# Patient Record
Sex: MALE | HISPANIC OR LATINO | Employment: STUDENT | URBAN - METROPOLITAN AREA
[De-identification: names, ages, dates, MRNs, and addresses within clinical notes are randomized per-mention and may not be internally consistent; named-entity substitution may affect disease eponyms.]

---

## 2017-09-02 ENCOUNTER — HOSPITAL ENCOUNTER (EMERGENCY)
Facility: CLINIC | Age: 32
Discharge: HOME | End: 2017-09-02
Attending: EMERGENCY MEDICINE | Admitting: EMERGENCY MEDICINE
Payer: COMMERCIAL

## 2017-09-02 VITALS
SYSTOLIC BLOOD PRESSURE: 110 MMHG | TEMPERATURE: 98.1 F | BODY MASS INDEX: 28.32 KG/M2 | RESPIRATION RATE: 16 BRPM | HEIGHT: 65 IN | WEIGHT: 170 LBS | HEART RATE: 67 BPM | DIASTOLIC BLOOD PRESSURE: 70 MMHG | OXYGEN SATURATION: 99 %

## 2017-09-02 DIAGNOSIS — R42 DIZZINESS: ICD-10-CM

## 2017-09-02 DIAGNOSIS — E86.0 DEHYDRATION: ICD-10-CM

## 2017-09-02 LAB
ANION GAP SERPL CALCULATED.3IONS-SCNC: 8 MMOL/L (ref 3–14)
BASOPHILS # BLD AUTO: 0.1 10E9/L (ref 0–0.2)
BASOPHILS NFR BLD AUTO: 0.7 %
BUN SERPL-MCNC: 19 MG/DL (ref 7–30)
CALCIUM SERPL-MCNC: 9.2 MG/DL (ref 8.5–10.1)
CHLORIDE SERPL-SCNC: 110 MMOL/L (ref 94–109)
CO2 SERPL-SCNC: 23 MMOL/L (ref 20–32)
CREAT SERPL-MCNC: 0.69 MG/DL (ref 0.66–1.25)
DIFFERENTIAL METHOD BLD: NORMAL
EOSINOPHIL # BLD AUTO: 0.2 10E9/L (ref 0–0.7)
EOSINOPHIL NFR BLD AUTO: 2.7 %
ERYTHROCYTE [DISTWIDTH] IN BLOOD BY AUTOMATED COUNT: 13 % (ref 10–15)
GFR SERPL CREATININE-BSD FRML MDRD: >90 ML/MIN/1.7M2
GLUCOSE SERPL-MCNC: 80 MG/DL (ref 70–99)
HCT VFR BLD AUTO: 47.6 % (ref 40–53)
HGB BLD-MCNC: 15 G/DL (ref 13.3–17.7)
IMM GRANULOCYTES # BLD: 0 10E9/L (ref 0–0.4)
IMM GRANULOCYTES NFR BLD: 0.1 %
LYMPHOCYTES # BLD AUTO: 2.6 10E9/L (ref 0.8–5.3)
LYMPHOCYTES NFR BLD AUTO: 33.8 %
MCH RBC QN AUTO: 28.1 PG (ref 26.5–33)
MCHC RBC AUTO-ENTMCNC: 31.5 G/DL (ref 31.5–36.5)
MCV RBC AUTO: 89 FL (ref 78–100)
MONOCYTES # BLD AUTO: 0.6 10E9/L (ref 0–1.3)
MONOCYTES NFR BLD AUTO: 7.8 %
NEUTROPHILS # BLD AUTO: 4.2 10E9/L (ref 1.6–8.3)
NEUTROPHILS NFR BLD AUTO: 54.9 %
NRBC # BLD AUTO: 0 10*3/UL
NRBC BLD AUTO-RTO: 0 /100
PLATELET # BLD AUTO: 224 10E9/L (ref 150–450)
POTASSIUM SERPL-SCNC: 4.2 MMOL/L (ref 3.4–5.3)
RBC # BLD AUTO: 5.33 10E12/L (ref 4.4–5.9)
SODIUM SERPL-SCNC: 140 MMOL/L (ref 133–144)
WBC # BLD AUTO: 7.7 10E9/L (ref 4–11)

## 2017-09-02 PROCEDURE — 93010 ELECTROCARDIOGRAM REPORT: CPT | Mod: Z6 | Performed by: EMERGENCY MEDICINE

## 2017-09-02 PROCEDURE — 93005 ELECTROCARDIOGRAM TRACING: CPT

## 2017-09-02 PROCEDURE — 96360 HYDRATION IV INFUSION INIT: CPT

## 2017-09-02 PROCEDURE — 80048 BASIC METABOLIC PNL TOTAL CA: CPT | Performed by: EMERGENCY MEDICINE

## 2017-09-02 PROCEDURE — 99284 EMERGENCY DEPT VISIT MOD MDM: CPT | Mod: 25 | Performed by: EMERGENCY MEDICINE

## 2017-09-02 PROCEDURE — 85025 COMPLETE CBC W/AUTO DIFF WBC: CPT | Performed by: EMERGENCY MEDICINE

## 2017-09-02 PROCEDURE — 99284 EMERGENCY DEPT VISIT MOD MDM: CPT | Mod: 25

## 2017-09-02 PROCEDURE — 25000131 ZZH RX MED GY IP 250 OP 636 PS 637: Performed by: EMERGENCY MEDICINE

## 2017-09-02 PROCEDURE — 25000128 H RX IP 250 OP 636: Performed by: EMERGENCY MEDICINE

## 2017-09-02 RX ORDER — METOCLOPRAMIDE 10 MG/1
10 TABLET ORAL ONCE
Status: COMPLETED | OUTPATIENT
Start: 2017-09-02 | End: 2017-09-02

## 2017-09-02 RX ORDER — MECLIZINE HYDROCHLORIDE 25 MG/1
25 TABLET ORAL ONCE
Status: COMPLETED | OUTPATIENT
Start: 2017-09-02 | End: 2017-09-02

## 2017-09-02 RX ADMIN — SODIUM CHLORIDE 1000 ML: 9 INJECTION, SOLUTION INTRAVENOUS at 20:42

## 2017-09-02 RX ADMIN — MECLIZINE 25 MG: 25 TABLET ORAL at 20:14

## 2017-09-02 RX ADMIN — METOCLOPRAMIDE HYDROCHLORIDE 10 MG: 10 TABLET ORAL at 20:14

## 2017-09-02 NOTE — ED AVS SNAPSHOT
[facility], Emergency Department    [address] [facility] [address]    Phone:  [phone]                                       Kirby Patel   MRN: [id number]    Department:  [facility], Emergency Department   Date of Visit:  9/2/2017           Patient Information     Date Of Birth          1985        Your diagnoses for this visit were:     Dizziness     Dehydration        You were seen by Jett Wesley MD.        Discharge Instructions         Dehydration (Adult)  Dehydration occurs when your body loses too much fluid. This may be the result of prolonged vomiting or diarrhea, excessive sweating, or a high fever. It may also happen if you don t drink enough fluid when you re sick or out in the heat. Misuse of diuretics (water pills) can also be a cause.  Symptoms include thirst and decreased urine output. You may also feel dizzy, weak, fatigued, or very drowsy. The diet described below is usually enough to treat dehydration. In some cases, you may need medicine.  Home care    Drink at least 12 8-ounce glasses of fluid every day to resolve the dehydration. Fluid may include water; orange juice; lemonade; apple, grape, or cranberry juice; clear fruit drinks; electrolyte replacement and sports drinks; and teas and coffee without caffeine. If you have been diagnosed with a kidney disease, ask your doctor how much and what types of fluids you should drink to prevent dehydration. If you have kidney disease, fluid can build up in the body. This can be dangerous to your health.    If you have a fever, muscle aches, or a headache as a result of a cold or flu, you may take acetaminophen or ibuprofen, unless another medicine was prescribed. If you have chronic liver or kidney disease, or have ever had a stomach ulcer or gastrointestinal bleeding, talk with your health care provider before using these medicines. Don't take aspirin if you are younger than 18 and have a fever. Aspirin raises  the chance for severe liver injury.  Follow-up care  Follow up with your health care provider, or as advised.  When to seek medical advice  Call your health care provider right away if any of these occur:    Continued vomiting    Frequent diarrhea (more than 5 times a day); blood (red or black color) or mucus in diarrhea    Blood in vomit or stool    Swollen abdomen or increasing abdominal pain    Weakness, dizziness, or fainting    Unusual drowsiness or confusion    Reduced urine output or extreme thirst    Fever of 100.4 F (34 C) or higher  Date Last Reviewed: 5/31/2015 2000-2017 [employer]. [address] [place] [address]. All rights reserved. This information is not intended as a substitute for professional medical care. Always follow your healthcare professional's instructions.          24 Hour Appointment Hotline       To make an appointment at any [facility], call [phone] ([phone]). If you don't have a family doctor or clinic, we will help you find one. [facility] clinics are conveniently located to serve the needs of you and your family.             Review of your medicines      Our records show that you are taking the medicines listed below. If these are incorrect, please call your family doctor or clinic.        Dose / Directions Last dose taken    diphenhydrAMINE 25 MG tablet   Commonly known as:  BENADRYL   Dose:  25-50 mg   Quantity:  60 tablet        Take 1-2 tablets (25-50 mg) by mouth every 6 hours as needed for itching or allergies (hives)   Refills:  1        LEXAPRO PO   Dose:  2.5 mg        Take 2.5 mg by mouth daily   Refills:  0                Procedures and tests performed during your visit     Basic metabolic panel    CBC with platelets differential    EKG 12 lead      Orders Needing Specimen Collection     None      Pending Results     Date and Time Order Name Status Description    9/2/2017 1929 EKG 12 lead Preliminary             Pending Culture  "Results     No orders found from 2017 to 9/3/2017.            Pending Results Instructions     If you had any lab results that were not finalized at the time of your Discharge, you can call the ED Lab Result RN at [phone]. You will be contacted by this team for any positive Lab results or changes in treatment. The nurses are available 7 days a week from 10A to 6:30P.  You can leave a message 24 hours per day and they will return your call.        Thank you for choosing [facility]       Thank you for choosing [facility] for your care. Our goal is always to provide you with excellent care. Hearing back from our patients is one way we can continue to improve our services. Please take a few minutes to complete the written survey that you may receive in the mail after you visit with us. Thank you!        [employer] Information     [facility] lets you send messages to your doctor, view your test results, renew your prescriptions, schedule appointments and more. To sign up, go to [email or web address] . Click on \"Log in\" on the left side of the screen, which will take you to the Welcome page. Then click on \"Sign up Now\" on the right side of the page.     You will be asked to enter the access code listed below, as well as some personal information. Please follow the directions to create your username and password.     Your access code is: [email or web address]  Expires: 2017  9:52 PM     Your access code will  in 90 days. If you need help or a new code, please call your [facility] clinic or [phone].        Care EveryWhere ID     This is your Care EveryWhere ID. This could be used by other organizations to access your [facility] medical records  [id number]        Equal Access to Services     [facility] : [address], vance lee, theresa isaacs. So [facility] [phone].    ATENCIÓN: Si habla español, tiene a niño disposición servicios gratuitos " de asistencia lingüística. Karson olson [phone].    We comply with applicable federal civil rights laws and [place] laws. We do not discriminate on the basis of race, color, national origin, age, disability sex, sexual orientation or gender identity.            After Visit Summary       This is your record. Keep this with you and show to your community pharmacist(s) and doctor(s) at your next visit.

## 2017-09-02 NOTE — ED AVS SNAPSHOT
[facility], [place], Emergency Department    [address] [place] [address]    Phone:  [phone]                                       Kirby Patel   MRN: [id number]    Department:  [facility], Emergency Department   Date of Visit:  9/2/2017           After Visit Summary Signature Page     I have received my discharge instructions, and my questions have been answered. I have discussed any challenges I see with this plan with the nurse or doctor.    ..........................................................................................................................................  Patient/Patient Representative Signature      ..........................................................................................................................................  Patient Representative Print Name and Relationship to Patient    ..................................................               ................................................  Date                                            Time    ..........................................................................................................................................  Reviewed by Signature/Title    ...................................................              ..............................................  Date                                                            Time

## 2017-09-02 NOTE — ED NOTES
Pt reports five days of HA/dizziness that improves w/ various OTC meds but returns each day. At pressent only c/o dizziness, increases w/ head movement.

## 2017-09-03 NOTE — DISCHARGE INSTRUCTIONS
Dehydration (Adult)  Dehydration occurs when your body loses too much fluid. This may be the result of prolonged vomiting or diarrhea, excessive sweating, or a high fever. It may also happen if you don t drink enough fluid when you re sick or out in the heat. Misuse of diuretics (water pills) can also be a cause.  Symptoms include thirst and decreased urine output. You may also feel dizzy, weak, fatigued, or very drowsy. The diet described below is usually enough to treat dehydration. In some cases, you may need medicine.  Home care    Drink at least 12 8-ounce glasses of fluid every day to resolve the dehydration. Fluid may include water; orange juice; lemonade; apple, grape, or cranberry juice; clear fruit drinks; electrolyte replacement and sports drinks; and teas and coffee without caffeine. If you have been diagnosed with a kidney disease, ask your doctor how much and what types of fluids you should drink to prevent dehydration. If you have kidney disease, fluid can build up in the body. This can be dangerous to your health.    If you have a fever, muscle aches, or a headache as a result of a cold or flu, you may take acetaminophen or ibuprofen, unless another medicine was prescribed. If you have chronic liver or kidney disease, or have ever had a stomach ulcer or gastrointestinal bleeding, talk with your health care provider before using these medicines. Don't take aspirin if you are younger than 18 and have a fever. Aspirin raises the chance for severe liver injury.  Follow-up care  Follow up with your health care provider, or as advised.  When to seek medical advice  Call your health care provider right away if any of these occur:    Continued vomiting    Frequent diarrhea (more than 5 times a day); blood (red or black color) or mucus in diarrhea    Blood in vomit or stool    Swollen abdomen or increasing abdominal pain    Weakness, dizziness, or fainting    Unusual drowsiness or confusion    Reduced urine  output or extreme thirst    Fever of 100.4 F (34 C) or higher  Date Last Reviewed: 5/31/2015 2000-2017 [employer]. [address] [place] [address], [place] [address]. All rights reserved. This information is not intended as a substitute for professional medical care. Always follow your healthcare professional's instructions.

## 2017-09-03 NOTE — ED PROVIDER NOTES
"  History     Chief Complaint   Patient presents with     Dizziness     Pt reports five days of HA/dizziness that improves w/ various OTC meds but returns each day. At pressent only c/o dizziness.     HPI  Kirby Patel is a 32 year old male with a history of anxiety who presents for evaluation of dizziness. Patient complains of constant dizziness with associated headache described as \"head pressure\" as well as bilateral ear pain and sinus pressure that has been ongoing for the past five days. He describes the dizziness as feeling as though the floor is unsteady/moving and generally feels disoriented to the point where he was having difficulty driving today, so took a Lyft here to the Emergency Department. His dizziness worsens with looking to the left or right. He notes he has had cold-type symptoms for the past two weeks with \"swollen lymph nodes\" and did have a migraine-type headache for the first two days he also had dizziness, but this transitioned into a diffuse headache. He denies fever. He did drink one beer last night, but otherwise denies drug or substance abuse. He did schedule an appointment on Thursday, five days from now, to see a specialist regarding his bilateral ear pain. No history of heart disease.     I have reviewed the Medications, Allergies, Past Medical and Surgical History, and Social History in the [employer] system.  Past Medical History:   Diagnosis Date     Anxiety        History reviewed. No pertinent surgical history.    No family history on file.    Social History   Substance Use Topics     Smoking status: Never Smoker     Smokeless tobacco: Not on file     Alcohol use No       No current facility-administered medications for this encounter.      Current Outpatient Prescriptions   Medication     Escitalopram Oxalate (LEXAPRO PO)     diphenhydrAMINE (BENADRYL) 25 MG tablet        Allergies   Allergen Reactions     Codeine Anaphylaxis     Hydromorphone Nausea and Vomiting     Zofran DOES " "NOT HELP, only compounds problem, PER PT.        Review of Systems  ROS: 14 point ROS neg other than the symptoms noted above in the HPI.    Physical Exam   BP: 110/78  Pulse: 67  Temp: 98.1  F (36.7  C)  Resp: 16  Height: 165.1 cm (5' 5\")  Weight: 77.1 kg (170 lb)  SpO2: 99 %  Physical Exam   Constitutional: He is oriented to person, place, and time. He appears well-developed and well-nourished. No distress.   HENT:   Head: Normocephalic and atraumatic.   Eyes: EOM are normal. Pupils are equal, round, and reactive to light. No scleral icterus.   Neck: Normal range of motion. Neck supple.   Cardiovascular: Normal rate and normal heart sounds.    Pulmonary/Chest: Effort normal. No respiratory distress.   Musculoskeletal: Normal range of motion.   Neurological: He is alert and oriented to person, place, and time. No cranial nerve deficit. Coordination normal.   Skin: Skin is warm and dry. No rash noted. He is not diaphoretic. No erythema. No pallor.       ED Course     ED Course     Procedures       7:26 PM  The patient was seen and examined by Dr. Wesley in Room 8.          EKG Interpretation:      Interpreted by Jett Wesley  Time reviewed: 1932  Symptoms at time of EKG: dizziness   Rhythm: normal sinus   Rate: normal  Axis: normal  Ectopy: none  Conduction: normal  ST Segments/ T Waves: No ST-T wave changes  Q Waves: none  Comparison to prior: No old EKG available    Clinical Impression: normal EKG            Critical Care time:  none           Labs Ordered and Resulted from Time of ED Arrival Up to the Time of Departure from the ED   BASIC METABOLIC PANEL - Abnormal; Notable for the following:        Result Value    Chloride 110 (*)     All other components within normal limits   CBC WITH PLATELETS DIFFERENTIAL            Assessments & Plan (with Medical Decision Making)   This is a 32-year-old male coming into the emergency room with complaints of 5 days of mild headache and dizziness.  Denies any previous " history of this in the past.  On physical exam is otherwise grossly unremarkable.  He can ambulate without issue.  He is able to sit up from bed without significant distress.  VITAL signs are within normal limits.  EKG shows normal sinus rhythm without signs of ectopy or ischemia.  All labs reviewed which were grossly unremarkable.  Patient was fired with a liter of normal saline as well as some Reglan and meclizine here in the emergency room.  On reassessment he is significantly improved.  He does have an appointment with ENT which I recommend that he continue following up.  Outside of that his physical exam is grossly unremarkable I believe he can be safely discharged.    I have reviewed the nursing notes.    I have reviewed the findings, diagnosis, plan and need for follow up with the patient.    Discharge Medication List as of 9/2/2017  9:52 PM          Final diagnoses:   Dizziness   Dehydration   Mercedes JUAREZ, am serving as a trained medical scribe to document services personally performed by Jett Wesley MD, based on the provider's statements to me.   Jett JUAREZ MD, was physically present and have reviewed and verified the accuracy of this note documented by Mercedes Vu.      9/2/2017   [facility], [place], EMERGENCY DEPARTMENT     Jett Wesley MD  09/02/17 4904

## 2017-09-04 LAB — INTERPRETATION ECG - MUSE: NORMAL

## 2018-04-28 ENCOUNTER — HOSPITAL ENCOUNTER (EMERGENCY)
Facility: CLINIC | Age: 33
Discharge: HOME | End: 2018-04-28
Attending: EMERGENCY MEDICINE | Admitting: EMERGENCY MEDICINE
Payer: COMMERCIAL

## 2018-04-28 ENCOUNTER — APPOINTMENT (OUTPATIENT)
Dept: GENERAL RADIOLOGY | Facility: CLINIC | Age: 33
End: 2018-04-28
Attending: EMERGENCY MEDICINE
Payer: COMMERCIAL

## 2018-04-28 VITALS
TEMPERATURE: 97.5 F | DIASTOLIC BLOOD PRESSURE: 83 MMHG | HEIGHT: 64 IN | OXYGEN SATURATION: 99 % | RESPIRATION RATE: 20 BRPM | SYSTOLIC BLOOD PRESSURE: 119 MMHG | HEART RATE: 69 BPM

## 2018-04-28 DIAGNOSIS — S60.011A CONTUSION OF RIGHT THUMB WITHOUT DAMAGE TO NAIL, INITIAL ENCOUNTER: ICD-10-CM

## 2018-04-28 DIAGNOSIS — S60.10XA SUBUNGUAL HEMATOMA OF FINGERNAIL, INITIAL ENCOUNTER: ICD-10-CM

## 2018-04-28 PROCEDURE — 99283 EMERGENCY DEPT VISIT LOW MDM: CPT | Performed by: EMERGENCY MEDICINE

## 2018-04-28 PROCEDURE — 25000132 ZZH RX MED GY IP 250 OP 250 PS 637: Performed by: EMERGENCY MEDICINE

## 2018-04-28 PROCEDURE — 73140 X-RAY EXAM OF FINGER(S): CPT | Mod: RT

## 2018-04-28 PROCEDURE — 99283 EMERGENCY DEPT VISIT LOW MDM: CPT | Mod: Z6 | Performed by: EMERGENCY MEDICINE

## 2018-04-28 RX ORDER — ACETAMINOPHEN 500 MG
1000 TABLET ORAL ONCE
Status: COMPLETED | OUTPATIENT
Start: 2018-04-28 | End: 2018-04-28

## 2018-04-28 RX ORDER — IBUPROFEN 800 MG/1
800 TABLET, FILM COATED ORAL ONCE
Status: COMPLETED | OUTPATIENT
Start: 2018-04-28 | End: 2018-04-28

## 2018-04-28 RX ADMIN — ACETAMINOPHEN 1000 MG: 500 TABLET, FILM COATED ORAL at 06:55

## 2018-04-28 RX ADMIN — IBUPROFEN 800 MG: 800 TABLET ORAL at 06:55

## 2018-04-28 NOTE — ED TRIAGE NOTES
"Pt arrives with complaints of right thumb pain/swelling after smashing his thumb in a door at 0000. Pain has been increasing and pt notes decreased ROM, noting that it feels \"really stiff\". CMS intact. Pt states he took six regular strength advil since 0000.  "

## 2018-04-28 NOTE — ED AVS SNAPSHOT
[facility], Emergency Department    [address] [address]    Phone:  [phone]                                       Kirby Patel   MRN: [id number]    Department:  [facility], Emergency Department   Date of Visit:  4/28/2018           Patient Information     Date Of Birth          1985        Your diagnoses for this visit were:     Contusion of right thumb without damage to nail, initial encounter     Subungual hematoma of fingernail, initial encounter        You were seen by Tyrsee Freire MD.        Discharge Instructions       Please make an appointment to follow up with Your Primary Care Provider in 7 days even if entirely better.  You can call to discuss the appropriate follow up timing with your doctor.     Return to the emergency department for any worsening redness, swelling, drainage, pain, fever or any other concerns as given or discussed.     You can take tylenol as needed for pain. The maximum daily dose is 4000 mg and the maximum single dose at a time is 1000mg. For your condition, your should take 1000mg every 6 hours as needed for pain.    You can take ibuprofen for pain. The maximum daily dose is 2400 mg and the maximum single dose as a time is 800mg. For your condition, your should take 600mg every 4 hours as needed for pain.        24 Hour Appointment Hotline       To make an appointment at any [facility] clinic, call [phone] ([phone]). If you don't have a family doctor or clinic, we will help you find one. [facility] clinics are conveniently located to serve the needs of you and your family.             Review of your medicines      Our records show that you are taking the medicines listed below. If these are incorrect, please call your family doctor or clinic.        Dose / Directions Last dose taken    diphenhydrAMINE 25 MG tablet   Commonly known as:  BENADRYL   Dose:  25-50 mg   Quantity:  60 tablet        Take 1-2 tablets (25-50 mg) by  "mouth every 6 hours as needed for itching or allergies (hives)   Refills:  1        LEXAPRO PO   Dose:  2.5 mg        Take 2.5 mg by mouth daily   Refills:  0                Procedures and tests performed during your visit     XR Finger Right G/E 2 Views      Orders Needing Specimen Collection     None      Pending Results     Date and Time Order Name Status Description    2018 0554 XR Finger Right G/E 2 Views In process             Pending Culture Results     No orders found from 2018 to 2018.            Pending Results Instructions     If you had any lab results that were not finalized at the time of your Discharge, you can call the ED Lab Result RN at [phone]. You will be contacted by this team for any positive Lab results or changes in treatment. The nurses are available 7 days a week from 10A to 6:30P.  You can leave a message 24 hours per day and they will return your call.        Thank you for choosing [facility]       Thank you for choosing [facility] for your care. Our goal is always to provide you with excellent care. Hearing back from our patients is one way we can continue to improve our services. Please take a few minutes to complete the written survey that you may receive in the mail after you visit with us. Thank you!        [employer] Information     [facility] lets you send messages to your doctor, view your test results, renew your prescriptions, schedule appointments and more. To sign up, go to [email or web address] . Click on \"Log in\" on the left side of the screen, which will take you to the Welcome page. Then click on \"Sign up Now\" on the right side of the page.     You will be asked to enter the access code listed below, as well as some personal information. Please follow the directions to create your username and password.     Your access code is: [email or web address]  Expires: 2018  6:47 AM     Your access code will  in 90 days. If you need help or a new code, please call your " [facility] or [phone].        Care EveryWhere ID     This is your Care EveryWhere ID. This could be used by other organizations to access your [facility] medical records  [id number]        Equal Access to Services     MOIRA REYNA : [address], wasunnyda luqadaha, qaybta kaalmada judah, theresa heath. So [facility] [phone].    ATENCIÓN: Si habla español, tiene a niño disposición servicios gratuitos de asistencia lingüística. Llame al [phone].    We comply with applicable federal civil rights laws and [place] laws. We do not discriminate on the basis of race, color, national origin, age, disability, sex, sexual orientation, or gender identity.            After Visit Summary       This is your record. Keep this with you and show to your community pharmacist(s) and doctor(s) at your next visit.

## 2018-04-28 NOTE — DISCHARGE INSTRUCTIONS
Please make an appointment to follow up with Your Primary Care Provider in 7 days even if entirely better.  You can call to discuss the appropriate follow up timing with your doctor.     Return to the emergency department for any worsening redness, swelling, drainage, pain, fever or any other concerns as given or discussed.     You can take tylenol as needed for pain. The maximum daily dose is 4000 mg and the maximum single dose at a time is 1000mg. For your condition, your should take 1000mg every 6 hours as needed for pain.    You can take ibuprofen for pain. The maximum daily dose is 2400 mg and the maximum single dose as a time is 800mg. For your condition, your should take 600mg every 4 hours as needed for pain.

## 2018-04-28 NOTE — ED PROVIDER NOTES
"  History     Chief Complaint   Patient presents with     Hand Injury     HPI  Kirby Patel is a 32 year old male presenting with right thumb pain.  Smashes distal thumb phalanx in a car door earlier today since then with increasing pain now radiating into his wrist and shoulder.  No direct trauma to any location except for the distal thumb.  There has been some bruising under the nail, and swelling of the distal thumb but otherwise no evidence of injury.     Completely well prior to incident    I have reviewed the Medications, Allergies, Past Medical and Surgical History, and Social History in the Epic system.    Review of Systems   8 point review of symptoms was performed and is negative except as noted above.     Physical Exam   BP: 119/83  Pulse: 69  Temp: 97.5  F (36.4  C)  Resp: 20  Height: 162.6 cm (5' 4\")  SpO2: 97 %      Physical Exam  GEN: Well appearing, non toxic, cooperative and conversant.   HEENT: The head is normocephalic and atraumatic. Pupils are equal round and reactive to light. Extraocular motions are intact. There is no facial swelling.   CV: Regular rate   PULM: Unlabored breathing     EXT: Full range of motion.  No edema.  Right distal thumb with no gross deformity.  No nail fracture.  There is a subungual hematoma approximately 30% of the nailbed, no evidence of avulsion otherwise.  EHL, FHL, TA 5 out of 5 flexion extension across the PIP joint is completely intact.  No tenderness about the wrist forearm elbow or any other part of the extremity.  NEURO: Cranial nerves II through XII are intact and symmetric. Bilateral upper and lower extremities grossly show full range of motion without any focal deficits.     PSYCH: Calm and cooperative, interactive.     ED Course     ED Course     Procedures        Results for orders placed or performed during the hospital encounter of 04/28/18   XR Finger Right G/E 2 Views    Narrative    Exam: XR FINGER RT G/E 2 VW, 4/28/2018 6:42 AM    Indication: " distal thumb pain after smashed in car door.;     Comparison: None    Findings:   No acute fracture or dislocation.      Impression    Impression: No fracture.            Labs Ordered and Resulted from Time of ED Arrival Up to the Time of Departure from the ED - No data to display         Assessments & Plan (with Medical Decision Making)   Subungual hematoma following injury as described  X-ray without evidence of acute fracture  There is normal anatomic position of the thumb and hand.    Given only small sliver of hematoma, indication for trephination or nail removal for nailbed repairs.  -NSAIDs and Tylenol as needed for pain  -PCP follow-up 1 week  -Careful precautions given and discussed    - Patient agrees to our plan and is ready and eager for discharge. Care plan, follow up plan, and reasons to return immediately to the ED were dicussed in detail and summarized as noted in the discharge instructions.          I have reviewed the nursing notes.    I have reviewed the findings, diagnosis, plan and need for follow up with the patient.    New Prescriptions    No medications on file       Final diagnoses:   Contusion of right thumb without damage to nail, initial encounter   Subungual hematoma of fingernail, initial encounter       4/28/2018   [facility], [place], EMERGENCY DEPARTMENT     Tyrese Freire MD  04/28/18 0700

## 2018-11-27 ENCOUNTER — HOSPITAL ENCOUNTER (EMERGENCY)
Facility: CLINIC | Age: 33
Discharge: HOME | End: 2018-11-28
Attending: EMERGENCY MEDICINE | Admitting: EMERGENCY MEDICINE
Payer: COMMERCIAL

## 2018-11-27 DIAGNOSIS — R11.0 NAUSEA: ICD-10-CM

## 2018-11-27 DIAGNOSIS — R10.13 ABDOMINAL PAIN, EPIGASTRIC: ICD-10-CM

## 2018-11-27 PROCEDURE — 99284 EMERGENCY DEPT VISIT MOD MDM: CPT | Mod: Z6 | Performed by: EMERGENCY MEDICINE

## 2018-11-27 PROCEDURE — 96361 HYDRATE IV INFUSION ADD-ON: CPT | Mod: 59

## 2018-11-27 PROCEDURE — 83690 ASSAY OF LIPASE: CPT | Performed by: EMERGENCY MEDICINE

## 2018-11-27 PROCEDURE — 80053 COMPREHEN METABOLIC PANEL: CPT | Performed by: EMERGENCY MEDICINE

## 2018-11-27 PROCEDURE — 99284 EMERGENCY DEPT VISIT MOD MDM: CPT | Mod: 25

## 2018-11-27 PROCEDURE — 85025 COMPLETE CBC W/AUTO DIFF WBC: CPT | Performed by: EMERGENCY MEDICINE

## 2018-11-27 PROCEDURE — 96374 THER/PROPH/DIAG INJ IV PUSH: CPT

## 2018-11-27 RX ORDER — ONDANSETRON 2 MG/ML
4 INJECTION INTRAMUSCULAR; INTRAVENOUS EVERY 30 MIN PRN
Status: DISCONTINUED | OUTPATIENT
Start: 2018-11-27 | End: 2018-11-28 | Stop reason: HOSPADM

## 2018-11-27 RX ORDER — SODIUM CHLORIDE 9 MG/ML
1000 INJECTION, SOLUTION INTRAVENOUS CONTINUOUS
Status: DISCONTINUED | OUTPATIENT
Start: 2018-11-27 | End: 2018-11-28 | Stop reason: HOSPADM

## 2018-11-27 NOTE — ED AVS SNAPSHOT
[facility], Emergency Department    [address] [address]    Phone:  [phone]                                       Kirby Patel   MRN: [id number]    Department:  [facility], Emergency Department   Date of Visit:  11/27/2018           Patient Information     Date Of Birth          1985        Your diagnoses for this visit were:     Abdominal pain, epigastric     Nausea        You were seen by Leeroy Angela DO.        Discharge Instructions         Follow-up with your primary care provider.  Return emergency department for any new or worsening symptoms as needed.    Abdominal Pain, Unknown Cause [Male]  Based on your visit today, the exact cause of your abdominal (stomach) pain is not clear. Your exam and tests do not indicate a dangerous cause at this time. However, the signs of a serious problem may take more time to appear. Although your exam was reassuring today, sometimes early in the course of many conditions, exam and lab tests can appear normal. Therefore, it is important for you to watch for any new symptoms or worsening of your condition.  Causes:  It may not be obvious what caused your symptoms. Pay attention to things that do seem to make your symptoms worse or better and discuss this with your doctor when you follow up.  Diagnosis:  The evaluation of abdominal pain in the emergency department may only require an exam by the doctor or it may include blood, urine or imaging studies, depending on many factors. Sometimes exams and tests can identify a cause but in many cases, a clear cause is not found. Further testing at follow up visits may help to suggest a clear diagnosis.  Home Care:    Rest as much as possible until your next exam.    Try to avoid any medications (unless otherwise directed by your doctor), foods, activities, or other factors that you may have contributed to your symptoms.    Try to eat foods that you know that you have tolerated well  in the past. Certain diets may be recommended for some conditions that cause abdominal pain. However, since the cause of your symptoms may not be clear, discuss your diet more with your primary care provider or specialist for further recommendations.     Eating several small meals per day as opposed to 2 or 3 larger meals may help.    Monitor closely for anything that may make your symptoms worse or better. Pay close attention to symptoms below that may indicate worsening of your condition.  Follow Up And Precautions:  See your doctor or this facility as instructed (or sooner, if your symptoms are not improving). In some cases, you may need more testing.  Contact Your Doctor Or Seek Medical Attention  if any of the following occur:    Pain is becoming worse    You are unable to take your medications because of too much vomiting    Swelling of the abdomen    Fever of 101 F (38.3 C) or higher, or as directed by your health care provider    Blood in vomit or bowel movements (dark red or black color)    Jaundice (yellow color of eyes and skin)    New onset of weakness, dizziness or fainting    New onset of chest, arm, back, neck or jaw pain    7888-4800 [employer]. [address] [place] [address]. All rights reserved. This information is not intended as a substitute for professional medical care. Always follow your healthcare professional's instructions.  This information has been modified by your health care provider with permission from the publisher.  Modifications clinically reviewed by Dr. Lazaro Domínguez on 7/20/18.          24 Hour Appointment Hotline       To make an appointment at any [facility], call [phone] ([phone]). If you don't have a family doctor or clinic, we will help you find one. [facility] are conveniently located to serve the needs of you and your family.             Review of your medicines      START taking        Dose / Directions Last dose taken     ondansetron 4 MG ODT tab   Commonly known as:  ZOFRAN ODT   Dose:  4 mg   Quantity:  20 tablet        Take 1 tablet (4 mg) by mouth every 8 hours as needed for nausea   Refills:  1          Our records show that you are taking the medicines listed below. If these are incorrect, please call your family doctor or clinic.        Dose / Directions Last dose taken    diphenhydrAMINE 25 MG tablet   Commonly known as:  BENADRYL   Dose:  25-50 mg   Quantity:  60 tablet        Take 1-2 tablets (25-50 mg) by mouth every 6 hours as needed for itching or allergies (hives)   Refills:  1        LEXAPRO PO   Dose:  2.5 mg        Take 2.5 mg by mouth daily   Refills:  0                Prescriptions were sent or printed at these locations (1 Prescription)                   Other Prescriptions                Printed at Department/Unit printer (1 of 1)         ondansetron (ZOFRAN ODT) 4 MG ODT tab                Procedures and tests performed during your visit     CBC with platelets differential    Comprehensive metabolic panel    Lipase    Peripheral IV catheter    UA reflex to Microscopic and Culture      Orders Needing Specimen Collection     None      Pending Results     Date and Time Order Name Status Description    11/27/2018 2358 UA reflex to Microscopic and Culture In process             Pending Culture Results     Date and Time Order Name Status Description    11/27/2018 2358 UA reflex to Microscopic and Culture In process             Pending Results Instructions     If you had any lab results that were not finalized at the time of your Discharge, you can call the ED Lab Result RN at [phone]. You will be contacted by this team for any positive Lab results or changes in treatment. The nurses are available 7 days a week from 10A to 6:30P.  You can leave a message 24 hours per day and they will return your call.        Thank you for choosing [facility]       Thank you for choosing [facility] for your care. Our goal is always to  "provide you with excellent care. Hearing back from our patients is one way we can continue to improve our services. Please take a few minutes to complete the written survey that you may receive in the mail after you visit with us. Thank you!        [employer] Information     [employer] lets you send messages to your doctor, view your test results, renew your prescriptions, schedule appointments and more. To sign up, go to [email or web address] . Click on \"Log in\" on the left side of the screen, which will take you to the Welcome page. Then click on \"Sign up Now\" on the right side of the page.     You will be asked to enter the access code listed below, as well as some personal information. Please follow the directions to create your username and password.     Your access code is: [email or web address]  Expires: 2019  1:48 AM     Your access code will  in 90 days. If you need help or a new code, please call your [facility] clinic or [phone].        Care EveryWhere ID     This is your Care EveryWhere ID. This could be used by other organizations to access your [facility] medical records  [id number]        Equal Access to Services     MOIRA REYNA AH: [address], [place] lee, [address] so, [facility]. So [facility] [phone].    ATENCIÓN: Si habla español, tiene a niño disposición servicios gratuitos de asistencia lingüística. Karson al [phone].    We comply with applicable federal civil rights laws and [place] laws. We do not discriminate on the basis of race, color, national origin, age, disability, sex, sexual orientation, or gender identity.            After Visit Summary       This is your record. Keep this with you and show to your community pharmacist(s) and doctor(s) at your next visit.                  "

## 2018-11-27 NOTE — ED AVS SNAPSHOT
[facility], [place], Emergency Department    [address] [place] [address]    Phone:  [phone]                                       Kirby Patel   MRN: [id number]    Department:  [facility], Emergency Department   Date of Visit:  11/27/2018           After Visit Summary Signature Page     I have received my discharge instructions, and my questions have been answered. I have discussed any challenges I see with this plan with the nurse or doctor.    ..........................................................................................................................................  Patient/Patient Representative Signature      ..........................................................................................................................................  Patient Representative Print Name and Relationship to Patient    ..................................................               ................................................  Date                                   Time    ..........................................................................................................................................  Reviewed by Signature/Title    ...................................................              ..............................................  Date                                               Time          22EPIC Rev 08/18

## 2018-11-28 VITALS
TEMPERATURE: 97.7 F | SYSTOLIC BLOOD PRESSURE: 112 MMHG | HEART RATE: 64 BPM | WEIGHT: 185 LBS | RESPIRATION RATE: 16 BRPM | HEIGHT: 65 IN | OXYGEN SATURATION: 100 % | BODY MASS INDEX: 30.82 KG/M2 | DIASTOLIC BLOOD PRESSURE: 71 MMHG

## 2018-11-28 LAB
ALBUMIN SERPL-MCNC: 4.2 G/DL (ref 3.4–5)
ALBUMIN UR-MCNC: NEGATIVE MG/DL
ALP SERPL-CCNC: 128 U/L (ref 40–150)
ALT SERPL W P-5'-P-CCNC: 78 U/L (ref 0–70)
ANION GAP SERPL CALCULATED.3IONS-SCNC: 7 MMOL/L (ref 3–14)
APPEARANCE UR: CLEAR
AST SERPL W P-5'-P-CCNC: 33 U/L (ref 0–45)
BASOPHILS # BLD AUTO: 0 10E9/L (ref 0–0.2)
BASOPHILS NFR BLD AUTO: 0.4 %
BILIRUB SERPL-MCNC: 0.2 MG/DL (ref 0.2–1.3)
BILIRUB UR QL STRIP: NEGATIVE
BUN SERPL-MCNC: 18 MG/DL (ref 7–30)
CALCIUM SERPL-MCNC: 9.1 MG/DL (ref 8.5–10.1)
CHLORIDE SERPL-SCNC: 108 MMOL/L (ref 94–109)
CO2 SERPL-SCNC: 25 MMOL/L (ref 20–32)
COLOR UR AUTO: NORMAL
CREAT SERPL-MCNC: 0.76 MG/DL (ref 0.66–1.25)
DIFFERENTIAL METHOD BLD: NORMAL
EOSINOPHIL # BLD AUTO: 0.1 10E9/L (ref 0–0.7)
EOSINOPHIL NFR BLD AUTO: 1.4 %
ERYTHROCYTE [DISTWIDTH] IN BLOOD BY AUTOMATED COUNT: 12.7 % (ref 10–15)
GFR SERPL CREATININE-BSD FRML MDRD: >90 ML/MIN/1.7M2
GLUCOSE SERPL-MCNC: 120 MG/DL (ref 70–99)
GLUCOSE UR STRIP-MCNC: NEGATIVE MG/DL
HCT VFR BLD AUTO: 42.6 % (ref 40–53)
HGB BLD-MCNC: 13.6 G/DL (ref 13.3–17.7)
HGB UR QL STRIP: NEGATIVE
IMM GRANULOCYTES # BLD: 0 10E9/L (ref 0–0.4)
IMM GRANULOCYTES NFR BLD: 0.1 %
KETONES UR STRIP-MCNC: NEGATIVE MG/DL
LEUKOCYTE ESTERASE UR QL STRIP: NEGATIVE
LIPASE SERPL-CCNC: 69 U/L (ref 73–393)
LYMPHOCYTES # BLD AUTO: 2.3 10E9/L (ref 0.8–5.3)
LYMPHOCYTES NFR BLD AUTO: 29.3 %
MCH RBC QN AUTO: 28.3 PG (ref 26.5–33)
MCHC RBC AUTO-ENTMCNC: 31.9 G/DL (ref 31.5–36.5)
MCV RBC AUTO: 89 FL (ref 78–100)
MONOCYTES # BLD AUTO: 0.5 10E9/L (ref 0–1.3)
MONOCYTES NFR BLD AUTO: 6 %
NEUTROPHILS # BLD AUTO: 4.9 10E9/L (ref 1.6–8.3)
NEUTROPHILS NFR BLD AUTO: 62.8 %
NITRATE UR QL: NEGATIVE
NRBC # BLD AUTO: 0 10*3/UL
NRBC BLD AUTO-RTO: 0 /100
PH UR STRIP: 5.5 PH (ref 5–7)
PLATELET # BLD AUTO: 258 10E9/L (ref 150–450)
PLATELET # BLD EST: NORMAL 10*3/UL
POTASSIUM SERPL-SCNC: 3.8 MMOL/L (ref 3.4–5.3)
PROT SERPL-MCNC: 7.9 G/DL (ref 6.8–8.8)
RBC # BLD AUTO: 4.81 10E12/L (ref 4.4–5.9)
RBC MORPH BLD: NORMAL
SODIUM SERPL-SCNC: 139 MMOL/L (ref 133–144)
SOURCE: NORMAL
SP GR UR STRIP: 1.01 (ref 1–1.03)
UROBILINOGEN UR STRIP-MCNC: NORMAL MG/DL (ref 0–2)
WBC # BLD AUTO: 7.8 10E9/L (ref 4–11)

## 2018-11-28 PROCEDURE — 25000132 ZZH RX MED GY IP 250 OP 250 PS 637: Performed by: EMERGENCY MEDICINE

## 2018-11-28 PROCEDURE — 81003 URINALYSIS AUTO W/O SCOPE: CPT | Performed by: EMERGENCY MEDICINE

## 2018-11-28 PROCEDURE — 25000128 H RX IP 250 OP 636: Performed by: EMERGENCY MEDICINE

## 2018-11-28 PROCEDURE — 96374 THER/PROPH/DIAG INJ IV PUSH: CPT

## 2018-11-28 PROCEDURE — 96361 HYDRATE IV INFUSION ADD-ON: CPT | Mod: 59

## 2018-11-28 PROCEDURE — 25000125 ZZHC RX 250: Performed by: EMERGENCY MEDICINE

## 2018-11-28 RX ORDER — ONDANSETRON 4 MG/1
4 TABLET, ORALLY DISINTEGRATING ORAL EVERY 8 HOURS PRN
Qty: 20 TABLET | Refills: 1 | Status: SHIPPED | OUTPATIENT
Start: 2018-11-28 | End: 2018-12-01

## 2018-11-28 RX ADMIN — SODIUM CHLORIDE 1000 ML: 9 INJECTION, SOLUTION INTRAVENOUS at 00:06

## 2018-11-28 RX ADMIN — ONDANSETRON 4 MG: 2 INJECTION INTRAMUSCULAR; INTRAVENOUS at 00:07

## 2018-11-28 RX ADMIN — LIDOCAINE HYDROCHLORIDE 30 ML: 20 SOLUTION ORAL; TOPICAL at 00:27

## 2018-11-28 ASSESSMENT — ENCOUNTER SYMPTOMS
SHORTNESS OF BREATH: 0
ARTHRALGIAS: 0
NECK STIFFNESS: 0
DIFFICULTY URINATING: 0
HEADACHES: 0
VOMITING: 1
COLOR CHANGE: 0
NAUSEA: 1
CONFUSION: 0
FEVER: 0
EYE REDNESS: 0
ABDOMINAL PAIN: 1

## 2018-11-28 NOTE — ED PROVIDER NOTES
"  History     Chief Complaint   Patient presents with     Abdominal Pain     HPI  Kirby Patel is a 33 year old male who presents with a chief complaint of abdominal pain.  Proximal been 2 hours prior to arrival patient started to have severe upper abdominal pain associate with nausea.  No fevers or chills.  No diarrhea or constipation.  No previous history of kidney stones or back or flank pain.  Patient has not had any previous abdominal surgeries.  He denies any drug or alcohol use tonight.    I have reviewed the Medications, Allergies, Past Medical and Surgical History, and Social History in the [employer] system.  Past Medical History:   Diagnosis Date     Anxiety        No past surgical history on file.    No family history on file.    Social History   Substance Use Topics     Smoking status: Never Smoker     Smokeless tobacco: Never Used     Alcohol use No       Review of Systems   Constitutional: Negative for fever.   HENT: Negative for congestion.    Eyes: Negative for redness.   Respiratory: Negative for shortness of breath.    Cardiovascular: Negative for chest pain.   Gastrointestinal: Positive for abdominal pain, nausea and vomiting.   Genitourinary: Negative for difficulty urinating.   Musculoskeletal: Negative for arthralgias and neck stiffness.   Skin: Negative for color change.   Neurological: Negative for headaches.   Psychiatric/Behavioral: Negative for confusion.       Physical Exam   BP: 112/67  Pulse: 64  Temp: 97.7  F (36.5  C)  Resp: 16  Height: 165.1 cm (5' 5\")  Weight: 83.9 kg (185 lb)  SpO2: 99 %      Physical Exam   Constitutional: No distress.   HENT:   Head: Atraumatic.   Mouth/Throat: Oropharynx is clear and moist. No oropharyngeal exudate.   Eyes: Pupils are equal, round, and reactive to light. No scleral icterus.   Cardiovascular: Normal heart sounds.    Pulmonary/Chest: Breath sounds normal. No respiratory distress.   Abdominal: Soft. He exhibits no distension.   Epigastric " tenderness   Musculoskeletal: He exhibits no edema or tenderness.   Neurological: He is alert.   Skin: Skin is warm. He is not diaphoretic.   Psychiatric: He has a normal mood and affect. His behavior is normal.       ED Course     ED Course     Procedures               Labs Ordered and Resulted from Time of ED Arrival Up to the Time of Departure from the ED   COMPREHENSIVE METABOLIC PANEL - Abnormal; Notable for the following:        Result Value    Glucose 120 (*)     ALT 78 (*)     All other components within normal limits   LIPASE - Abnormal; Notable for the following:     Lipase 69 (*)     All other components within normal limits   CBC WITH PLATELETS DIFFERENTIAL   UA MACROSCOPIC WITH REFLEX TO MICRO AND CULTURE   PERIPHERAL IV CATHETER     Results for orders placed or performed during the hospital encounter of 11/27/18   CBC with platelets differential   Result Value Ref Range    WBC 7.8 4.0 - 11.0 10e9/L    RBC Count 4.81 4.4 - 5.9 10e12/L    Hemoglobin 13.6 13.3 - 17.7 g/dL    Hematocrit 42.6 40.0 - 53.0 %    MCV 89 78 - 100 fl    MCH 28.3 26.5 - 33.0 pg    MCHC 31.9 31.5 - 36.5 g/dL    RDW 12.7 10.0 - 15.0 %    Platelet Count 258 150 - 450 10e9/L    Diff Method Automated Method     % Neutrophils 62.8 %    % Lymphocytes 29.3 %    % Monocytes 6.0 %    % Eosinophils 1.4 %    % Basophils 0.4 %    % Immature Granulocytes 0.1 %    Nucleated RBCs 0 0 /100    Absolute Neutrophil 4.9 1.6 - 8.3 10e9/L    Absolute Lymphocytes 2.3 0.8 - 5.3 10e9/L    Absolute Monocytes 0.5 0.0 - 1.3 10e9/L    Absolute Eosinophils 0.1 0.0 - 0.7 10e9/L    Absolute Basophils 0.0 0.0 - 0.2 10e9/L    Abs Immature Granulocytes 0.0 0 - 0.4 10e9/L    Absolute Nucleated RBC 0.0     RBC Morphology Normal     Platelet Estimate Confirming automated cell count    Comprehensive metabolic panel   Result Value Ref Range    Sodium 139 133 - 144 mmol/L    Potassium 3.8 3.4 - 5.3 mmol/L    Chloride 108 94 - 109 mmol/L    Carbon Dioxide 25 20 - 32 mmol/L     Anion Gap 7 3 - 14 mmol/L    Glucose 120 (H) 70 - 99 mg/dL    Urea Nitrogen 18 7 - 30 mg/dL    Creatinine 0.76 0.66 - 1.25 mg/dL    GFR Estimate >90 >60 mL/min/1.7m2    GFR Estimate If Black >90 >60 mL/min/1.7m2    Calcium 9.1 8.5 - 10.1 mg/dL    Bilirubin Total 0.2 0.2 - 1.3 mg/dL    Albumin 4.2 3.4 - 5.0 g/dL    Protein Total 7.9 6.8 - 8.8 g/dL    Alkaline Phosphatase 128 40 - 150 U/L    ALT 78 (H) 0 - 70 U/L    AST 33 0 - 45 U/L   Lipase   Result Value Ref Range    Lipase 69 (L) 73 - 393 U/L            Assessments & Plan (with Medical Decision Making)   33-year-old male presents with chief complaint of nausea vomiting and abdominal pain.  CMP and CBC are unremarkable.  Patient's lipase is not elevated.  Symptoms were controlled with a GI cocktail as well as Zofran.  Patient's vital signs are unremarkable as well.  He does not have associated tenderness over his gallbladder or appendix I do not have concern for a surgical emergency at this time.  Suspect his pain may be secondary to a viral illness.  At this point we will discharge him home with Zofran and have him follow-up with his primary care doctor.  Patient is comfortable with the plan and discharge.    I have reviewed the nursing notes.    I have reviewed the findings, diagnosis, plan and need for follow up with the patient.    New Prescriptions    ONDANSETRON (ZOFRAN ODT) 4 MG ODT TAB    Take 1 tablet (4 mg) by mouth every 8 hours as needed for nausea       Final diagnoses:   Abdominal pain, epigastric   Nausea       11/27/2018   [facility], [place], EMERGENCY DEPARTMENT     Leeroy Angela, DO  11/28/18 0136

## 2018-11-28 NOTE — DISCHARGE INSTRUCTIONS
Follow-up with your primary care provider.  Return emergency department for any new or worsening symptoms as needed.    Abdominal Pain, Unknown Cause [Male]  Based on your visit today, the exact cause of your abdominal (stomach) pain is not clear. Your exam and tests do not indicate a dangerous cause at this time. However, the signs of a serious problem may take more time to appear. Although your exam was reassuring today, sometimes early in the course of many conditions, exam and lab tests can appear normal. Therefore, it is important for you to watch for any new symptoms or worsening of your condition.  Causes:  It may not be obvious what caused your symptoms. Pay attention to things that do seem to make your symptoms worse or better and discuss this with your doctor when you follow up.  Diagnosis:  The evaluation of abdominal pain in the emergency department may only require an exam by the doctor or it may include blood, urine or imaging studies, depending on many factors. Sometimes exams and tests can identify a cause but in many cases, a clear cause is not found. Further testing at follow up visits may help to suggest a clear diagnosis.  Home Care:    Rest as much as possible until your next exam.    Try to avoid any medications (unless otherwise directed by your doctor), foods, activities, or other factors that you may have contributed to your symptoms.    Try to eat foods that you know that you have tolerated well in the past. Certain diets may be recommended for some conditions that cause abdominal pain. However, since the cause of your symptoms may not be clear, discuss your diet more with your primary care provider or specialist for further recommendations.     Eating several small meals per day as opposed to 2 or 3 larger meals may help.    Monitor closely for anything that may make your symptoms worse or better. Pay close attention to symptoms below that may indicate worsening of your  condition.  Follow Up And Precautions:  See your doctor or this facility as instructed (or sooner, if your symptoms are not improving). In some cases, you may need more testing.  Contact Your Doctor Or Seek Medical Attention  if any of the following occur:    Pain is becoming worse    You are unable to take your medications because of too much vomiting    Swelling of the abdomen    Fever of 101 F (38.3 C) or higher, or as directed by your health care provider    Blood in vomit or bowel movements (dark red or black color)    Jaundice (yellow color of eyes and skin)    New onset of weakness, dizziness or fainting    New onset of chest, arm, back, neck or jaw pain    2761-9216 [employer]. [address] [place] [address], [place] [address]. All rights reserved. This information is not intended as a substitute for professional medical care. Always follow your healthcare professional's instructions.  This information has been modified by your health care provider with permission from the publisher.  Modifications clinically reviewed by Dr. Lazaro Domínguez on 7/20/18.

## 2018-11-28 NOTE — ED TRIAGE NOTES
Nausea and upper abdominal pain that started about 2 hours ago. Patient reports emesis at home. Headache started this morning, but is now improved.

## 2019-03-06 ENCOUNTER — MEDICAL CORRESPONDENCE (OUTPATIENT)
Dept: HEALTH INFORMATION MANAGEMENT | Facility: CLINIC | Age: 34
End: 2019-03-06

## 2019-03-06 ENCOUNTER — TRANSFERRED RECORDS (OUTPATIENT)
Dept: HEALTH INFORMATION MANAGEMENT | Facility: CLINIC | Age: 34
End: 2019-03-06

## 2019-03-20 ENCOUNTER — TELEPHONE (OUTPATIENT)
Dept: GASTROENTEROLOGY | Facility: CLINIC | Age: 34
End: 2019-03-20

## 2019-03-20 NOTE — TELEPHONE ENCOUNTER
Spoke to patient and confirmed appointment scheduled on 3/27/19 at 1020 with [facility] GI clinic. Patient to arrive 15 min early.    TAVO Trammell

## 2019-03-21 NOTE — TELEPHONE ENCOUNTER
FUTURE VISIT INFORMATION      FUTURE VISIT INFORMATION:    Date: 3/27/19    Time: 10:20am    Location: [facility]    REFERRAL INFORMATION:    Referring provider: Dr. Branden Melendrez    Referring providers clinic:  [facility]    Reason for visit/diagnosis: GERD      NOTES STATUS DETAILS   OFFICE NOTE from referring provider Received 3/12/19 Media   OFFICE NOTE from other specialist N/A    DISCHARGE SUMMARY from hospital Internal 11/27/18   OPERATIVE REPORT N/A    MEDICATION LIST Internal         ENDOSCOPY  N/A    COLONOSCOPY N/A    ERCP N/A    EUS N/A    STOOL TESTING N/A    PERTINENT LABS N/A    PATHOLOGY REPORTS (RELATED) N/A    IMAGING (CT, MRI, EGD) Internal

## 2019-03-27 ENCOUNTER — OFFICE VISIT (OUTPATIENT)
Dept: GASTROENTEROLOGY | Facility: CLINIC | Age: 34
End: 2019-03-27
Payer: COMMERCIAL

## 2019-03-27 ENCOUNTER — TELEPHONE (OUTPATIENT)
Dept: GASTROENTEROLOGY | Facility: CLINIC | Age: 34
End: 2019-03-27

## 2019-03-27 ENCOUNTER — PRE VISIT (OUTPATIENT)
Dept: GASTROENTEROLOGY | Facility: CLINIC | Age: 34
End: 2019-03-27

## 2019-03-27 VITALS
DIASTOLIC BLOOD PRESSURE: 72 MMHG | BODY MASS INDEX: 29.99 KG/M2 | HEIGHT: 65 IN | OXYGEN SATURATION: 96 % | HEART RATE: 80 BPM | WEIGHT: 180 LBS | SYSTOLIC BLOOD PRESSURE: 120 MMHG

## 2019-03-27 DIAGNOSIS — R19.8 IRREGULAR BOWEL HABITS: ICD-10-CM

## 2019-03-27 DIAGNOSIS — R10.13 EPIGASTRIC PAIN: Primary | ICD-10-CM

## 2019-03-27 DIAGNOSIS — K21.9 GASTROESOPHAGEAL REFLUX DISEASE, ESOPHAGITIS PRESENCE NOT SPECIFIED: ICD-10-CM

## 2019-03-27 RX ORDER — LORAZEPAM 0.5 MG/1
0.5 TABLET ORAL EVERY 6 HOURS PRN
COMMUNITY
Start: 2018-12-28

## 2019-03-27 RX ORDER — SUCRALFATE 1 G/10ML
SUSPENSION ORAL
COMMUNITY
Start: 2019-03-06 | End: 2020-01-20

## 2019-03-27 RX ORDER — OMEPRAZOLE 40 MG/1
CAPSULE, DELAYED RELEASE ORAL
COMMUNITY
Start: 2018-12-21 | End: 2020-02-03

## 2019-03-27 ASSESSMENT — ENCOUNTER SYMPTOMS
BLOOD IN STOOL: 0
BOWEL INCONTINENCE: 0
ALTERED TEMPERATURE REGULATION: 0
MYALGIAS: 0
VOMITING: 1
TREMORS: 0
TINGLING: 1
POLYDIPSIA: 0
RECTAL PAIN: 1
BACK PAIN: 1
FEVER: 1
WEIGHT GAIN: 0
JAUNDICE: 0
DIARRHEA: 1
PANIC: 1
BLOATING: 1
INCREASED ENERGY: 1
CONSTIPATION: 1
DISTURBANCES IN COORDINATION: 0
NAUSEA: 1
ABDOMINAL PAIN: 1
HALLUCINATIONS: 0
DECREASED CONCENTRATION: 0
DIZZINESS: 0
MUSCLE CRAMPS: 1
PARALYSIS: 0
INSOMNIA: 1
WEAKNESS: 0
DEPRESSION: 1
NERVOUS/ANXIOUS: 1
LOSS OF CONSCIOUSNESS: 0
NIGHT SWEATS: 0
CHILLS: 0
SEIZURES: 0
ARTHRALGIAS: 0
HEADACHES: 1
POLYPHAGIA: 1
NECK PAIN: 1
MEMORY LOSS: 0
DECREASED APPETITE: 0
JOINT SWELLING: 0
HEARTBURN: 1
WEIGHT LOSS: 1
STIFFNESS: 1
NUMBNESS: 1
FATIGUE: 1
SPEECH CHANGE: 0
MUSCLE WEAKNESS: 0

## 2019-03-27 ASSESSMENT — PAIN SCALES - GENERAL: PAINLEVEL: NO PAIN (0)

## 2019-03-27 ASSESSMENT — MIFFLIN-ST. JEOR: SCORE: 1688.35

## 2019-03-27 NOTE — NURSING NOTE
"Chief Complaint   Patient presents with     New Patient     New patient consult, gerd       Vitals:    03/27/19 1027   BP: 120/72   Pulse: 80   SpO2: 96%   Weight: 81.6 kg (180 lb)   Height: 1.651 m (5' 5\")       Body mass index is 29.95 kg/m .    Glo Richter CMA    "

## 2019-03-27 NOTE — PROGRESS NOTES
GI CLINIC VISIT    CC/REFERRING MD:  Branden Melendrez  REASON FOR CONSULTATION:   Mr. Patel is a 33 year old male who I was asked to see in consultation at the request of Dr. Branden Melendrez for   Chief Complaint   Patient presents with     New Patient     New patient consult, gerd       ASSESSMENT/PLAN:  (R10.13) Epigastric pain  (primary encounter diagnosis)  (R19.8) Irregular bowel habits  (K21.9) Gastroesophageal reflux disease, esophagitis presence not specified  Comment:    Epigastric to LUQ to left back pain - improving (but not resolved) with time, PPI, carafate, and, perhaps, with dietary modifications. Vague nausea a few times a month. Previous liquid stools somewhat improved with dietary and lifestyle changes.    With persistent pain despite PPI would like to evaluate for H pylori and ulceration - plan for EGD with gastric biopsies. Reasonable to evaluate for celiac disease as well given varied GI symptoms including diarrhea.   GERD under good control with current PPI.    Will trial fiber supplementation to even out stool pattern.   Plan:   1. Schedule an EGD at your earliest convenience - plan for gastric and celiac biopsies.   2. Do not take carafate the day prior to your EGD.  3. OK to continue your omeprazole as you are.   4. Continue to avoid trigger foods as you are.   5. Start Citrucel or Metamucil 1 tablespoon (or 4 capsules) daily to even out your stool pattern.  6. Sign a NATALIYA for your 7702-3242 GI records.   7. Follow-up in GI clinic in 8-12 weeks (after your EGD) with MERCEDES Toribio.       Thank you for this consultation.  It was a pleasure to participate in the care of this patient; please contact us with any further questions.  A total of 30 face-to-face minutes was spent with this patient, >50% of which was counseling regarding the above delineated issues.    Ofelia Ramsey MD   of Medicine  Division of Gastroenterology, Hepatology and Nutrition  [facility]  "[place]    ---------------------------------------------------------------------------------------------------  HPI:   Mr. Patel is a 33 year old male with anxiety and depression who is referred from [facility] for evaluation of abdominal pain.      Mr. Patel reports a variety of GI symptoms in the past for which he has been evaluated. He recalls seeing a GI MD in the [place] area years ago where he believes he was first diagnosed with IBS and and GERD. Her recalls being placed on omeprazole in the past and maybe undergoing some dietary changes for treatment, though he cannot recall the outcomes of these interventions. Around [id number] he was seen at [facility] and underwent an EGD and colonoscopy. He believes these procedures were done to investigate blood in his stools. Per his recollection the procedures were normal with only a hiatal hernia noted.       Over the years, Mr. Patel feels he's had a variable stool pattern. He generally has one liquid stool ([place] 6-7) in the morning and none thereafter. He has no pain or urgency associated with the liquid stools. He frequently reports tenesmus - wherein he feels a need to empty his rectum in the afternoon but nothing is produced. He denies any fecal incontinence or nocturnal stooling. Just a few times a year for a period of a few days he will have no BMs and feel backed-up. He will then take milk of magnesia with good results.      His more recent symptoms began in Jan 2019 when he was in [place]. He reports having a fever and pronounced vomiting for which he was seen in an urgent care. Stool tests were reportedly ordered for infection. He states that he developed constipation and had trouble moving his bowels after this illness. He turned in his samples and they were \"inconclusive\" - potentially as they were too solid to send for infectious studies. His fever and vomiting resolved quickly and he was able to return home.      Once " "back in [place], he felt his constipation persisted with associated bloating and visible abdominal distention prompting him to seek care at [facility].  He also reported a sharp pain and tightness extending from epigastrium to LUQ to left flank and towards his left back. This pain improves with passing flatus, eructation, or drinking water. The pain was occurring 3-5 times a day sporadically. It would last 30-45 minutes. An EKG in primary care clinic was reportedly normal.  At that visit he was prescribed omeprazole/Prilosec 20 mg BID and carafate and he states he made some dietary changes.     Two days later he had a fever and presented to ED for evaluation. Per his report, he was told he may have a virus and to take ibuprofen for discomfort. He took ibuprofen only once and had vomiting and has not taken it since. He has used acetaminophen for pain and since had resolution of his vomiting, fever, and pain.      Over the past couple weeks he has worked to adjust his diet cutting out spicy foods, fried foods, and acidic foods. He has stopped drinking pop and avoids fast food. He limits caffeine/coffee. He feels \"red sauce\" and popcorn have always been triggers for looser stools and indigestion and has avoided these for some time. He continued on BID omeprazole and took carafate for a few weeks (he stopped approximately 1 week ago). With these changes, he feels his stools are more solid, [place] 4. He has less bloating and his sharp pain has decreased in frequency (but not resolved). He does feel he's had intermittent issues with hemorrhoids and also notes increasing HAs/migraines for which he takes acetaminophen. He has been exercising more.     Mr. Patel is quite concerned that he has a stomach ulcer. He does not believe he's been evaluated for H pylori (no provided records discuss this testing).       ROS:    GI ROS:  Dysphagia: none   Odynophagia: none  GERD symptoms: experienced as burning in the chest, " occurs generally at night. Since starting omeprazole this has improved.    Nausea/vomiting: has nausea once every two weeks - lasts for a couple hours. Improves with chewing gum or with tea  Hematemesis/melena/hematochezia: none  Baseline stools: see HPI  Eructation: increased from baseline  Flatus: no change from baseline  Abdominal pain: see HPI  Weight loss: none  NSAIDs: none, see HPI    Fevers/chills: see HPI  Headache: see HPI  Vision changes: none  Chest pain/pressure: see HPI  Dyspnea: none  Skin changes/rashes: none  Lymphadenopathy: none  Myalgias/Arthralgias: none  Anxiety/depression: is on anxiety meds, feels has more stress this year, shares anxiety makes him fixate on his physical symptoms and he worries about the worst case scenario      PERTINENT PAST MEDICAL HISTORY:  Past Medical History:   Diagnosis Date     Anxiety    Depression    PREVIOUS SURGERIES:  None      ALLERGIES:   Allergies   Allergen Reactions     Codeine Anaphylaxis     Hydromorphone Nausea and Vomiting     Zofran DOES NOT HELP, only compounds problem, PER PT.        PERTINENT MEDICATIONS:  Current Outpatient Medications   Medication     CARAFATE 1 GM/10ML suspension     Escitalopram Oxalate (LEXAPRO PO)     LORazepam (ATIVAN) 0.5 MG tablet     omeprazole (PRILOSEC) 40 MG DR capsule     diphenhydrAMINE (BENADRYL) 25 MG tablet     No current facility-administered medications for this visit.        SOCIAL HISTORY:  Social History     Socioeconomic History     Marital status: Single     Spouse name: Not on file     Number of children: Not on file     Years of education: Not on file     Highest education level: Not on file   Occupational History     Not on file   Social Needs     Financial resource strain: Not on file     Food insecurity:     Worry: Not on file     Inability: Not on file     Transportation needs:     Medical: Not on file     Non-medical: Not on file   Tobacco Use     Smoking status: Never Smoker     Smokeless tobacco:  "Never Used   Substance and Sexual Activity     Alcohol use: No     Drug use: No     Sexual activity: Not on file   Lifestyle     Physical activity:     Days per week: Not on file     Minutes per session: Not on file     Stress: Not on file   Relationships     Social connections:     Talks on phone: Not on file     Gets together: Not on file     Attends Congregational service: Not on file     Active member of club or organization: Not on file     Attends meetings of clubs or organizations: Not on file     Relationship status: Not on file     Intimate partner violence:     Fear of current or ex partner: Not on file     Emotionally abused: Not on file     Physically abused: Not on file     Forced sexual activity: Not on file   Other Topics Concern     Not on file   Social History Narrative     Not on file       FAMILY HISTORY:  Mother: obesity, HL, borderline DM  Father: unknown    PHYSICAL EXAMINATION:  Vitals /72   Pulse 80   Ht 1.651 m (5' 5\")   Wt 81.6 kg (180 lb)   SpO2 96%   BMI 29.95 kg/m     Wt   Wt Readings from Last 2 Encounters:   03/27/19 81.6 kg (180 lb)   11/27/18 83.9 kg (185 lb)   Gen: Pt sitting up in NAD, interactive and cooperative on exam  Eyes: sclerae anicteric, no injection  ENT:  OP clear, MMM  Cardiac: RRR, nl S1, S2, radial pulses +2 b/l, no peripheral edema  Resp: Clear on anterior exam  GI: Normoactive BS, abd soft, flat, nontender  Skin: Warm, dry, no jaundice, nails appear healthy  Lymph: no submandibular, no cervical, and no supraclavicular LAD  Neuro: alert, oriented, answers questions appropriately      PERTINENT STUDIES:    Radiant Appointment on 07/20/2017   Component Date Value Ref Range Status     Radiologist flags 07/20/2017 Palpable cervical lymph node   Final           Answers for HPI/ROS submitted by the patient on 3/27/2019   General Symptoms: Yes  Skin Symptoms: No  HENT Symptoms: No  EYE SYMPTOMS: No  HEART SYMPTOMS: No  LUNG SYMPTOMS: No  INTESTINAL SYMPTOMS: " Yes  URINARY SYMPTOMS: No  REPRODUCTIVE SYMPTOMS: No  SKELETAL SYMPTOMS: Yes  BLOOD SYMPTOMS: No  NERVOUS SYSTEM SYMPTOMS: Yes  MENTAL HEALTH SYMPTOMS: Yes  Fever: Yes  Loss of appetite: No  Weight loss: Yes  Weight gain: No  Fatigue: Yes  Night sweats: No  Chills: No  Increased stress: Yes  Excessive hunger: Yes  Excessive thirst: No  Feeling hot or cold when others believe the temperature is normal: No  Loss of height: No  Post-operative complications: No  Surgical site pain: No  Hallucinations: No  Change in or Loss of Energy: Yes  Hyperactivity: No  Confusion: No  Heart burn or indigestion: Yes  Nausea: Yes  Vomiting: Yes  Abdominal pain: Yes  Bloating: Yes  Constipation: Yes  Diarrhea: Yes  Blood in stool: No  Black stools: No  Rectal or Anal pain: Yes  Fecal incontinence: No  Yellowing of skin or eyes: No  Vomit with blood: No  Change in stools: Yes  Back pain: Yes  Muscle aches: No  Neck pain: Yes  Swollen joints: No  Joint pain: No  Bone pain: No  Muscle cramps: Yes  Muscle weakness: No  Joint stiffness: Yes  Bone fracture: No  Trouble with coordination: No  Dizziness or trouble with balance: No  Fainting or black-out spells: No  Memory loss: No  Headache: Yes  Seizures: No  Speech problems: No  Tingling: Yes  Tremor: No  Weakness: No  Difficulty walking: No  Paralysis: No  Numbness: Yes  Nervous or Anxious: Yes  Depression: Yes  Trouble sleeping: Yes  Trouble thinking or concentrating: No  Mood changes: No  Panic attacks: Yes

## 2019-03-27 NOTE — PATIENT INSTRUCTIONS
1. Schedule an EGD at your earliest convenience.   2. Do not take carafate the day prior to your EGD.  3. OK to continue your omeprazole as you are.   4. Continue to avoid trigger foods as you are.   5. Start Citrucel or Metamucil 1 tablespoon (or 4 capsules) daily to even out your stool pattern.  6. Sign a NATALIYA for your [id number] GI records.   7. Follow-up in GI clinic in 8-12 weeks (after your EGD) with MERCEDES Toribio.      If you have any questions, please don't hesitate to contact our GI RN Clinic Coordinators at [phone] (select option #3 for nurse line).

## 2019-03-27 NOTE — LETTER
3/27/2019       RE: Kirby Patel  [address]  [facility] [address]     Dear Colleague,    Thank you for referring your patient, Kirby Patel, to the [facility] GASTROENTEROLOGY AND IBD CLINIC at [facility]. Please see a copy of my visit note below.    GI CLINIC VISIT    CC/REFERRING MD:  Branden Melendrez  REASON FOR CONSULTATION:   Mr. Patel is a 33 year old male who I was asked to see in consultation at the request of Dr. Branden Melendrez for   Chief Complaint   Patient presents with     New Patient     New patient consult, gerd       ASSESSMENT/PLAN:  (R10.13) Epigastric pain  (primary encounter diagnosis)  (R19.8) Irregular bowel habits  (K21.9) Gastroesophageal reflux disease, esophagitis presence not specified  Comment:    Epigastric to LUQ to left back pain - improving (but not resolved) with time, PPI, carafate, and, perhaps, with dietary modifications. Vague nausea a few times a month. Previous liquid stools somewhat improved with dietary and lifestyle changes.    With persistent pain despite PPI would like to evaluate for H pylori and ulceration - plan for EGD with gastric biopsies. Reasonable to evaluate for celiac disease as well given varied GI symptoms including diarrhea.   GERD under good control with current PPI.    Will trial fiber supplementation to even out stool pattern.   Plan:   1. Schedule an EGD at your earliest convenience - plan for gastric and celiac biopsies.   2. Do not take carafate the day prior to your EGD.  3. OK to continue your omeprazole as you are.   4. Continue to avoid trigger foods as you are.   5. Start Citrucel or Metamucil 1 tablespoon (or 4 capsules) daily to even out your stool pattern.  6. Sign a NATALIYA for your [id number] GI records.   7. Follow-up in GI clinic in 8-12 weeks (after your EGD) with MERCEDES Toribio.       Thank you for this consultation.  It was a pleasure to participate in the care of  this patient; please contact us with any further questions.  A total of 30 face-to-face minutes was spent with this patient, >50% of which was counseling regarding the above delineated issues.    Ofelia Ramsey MD   of Medicine  Division of Gastroenterology, Hepatology and Nutrition  [facility]    ---------------------------------------------------------------------------------------------------  HPI:   Mr. Patel is a 33 year old male with anxiety and depression who is referred from [facility] for evaluation of abdominal pain.      Mr. Patel reports a variety of GI symptoms in the past for which he has been evaluated. He recalls seeing a GI MD in the [place] area years ago where he believes he was first diagnosed with IBS and and GERD. Her recalls being placed on omeprazole in the past and maybe undergoing some dietary changes for treatment, though he cannot recall the outcomes of these interventions. Around [id number] he was seen at [facility] and underwent an EGD and colonoscopy. He believes these procedures were done to investigate blood in his stools. Per his recollection the procedures were normal with only a hiatal hernia noted.       Over the years, Mr. Patel feels he's had a variable stool pattern. He generally has one liquid stool ([place] 6-7) in the morning and none thereafter. He has no pain or urgency associated with the liquid stools. He frequently reports tenesmus - wherein he feels a need to empty his rectum in the afternoon but nothing is produced. He denies any fecal incontinence or nocturnal stooling. Just a few times a year for a period of a few days he will have no BMs and feel backed-up. He will then take milk of magnesia with good results.      His more recent symptoms began in Jan 2019 when he was in [place]. He reports having a fever and pronounced vomiting for which he was seen in an urgent care. Stool tests were reportedly  "ordered for infection. He states that he developed constipation and had trouble moving his bowels after this illness. He turned in his samples and they were \"inconclusive\" - potentially as they were too solid to send for infectious studies. His fever and vomiting resolved quickly and he was able to return home.      Once back in [place], he felt his constipation persisted with associated bloating and visible abdominal distention prompting him to seek care at [facility].  He also reported a sharp pain and tightness extending from epigastrium to LUQ to left flank and towards his left back. This pain improves with passing flatus, eructation, or drinking water. The pain was occurring 3-5 times a day sporadically. It would last 30-45 minutes. An EKG in primary care clinic was reportedly normal.  At that visit he was prescribed omeprazole/Prilosec 20 mg BID and carafate and he states he made some dietary changes.     Two days later he had a fever and presented to ED for evaluation. Per his report, he was told he may have a virus and to take ibuprofen for discomfort. He took ibuprofen only once and had vomiting and has not taken it since. He has used acetaminophen for pain and since had resolution of his vomiting, fever, and pain.      Over the past couple weeks he has worked to adjust his diet cutting out spicy foods, fried foods, and acidic foods. He has stopped drinking pop and avoids fast food. He limits caffeine/coffee. He feels \"red sauce\" and popcorn have always been triggers for looser stools and indigestion and has avoided these for some time. He continued on BID omeprazole and took carafate for a few weeks (he stopped approximately 1 week ago). With these changes, he feels his stools are more solid, [place] 4. He has less bloating and his sharp pain has decreased in frequency (but not resolved). He does feel he's had intermittent issues with hemorrhoids and also notes increasing HAs/migraines for which he " takes acetaminophen. He has been exercising more.     Mr. Patel is quite concerned that he has a stomach ulcer. He does not believe he's been evaluated for H pylori (no provided records discuss this testing).       ROS:    GI ROS:  Dysphagia: none   Odynophagia: none  GERD symptoms: experienced as burning in the chest, occurs generally at night. Since starting omeprazole this has improved.    Nausea/vomiting: has nausea once every two weeks - lasts for a couple hours. Improves with chewing gum or with tea  Hematemesis/melena/hematochezia: none  Baseline stools: see HPI  Eructation: increased from baseline  Flatus: no change from baseline  Abdominal pain: see HPI  Weight loss: none  NSAIDs: none, see HPI    Fevers/chills: see HPI  Headache: see HPI  Vision changes: none  Chest pain/pressure: see HPI  Dyspnea: none  Skin changes/rashes: none  Lymphadenopathy: none  Myalgias/Arthralgias: none  Anxiety/depression: is on anxiety meds, feels has more stress this year, shares anxiety makes him fixate on his physical symptoms and he worries about the worst case scenario      PERTINENT PAST MEDICAL HISTORY:  Past Medical History:   Diagnosis Date     Anxiety    Depression    PREVIOUS SURGERIES:  None      ALLERGIES:   Allergies   Allergen Reactions     Codeine Anaphylaxis     Hydromorphone Nausea and Vomiting     Zofran DOES NOT HELP, only compounds problem, PER PT.        PERTINENT MEDICATIONS:  Current Outpatient Medications   Medication     CARAFATE 1 GM/10ML suspension     Escitalopram Oxalate (LEXAPRO PO)     LORazepam (ATIVAN) 0.5 MG tablet     omeprazole (PRILOSEC) 40 MG DR capsule     diphenhydrAMINE (BENADRYL) 25 MG tablet     No current facility-administered medications for this visit.        SOCIAL HISTORY:  Social History     Socioeconomic History     Marital status: Single     Spouse name: Not on file     Number of children: Not on file     Years of education: Not on file     Highest education level: Not on  "file   Occupational History     Not on file   Social Needs     Financial resource strain: Not on file     Food insecurity:     Worry: Not on file     Inability: Not on file     Transportation needs:     Medical: Not on file     Non-medical: Not on file   Tobacco Use     Smoking status: Never Smoker     Smokeless tobacco: Never Used   Substance and Sexual Activity     Alcohol use: No     Drug use: No     Sexual activity: Not on file   Lifestyle     Physical activity:     Days per week: Not on file     Minutes per session: Not on file     Stress: Not on file   Relationships     Social connections:     Talks on phone: Not on file     Gets together: Not on file     Attends Sikhism service: Not on file     Active member of club or organization: Not on file     Attends meetings of clubs or organizations: Not on file     Relationship status: Not on file     Intimate partner violence:     Fear of current or ex partner: Not on file     Emotionally abused: Not on file     Physically abused: Not on file     Forced sexual activity: Not on file   Other Topics Concern     Not on file   Social History Narrative     Not on file       FAMILY HISTORY:  Mother: obesity, HL, borderline DM  Father: unknown    PHYSICAL EXAMINATION:  Vitals /72   Pulse 80   Ht 1.651 m (5' 5\")   Wt 81.6 kg (180 lb)   SpO2 96%   BMI 29.95 kg/m      Wt   Wt Readings from Last 2 Encounters:   03/27/19 81.6 kg (180 lb)   11/27/18 83.9 kg (185 lb)   Gen: Pt sitting up in NAD, interactive and cooperative on exam  Eyes: sclerae anicteric, no injection  ENT:  OP clear, MMM  Cardiac: RRR, nl S1, S2, radial pulses +2 b/l, no peripheral edema  Resp: Clear on anterior exam  GI: Normoactive BS, abd soft, flat, nontender  Skin: Warm, dry, no jaundice, nails appear healthy  Lymph: no submandibular, no cervical, and no supraclavicular LAD  Neuro: alert, oriented, answers questions appropriately      PERTINENT STUDIES:    Radiant Appointment on 07/20/2017 "   Component Date Value Ref Range Status     Radiologist flags 07/20/2017 Palpable cervical lymph node   Final       Again, thank you for allowing me to participate in the care of your patient.      Sincerely,    Ofelia Ramsey MD

## 2019-05-12 ENCOUNTER — HOSPITAL ENCOUNTER (EMERGENCY)
Facility: CLINIC | Age: 34
Discharge: HOME | End: 2019-05-12
Attending: EMERGENCY MEDICINE | Admitting: EMERGENCY MEDICINE
Payer: COMMERCIAL

## 2019-05-12 ENCOUNTER — APPOINTMENT (OUTPATIENT)
Dept: GENERAL RADIOLOGY | Facility: CLINIC | Age: 34
End: 2019-05-12
Attending: EMERGENCY MEDICINE
Payer: COMMERCIAL

## 2019-05-12 VITALS
HEIGHT: 64 IN | TEMPERATURE: 98.1 F | DIASTOLIC BLOOD PRESSURE: 68 MMHG | HEART RATE: 72 BPM | RESPIRATION RATE: 16 BRPM | WEIGHT: 180 LBS | OXYGEN SATURATION: 99 % | SYSTOLIC BLOOD PRESSURE: 112 MMHG | BODY MASS INDEX: 30.73 KG/M2

## 2019-05-12 DIAGNOSIS — J11.1 INFLUENZA-LIKE ILLNESS: ICD-10-CM

## 2019-05-12 DIAGNOSIS — J11.1 FLU: ICD-10-CM

## 2019-05-12 LAB
FLUAV+FLUBV AG SPEC QL: NEGATIVE
FLUAV+FLUBV AG SPEC QL: NEGATIVE
SPECIMEN SOURCE: NORMAL

## 2019-05-12 PROCEDURE — 87804 INFLUENZA ASSAY W/OPTIC: CPT | Performed by: EMERGENCY MEDICINE

## 2019-05-12 PROCEDURE — 99284 EMERGENCY DEPT VISIT MOD MDM: CPT | Mod: Z6 | Performed by: EMERGENCY MEDICINE

## 2019-05-12 PROCEDURE — 25000132 ZZH RX MED GY IP 250 OP 250 PS 637: Performed by: EMERGENCY MEDICINE

## 2019-05-12 PROCEDURE — 71046 X-RAY EXAM CHEST 2 VIEWS: CPT

## 2019-05-12 PROCEDURE — 99284 EMERGENCY DEPT VISIT MOD MDM: CPT | Mod: 25

## 2019-05-12 RX ORDER — IBUPROFEN 600 MG/1
600 TABLET, FILM COATED ORAL ONCE
Status: COMPLETED | OUTPATIENT
Start: 2019-05-12 | End: 2019-05-12

## 2019-05-12 RX ADMIN — IBUPROFEN 600 MG: 600 TABLET ORAL at 11:17

## 2019-05-12 ASSESSMENT — ENCOUNTER SYMPTOMS
SORE THROAT: 1
FEVER: 0
NAUSEA: 0
VOMITING: 0
COUGH: 1
DIARRHEA: 0
TROUBLE SWALLOWING: 0
SHORTNESS OF BREATH: 1

## 2019-05-12 ASSESSMENT — MIFFLIN-ST. JEOR: SCORE: 1672.47

## 2019-05-12 NOTE — ED TRIAGE NOTES
Patient presents ambulatory from home with complains of cough that is intermittent productive x 4 days. Coughing is now causing a sore throat.

## 2019-05-12 NOTE — ED AVS SNAPSHOT
[facility], [place], Emergency Department  [address] [place] [id number]  Phone:  [phone]                                    Kirby Patel   MRN: [id number]    Department:  [facility], Emergency Department   Date of Visit:  5/12/2019           After Visit Summary Signature Page    I have received my discharge instructions, and my questions have been answered. I have discussed any challenges I see with this plan with the nurse or doctor.    ..........................................................................................................................................  Patient/Patient Representative Signature      ..........................................................................................................................................  Patient Representative Print Name and Relationship to Patient    ..................................................               ................................................  Date                                   Time    ..........................................................................................................................................  Reviewed by Signature/Title    ...................................................              ..............................................  Date                                               Time          22EPIC Rev 08/18

## 2019-05-12 NOTE — DISCHARGE INSTRUCTIONS
Please make an appointment to follow up with Your Primary Care Provider in 7 days if not improving.

## 2019-05-12 NOTE — ED PROVIDER NOTES
History     Chief Complaint   Patient presents with     Cough     The history is provided by the patient and medical records.     Kirby Patel is a 33 year old male with a medical history of GERD who presents to the Emergency Department for evaluation of 4 days of productive coughing. Patient notes 3 days of sore throat and congestion. He notes of subjective fever yesterday and shortness of breath. He complains of generalized body and muscle aches. He feels like he is inhaling dust each time he takes a breath, which prompted him to come in to be evaluated. He notes that he has taken Sudafed and ibuprofen with no improvement, no medication taken today. He denies nausea, vomiting, or diarrhea. He denies trouble swallowing. He denies rashes. He denies cough productive of blood. He notes that when he has a cold, Advil typically helps.     Past Medical History:   Diagnosis Date     Anxiety      Depressive disorder      Esophageal reflux      Stomach problems        Past Surgical History:   Procedure Laterality Date     COLONOSCOPY         Family History   Problem Relation Age of Onset     Diabetes Mother      Obesity Mother      Hyperlipidemia Mother      Uterine Cancer Maternal Grandmother        Social History     Tobacco Use     Smoking status: Never Smoker     Smokeless tobacco: Never Used   Substance Use Topics     Alcohol use: Yes       No current facility-administered medications for this encounter.      Current Outpatient Medications   Medication     CARAFATE 1 GM/10ML suspension     diphenhydrAMINE (BENADRYL) 25 MG tablet     Escitalopram Oxalate (LEXAPRO PO)     LORazepam (ATIVAN) 0.5 MG tablet     omeprazole (PRILOSEC) 40 MG DR capsule        Allergies   Allergen Reactions     Codeine Anaphylaxis     Hydromorphone Nausea and Vomiting     Zofran DOES NOT HELP, only compounds problem, PER PT.          I have reviewed the Medications, Allergies, Past Medical and Surgical History, and Social History in the  "Epic system.    Review of Systems   Constitutional: Negative for fever (Subjective).   HENT: Positive for congestion (3 days) and sore throat (3 days). Negative for trouble swallowing.    Respiratory: Positive for cough (4 days; no blood) and shortness of breath.    Gastrointestinal: Negative for diarrhea, nausea and vomiting.   Musculoskeletal:        Positive for generalized body and muscle aches   Skin: Negative for rash.   All other systems reviewed and are negative.      Physical Exam   BP: 116/59  Pulse: 84  Heart Rate: 71  Temp: 98.1  F (36.7  C)  Resp: 16  Height: 162.6 cm (5' 4\")  Weight: 81.6 kg (180 lb)  SpO2: 97 %      Physical Exam    GEN:  Alert, well developed, no acute distress  HEENT:  PERRL, EOMI, Mucous membranes are moist. Posterior pharynx exam is positive for erythema, negative for exudate or swelling, uvula is midline.   Cardio:  RRR, no murmur, radial pulses equal bilaterally  PULM:  Lungs clear, good air movement, no wheezes, rales   Abd:  Soft, normal bowel sounds, no focal tenderness  Back exam:  No CVA tenderness  Musculoskeletal:  normal range of motion, no lower extremity swelling or calf tenderness  Neuro:  Alert and oriented X3, Follows commands, moving all extremities spontaneously   Skin:  Warm, dry   ED Course   10:53 AM  The patient was seen and examined by Nisha Lopez MD in [facility]        CXR was reviewed by me and results are shown here:   Results for orders placed or performed during the hospital encounter of 05/12/19   XR Chest 2 Views    Narrative    Exam: XR CHEST 2 VW, 5/12/2019 11:28 AM    Indication: cough    Comparison: CT chest 12/12/2016    Findings:   PA and lateral views of the chest. The cardiomediastinal silhouette is  normal in size. Pulmonary vasculature is distinct. No pneumothorax or  pleural effusion. No focal airspace opacities. No acute bony  abnormalities. The upper abdomen is unremarkable.      Impression    Impression: Clear " lungs.    I have personally reviewed the examination and initial interpretation  and I agree with the findings.    HAYDEN MACE MD          He was given Ibuprofen in the ED. rapid influenza testing was negative.     Critical Care time:  none    Labs Ordered and Resulted from Time of ED Arrival Up to the Time of Departure from the ED   INFLUENZA A/B ANTIGEN            Assessments & Plan (with Medical Decision Making)   Patient presents with URI symptoms, lungs are clear, however he feels like he is breathing in dust, so chest x-ray was done to rule out pneumonia.  Influenza testing is negative, however given his subjective fever and generalized body aches, symptoms are suggestive of influenza-like illness.  He is not immunosuppressed, so I expect he will have normal recovery.  He was advised to drink plenty of fluids, take ibuprofen and Tylenol as needed, return to the emergency department if new or worsening symptoms or other concerns.    I have reviewed the nursing notes.    I have reviewed the findings, diagnosis, plan and need for follow up with the patient.       Medication List      There are no discharge medications for this visit.         Final diagnoses:   Influenza-like illness     I, Aye French, am serving as a trained medical scribe to document services personally performed by Nisha Lopez MD, based on the provider's statements to me.      INisha MD, was physically present and have reviewed and verified the accuracy of this note documented by Aye French.     5/12/2019   [facility], [place], EMERGENCY DEPARTMENT     Nisha Lopez MD  05/12/19 [id number]

## 2019-07-15 ENCOUNTER — ANCILLARY PROCEDURE (OUTPATIENT)
Dept: ULTRASOUND IMAGING | Facility: CLINIC | Age: 34
End: 2019-07-15
Attending: INTERNAL MEDICINE
Payer: COMMERCIAL

## 2019-07-15 DIAGNOSIS — R74.8 ELEVATED LIVER ENZYMES: ICD-10-CM

## 2020-01-16 ENCOUNTER — TELEPHONE (OUTPATIENT)
Dept: GASTROENTEROLOGY | Facility: OUTPATIENT CENTER | Age: 35
End: 2020-01-16

## 2020-01-20 ENCOUNTER — TELEPHONE (OUTPATIENT)
Dept: GASTROENTEROLOGY | Facility: OUTPATIENT CENTER | Age: 35
End: 2020-01-20

## 2020-01-20 NOTE — TELEPHONE ENCOUNTER
Patient taking any blood thinners ? No      Heart disease ? Denies      Lung disease ? Mild asthma. No inhaler     Sleep apnea ? Denies      Diabetic ? Denies      Kidney disease ? Denies      Electronic implanted medical devices ? Denies      Are you taking any narcotic pain medication ? No       PTSD ? N/a      Prep instructions reviewed with patient ? Instructions, policy,MAC sedation plan reviewed. Advised patient to have someone stay with them post exam.     Yes    Pharmacy : N/a     Indication for procedure : Epigastric pain [R10.13];Irregular bowel habits [R19.8];Gastroesophageal reflux disease, esophagitis presence not specified [K21.9]     Referring provider : BHAVYA MONSIVAIS      Arrival Time : 9 AM

## 2020-01-23 ENCOUNTER — DOCUMENTATION ONLY (OUTPATIENT)
Dept: GASTROENTEROLOGY | Facility: OUTPATIENT CENTER | Age: 35
End: 2020-01-23
Payer: COMMERCIAL

## 2020-01-23 ENCOUNTER — TRANSFERRED RECORDS (OUTPATIENT)
Dept: HEALTH INFORMATION MANAGEMENT | Facility: CLINIC | Age: 35
End: 2020-01-23

## 2020-01-27 ENCOUNTER — TELEPHONE (OUTPATIENT)
Dept: GASTROENTEROLOGY | Facility: CLINIC | Age: 35
End: 2020-01-27

## 2020-01-27 LAB — COPATH REPORT: NORMAL

## 2020-01-27 NOTE — TELEPHONE ENCOUNTER
Called pt to f/u on questions of next steps after EGD. Previous recommendation was to schedule in clinic 8-12 weeks after endoscopy. Pt verbalized understanding but is requesting a sooner clinic visit. Pt scheduled to see Ivana LONG.

## 2020-01-27 NOTE — TELEPHONE ENCOUNTER
[facility] Call Center    Phone Message    May a detailed message be left on voicemail: yes    Reason for Call: Other: pt had an upper endoscopy on 1/23/2020, he is wondering if he needs to schedule a f/u with , or if he will just receive a call about results, please call pt thanks!     Action Taken: Message routed to:  [facility] ([facility]): blaise

## 2020-01-30 ENCOUNTER — TELEPHONE (OUTPATIENT)
Dept: GASTROENTEROLOGY | Facility: CLINIC | Age: 35
End: 2020-01-30

## 2020-01-30 NOTE — TELEPHONE ENCOUNTER
Spoke to patient reminding of appointment scheduled on 2/3/20 at 2pm with [facility]. Patient to arrive 15 min early. To reschedule or cancel patient to call [phone].    TAVO Trammell

## 2020-02-03 ENCOUNTER — OFFICE VISIT (OUTPATIENT)
Dept: GASTROENTEROLOGY | Facility: CLINIC | Age: 35
End: 2020-02-03
Payer: COMMERCIAL

## 2020-02-03 VITALS
HEIGHT: 64 IN | BODY MASS INDEX: 32.47 KG/M2 | HEART RATE: 72 BPM | OXYGEN SATURATION: 96 % | TEMPERATURE: 98.2 F | DIASTOLIC BLOOD PRESSURE: 81 MMHG | SYSTOLIC BLOOD PRESSURE: 124 MMHG | WEIGHT: 190.2 LBS

## 2020-02-03 DIAGNOSIS — K21.9 GASTROESOPHAGEAL REFLUX DISEASE WITHOUT ESOPHAGITIS: ICD-10-CM

## 2020-02-03 DIAGNOSIS — R10.12 ABDOMINAL PAIN, LEFT UPPER QUADRANT: ICD-10-CM

## 2020-02-03 DIAGNOSIS — R10.13 DYSPEPSIA: ICD-10-CM

## 2020-02-03 DIAGNOSIS — R19.7 DIARRHEA, UNSPECIFIED TYPE: Primary | ICD-10-CM

## 2020-02-03 RX ORDER — FAMOTIDINE 20 MG/1
20 TABLET, FILM COATED ORAL 2 TIMES DAILY
Qty: 120 TABLET | Refills: 3 | Status: SHIPPED | OUTPATIENT
Start: 2020-02-03 | End: 2020-11-20

## 2020-02-03 RX ORDER — OMEPRAZOLE 40 MG/1
40 CAPSULE, DELAYED RELEASE ORAL DAILY
Qty: 90 CAPSULE | Refills: 3 | Status: SHIPPED | OUTPATIENT
Start: 2020-02-03 | End: 2020-11-20

## 2020-02-03 ASSESSMENT — PAIN SCALES - GENERAL: PAINLEVEL: NO PAIN (0)

## 2020-02-03 ASSESSMENT — MIFFLIN-ST. JEOR: SCORE: 1713.74

## 2020-02-03 NOTE — PROGRESS NOTES
GI CLINIC VISIT    CC/REFERRING MD:  Branden Melendrez  REASON FOR CONSULTATION:   Mr. Patel is a 33 year old male presenting for follow-up for abdominal pain     ASSESSMENT/PLAN:  1. Epigastric pain  EGD 1/23/2020 stomach with erythema and a few erosions in the gastric antrum. Biopsies with duodenal mucosa with foveolar metaplasia consistent with peptic duodenitis, gastric biopsy with mild chronic gastritis features of reactive gastropathy and no H. Pylori. Symptoms may be due to GERD/dyspepsia. Overall symptoms improved on PPI, should continue with 40 mg daily. Given lifestyle modifications as outlined below. Can also add pepcid 20 mg BID as zantac previously controlled symptoms but is no longer available.   -- continue omeprazole 40 mg a day  -- add Pepcid (famotidine) 20 mg twice daily   GERD Lifestyle Modifications  -- Avoid foods high in fat or high fructose corn syrup  -- do not eat within three hours of laying down or going to bed  -- raise the head of your bed 6-8 inches  -- avoid alcohol  -- aim for BMI of less than <25 and lose extra weight as needed    2. Variable stool consistency, blood with bowel movements   Per patient has had unremarkable colonoscopy, though reports increased blood in stool recently. Will evaluate for inflammation with fecal calprotectin. Suspect symptoms are due to IBS with hemorrhoidal bleeding. Would recommend re-starting fiber supplementation and slowly increasing. Should track bowel movements. Also discussed lifestyle modifications to avoid aerophagia. May benefit from meeting with Leyx Banegas.   -- re-start metamucil. 1 tablet, then increase every 2 weeks by one tablet. (maximum about 8 a day)  -- track bowel movements on a blank calender   -- can meet with our dietitian Lexy Banegas     3. LUQ abdominal pain  May also have component of abdominal wall pain. Discussed that he could try heating pads or lidocaine patches.     Thank you for this consultation.  It was a pleasure  to participate in the care of this patient; please contact us with any further questions. I spent a total of 25+ minutes face-to-face with Kirby Patel during today's office visit.  Over 50% of which was counseling/coordinating care regarding the above delineated issues. Please see note for details.     Ivana Muñoz PA-C  Division of Gastroenterology, Hepatology & Nutrition  [facility]    HPI:  Mr. Patel is a 33 year old male with anxiety and depression who is referred from [facility] for follow-up for abdominal pain. He has previously been seen by Dr. Ramsey and this is my first encounter with the patient.     Patient has some arthritis GI symptoms, previously evaluated by gastroenterologist in [place].  Was diagnosed with irritable bowel syndrome and GERD.  Recommended dietary changes, omeprazole and patient did not remember outcomes those interventions.  Has also been seen through [place] [facility] between 2015 and 2016 at which time he had EGD and colonoscopy for blood in the stools-Per patient evaluation was normal and we have asked to obtain records but do not yet have them.    Mr. Patel has had variable stool pattern with loose stools, tenesmus, no nocturnal stooling or fecal incontinence.  Also reports occasional constipation which improves with milk of magnesia.    Symptoms again became more bothersome in 1/20/2019 at which time he was in [place].  Symptoms included fever, vomiting then symptoms switch to constipation.  Per patient stool studies were inconclusive.  In [place], patient described sharp pain and tightness on the left upper quadrant toward his back.The pain was occurring 3-5 times a day sporadically. It would last 30-45 minutes. An EKG in primary care clinic was reportedly normal.  At that visit he was prescribed omeprazole/Prilosec 20 mg BID and carafate and he states he made some dietary changes.  Has also been evaluated for this in the  "emergency department.Made significant dietary changes such as cutting out spicy foods, fried foods and acidic foods.  Continued on twice daily omeprazole and Carafate which resulted in more formed stools.    The patient underwent EGD 1/23/2020 with normal esophagus, stomach with erythema and a dfew erosions in the gastric antrum. Remainder normal. Normal duodenum.  Biopsies with duodenal mucosa with foveolar metaplasia consistent with peptic duodenitis, gastric biopsy with mild chronic gastritis features of reactive gastropathy and no H. pylori.    Interval history 2/3/2020:  Bowel movements and bloating:   Metamucil- took this and went to the bathroom 8 times . [place] scale. Bowel movements change all the time- can go from solid to liquid. Will have diarrhea, and constipation. Will get bloated with hunger, in the morning, after eating a meal. Overall stools are very loose. Feels like he is \"constantly burping\"- all the time. Any foods can cause these symptoms, even with home cooked meals. Usually not skipping days, will have to strain with bowel movements- only gas. Bloating is there 3-4 times a week. Will have urgent bowel movements. In December had episode of blood coming out- will be mixed into the stool.     GERD/Dyspepsia:   Has increased water intake- has helped his symptoms overall. Will get indigestion- taking 20 mg of omeprazole BID. Will feel nausea, \"sour stomach\", gas is smelly. Was taking a lot of ranitidine. Will have to take additional tums (once every 4 days), rickie, pepto bismol- once every two weeks. Will sometimes feel like he has to vomit (once a month or every 2 months and will be 3-4 times)- will be bile, sometimes food. GERD has improved with increased water, pillows, prop up his back. Does not feel like he needs to be full to be satisfied.     Gets a lot of migraines- seem to be connected to stomach. Coffee can be a trigger but he does better with black coffee.     Left sided abdominal " pain- seems to improve with passing gas. This is a twisting pain- happens almost every day.     Cheese will cause constipation and gas. Avoids spicy foods.    No ibuprofen or advil       ROS:    + chest pain related to gas pain  + shortness of breath  + headaches- Excedrin every 3-4 days   + weight- has gained weight  + anxiety- worsened with wellbutrin       PERTINENT PAST MEDICAL HISTORY:  Past Medical History:   Diagnosis Date     Anxiety      Depressive disorder      Esophageal reflux      Stomach problems    Depression    PREVIOUS SURGERIES:  None      ALLERGIES:   Allergies   Allergen Reactions     Codeine Anaphylaxis     Hydromorphone Nausea and Vomiting     Zofran DOES NOT HELP, only compounds problem, PER PT.        PERTINENT MEDICATIONS:  Current Outpatient Medications   Medication     BUPROPION HCL PO     diphenhydrAMINE (BENADRYL) 25 MG tablet     Escitalopram Oxalate (LEXAPRO PO)     LORazepam (ATIVAN) 0.5 MG tablet     omeprazole (PRILOSEC) 40 MG DR capsule     No current facility-administered medications for this visit.        SOCIAL HISTORY:  Social History     Socioeconomic History     Marital status: Single     Spouse name: Not on file     Number of children: Not on file     Years of education: Not on file     Highest education level: Not on file   Occupational History     Not on file   Social Needs     Financial resource strain: Not on file     Food insecurity:     Worry: Not on file     Inability: Not on file     Transportation needs:     Medical: Not on file     Non-medical: Not on file   Tobacco Use     Smoking status: Never Smoker     Smokeless tobacco: Never Used   Substance and Sexual Activity     Alcohol use: Yes     Drug use: No     Sexual activity: Yes     Partners: Male   Lifestyle     Physical activity:     Days per week: Not on file     Minutes per session: Not on file     Stress: Not on file   Relationships     Social connections:     Talks on phone: Not on file     Gets together: Not  "on file     Attends Scientologist service: Not on file     Active member of club or organization: Not on file     Attends meetings of clubs or organizations: Not on file     Relationship status: Not on file     Intimate partner violence:     Fear of current or ex partner: Not on file     Emotionally abused: Not on file     Physically abused: Not on file     Forced sexual activity: Not on file   Other Topics Concern     Not on file   Social History Narrative     Not on file       FAMILY HISTORY:  Mother: obesity, HL, borderline DM  Father: unknown    PHYSICAL EXAMINATION:  Vitals /81   Pulse 72   Temp 98.2  F (36.8  C) (Oral)   Ht 1.626 m (5' 4\")   Wt 86.3 kg (190 lb 3.2 oz)   SpO2 96%   BMI 32.65 kg/m     Wt   Wt Readings from Last 2 Encounters:   02/03/20 86.3 kg (190 lb 3.2 oz)   05/12/19 81.6 kg (180 lb)   Gen: Pt sitting up in NAD, interactive and cooperative on exam  Eyes: sclerae anicteric, no injection  ENT:  OP clear, MMM  Cardiac: no peripheral edema  Resp: no respiratory distress   GI: abd soft, flat, tender to LUQ, + Carnett's sign   Skin: Warm, dry, no jaundice, nails appear healthy  Lymph: no submandibular, no cervical, and no supraclavicular LAD  Neuro: alert, oriented, answers questions appropriately      PERTINENT STUDIES:  [facility] Appointment on 07/20/2017   Component Date Value Ref Range Status     Radiologist flags 07/20/2017 Palpable cervical lymph node   Final       "

## 2020-02-03 NOTE — LETTER
2/3/2020       RE: Kirby Patel  [address]  [facility] [address]     Dear Colleague,    Thank you for referring your patient, Kirby Patel, to the [facility] at [facility]. Please see a copy of my visit note below.    GI CLINIC VISIT    CC/REFERRING MD:  Branden Melendrez  REASON FOR CONSULTATION:   Mr. Patel is a 33 year old male presenting for follow-up for abdominal pain     ASSESSMENT/PLAN:  1. Epigastric pain  EGD 1/23/2020 stomach with erythema and a few erosions in the gastric antrum. Biopsies with duodenal mucosa with foveolar metaplasia consistent with peptic duodenitis, gastric biopsy with mild chronic gastritis features of reactive gastropathy and no H. Pylori. Symptoms may be due to GERD/dyspepsia. Overall symptoms improved on PPI, should continue with 40 mg daily. Given lifestyle modifications as outlined below. Can also add pepcid 20 mg BID as zantac previously controlled symptoms but is no longer available.   -- continue omeprazole 40 mg a day  -- add Pepcid (famotidine) 20 mg twice daily   GERD Lifestyle Modifications  -- Avoid foods high in fat or high fructose corn syrup  -- do not eat within three hours of laying down or going to bed  -- raise the head of your bed 6-8 inches  -- avoid alcohol  -- aim for BMI of less than <25 and lose extra weight as needed    2. Variable stool consistency, blood with bowel movements   Per patient has had unremarkable colonoscopy, though reports increased blood in stool recently. Will evaluate for inflammation with fecal calprotectin. Suspect symptoms are due to IBS with hemorrhoidal bleeding. Would recommend re-starting fiber supplementation and slowly increasing. Should track bowel movements. Also discussed lifestyle modifications to avoid aerophagia. May benefit from meeting with Lexy Banegas.   -- re-start metamucil. 1 tablet, then increase every 2 weeks by one  tablet. (maximum about 8 a day)  -- track bowel movements on a blank calender   -- can meet with our dietitian Lexy Banegas     3. LUQ abdominal pain  May also have component of abdominal wall pain. Discussed that he could try heating pads or lidocaine patches.     Thank you for this consultation.  It was a pleasure to participate in the care of this patient; please contact us with any further questions. I spent a total of 25+ minutes face-to-face with Kirby Patel during today's office visit.  Over 50% of which was counseling/coordinating care regarding the above delineated issues. Please see note for details.     Ivana Muñoz PA-C  Division of Gastroenterology, Hepatology & Nutrition  [facility]    HPI:  Mr. Patel is a 33 year old male with anxiety and depression who is referred from [facility] for follow-up for abdominal pain. He has previously been seen by Dr. Ramsey and this is my first encounter with the patient.     Patient has some arthritis GI symptoms, previously evaluated by gastroenterologist in [place].  Was diagnosed with irritable bowel syndrome and GERD.  Recommended dietary changes, omeprazole and patient did not remember outcomes those interventions.  Has also been seen through [place] gastro[facility]ology between 2015 and 2016 at which time he had EGD and colonoscopy for blood in the stools-Per patient evaluation was normal and we have asked to obtain records but do not yet have them.    Mr. Patel has had variable stool pattern with loose stools, tenesmus, no nocturnal stooling or fecal incontinence.  Also reports occasional constipation which improves with milk of magnesia.    Symptoms again became more bothersome in 1/20/2019 at which time he was in [place].  Symptoms included fever, vomiting then symptoms switch to constipation.  Per patient stool studies were inconclusive.  In [place], patient described sharp pain and tightness on the left upper quadrant  "toward his back.The pain was occurring 3-5 times a day sporadically. It would last 30-45 minutes. An EKG in primary care clinic was reportedly normal.  At that visit he was prescribed omeprazole/Prilosec 20 mg BID and carafate and he states he made some dietary changes.  Has also been evaluated for this in the emergency department.Made significant dietary changes such as cutting out spicy foods, fried foods and acidic foods.  Continued on twice daily omeprazole and Carafate which resulted in more formed stools.    The patient underwent EGD 1/23/2020 with normal esophagus, stomach with erythema and a dfew erosions in the gastric antrum. Remainder normal. Normal duodenum.  Biopsies with duodenal mucosa with foveolar metaplasia consistent with peptic duodenitis, gastric biopsy with mild chronic gastritis features of reactive gastropathy and no H. pylori.    Interval history 2/3/2020:  Bowel movements and bloating:   Metamucil- took this and went to the bathroom 8 times . [place] scale. Bowel movements change all the time- can go from solid to liquid. Will have diarrhea, and constipation. Will get bloated with hunger, in the morning, after eating a meal. Overall stools are very loose. Feels like he is \"constantly burping\"- all the time. Any foods can cause these symptoms, even with home cooked meals. Usually not skipping days, will have to strain with bowel movements- only gas. Bloating is there 3-4 times a week. Will have urgent bowel movements. In December had episode of blood coming out- will be mixed into the stool.     GERD/Dyspepsia:   Has increased water intake- has helped his symptoms overall. Will get indigestion- taking 20 mg of omeprazole BID. Will feel nausea, \"sour stomach\", gas is smelly. Was taking a lot of ranitidine. Will have to take additional tums (once every 4 days), rickie, pepto bismol- once every two weeks. Will sometimes feel like he has to vomit (once a month or every 2 months and will be 3-4 " times)- will be bile, sometimes food. GERD has improved with increased water, pillows, prop up his back. Does not feel like he needs to be full to be satisfied.     Gets a lot of migraines- seem to be connected to stomach. Coffee can be a trigger but he does better with black coffee.     Left sided abdominal pain- seems to improve with passing gas. This is a twisting pain- happens almost every day.     Cheese will cause constipation and gas. Avoids spicy foods.    No ibuprofen or advil       ROS:    + chest pain related to gas pain  + shortness of breath  + headaches- Excedrin every 3-4 days   + weight- has gained weight  + anxiety- worsened with wellbutrin       PERTINENT PAST MEDICAL HISTORY:  Past Medical History:   Diagnosis Date     Anxiety      Depressive disorder      Esophageal reflux      Stomach problems    Depression    PREVIOUS SURGERIES:  None      ALLERGIES:   Allergies   Allergen Reactions     Codeine Anaphylaxis     Hydromorphone Nausea and Vomiting     Zofran DOES NOT HELP, only compounds problem, PER PT.        PERTINENT MEDICATIONS:  Current Outpatient Medications   Medication     BUPROPION HCL PO     diphenhydrAMINE (BENADRYL) 25 MG tablet     Escitalopram Oxalate (LEXAPRO PO)     LORazepam (ATIVAN) 0.5 MG tablet     omeprazole (PRILOSEC) 40 MG DR capsule     No current facility-administered medications for this visit.        SOCIAL HISTORY:  Social History     Socioeconomic History     Marital status: Single     Spouse name: Not on file     Number of children: Not on file     Years of education: Not on file     Highest education level: Not on file   Occupational History     Not on file   Social Needs     Financial resource strain: Not on file     Food insecurity:     Worry: Not on file     Inability: Not on file     Transportation needs:     Medical: Not on file     Non-medical: Not on file   Tobacco Use     Smoking status: Never Smoker     Smokeless tobacco: Never Used   Substance and Sexual  "Activity     Alcohol use: Yes     Drug use: No     Sexual activity: Yes     Partners: Male   Lifestyle     Physical activity:     Days per week: Not on file     Minutes per session: Not on file     Stress: Not on file   Relationships     Social connections:     Talks on phone: Not on file     Gets together: Not on file     Attends Judaism service: Not on file     Active member of club or organization: Not on file     Attends meetings of clubs or organizations: Not on file     Relationship status: Not on file     Intimate partner violence:     Fear of current or ex partner: Not on file     Emotionally abused: Not on file     Physically abused: Not on file     Forced sexual activity: Not on file   Other Topics Concern     Not on file   Social History Narrative     Not on file       FAMILY HISTORY:  Mother: obesity, HL, borderline DM  Father: unknown    PHYSICAL EXAMINATION:  Vitals /81   Pulse 72   Temp 98.2  F (36.8  C) (Oral)   Ht 1.626 m (5' 4\")   Wt 86.3 kg (190 lb 3.2 oz)   SpO2 96%   BMI 32.65 kg/m      Wt   Wt Readings from Last 2 Encounters:   02/03/20 86.3 kg (190 lb 3.2 oz)   05/12/19 81.6 kg (180 lb)   Gen: Pt sitting up in NAD, interactive and cooperative on exam  Eyes: sclerae anicteric, no injection  ENT:  OP clear, MMM  Cardiac: no peripheral edema  Resp: no respiratory distress   GI: abd soft, flat, tender to LUQ, + Carnett's sign   Skin: Warm, dry, no jaundice, nails appear healthy  Lymph: no submandibular, no cervical, and no supraclavicular LAD  Neuro: alert, oriented, answers questions appropriately    PERTINENT STUDIES:  [facility] Appointment on 07/20/2017   Component Date Value Ref Range Status     Radiologist flags 07/20/2017 Palpable cervical lymph node   Final       Again, thank you for allowing me to participate in the care of your patient.      Sincerely,    Ivana Muñoz PA-C      "

## 2020-02-03 NOTE — PATIENT INSTRUCTIONS
It was a pleasure taking care of you today.  I've included a brief summary of our discussion and care plan from today's visit below.  Please review this information with your primary care provider.  ______________________________________________________________________    My recommendations are summarized as follows:    -- continue omeprazole 40 mg a day    -- add Pepcid (famotidine) 20 mg twice daily     GERD Lifestyle Modifications  -- Avoid foods high in fat or high fructose corn syrup  -- do not eat within three hours of laying down or going to bed  -- raise the head of your bed 6-8 inches  -- avoid alcohol  -- aim for BMI of less than <25 and lose extra weight as needed    -- re-start metamucil. 1 tablet, then increase every 2 weeks by one tablet. (maximum about 8 a day)    -- track bowel movements on a blank calender     -- can meet with our dietitian Lexy Baneags     Return to GI Clinic in 6-8 weeks to review your progress.    ______________________________________________________________________    Who do I call with any questions after my visit?  Please be in touch if there are any further questions that arise following today's visit.  There are multiple ways to contact your gastroenterology care team.        During business hours, you may reach a Gastroenterology nurse at [phone]      To schedule or reschedule an appointment, please call [phone].       You can always send a secure message through Invoiceable.  Invoiceable messages are answered by your nurse or doctor typically within 24 hours.  Please allow extra time on weekends and holidays.        For urgent/emergent questions after business hours, you may reach the on-call GI Fellow by contacting the [facility] at [phone].     How will I get the results of any tests ordered?    You will receive all of your results.  If you have signed up for MyChart, any tests ordered at your visit will be available to you after your  physician reviews them.  Typically this takes 1-2 weeks.  If there are urgent results that require a change in your care plan, your physician or nurse will call you to discuss the next steps.      What is [employer]?  MerryMarry is a secure way for you to access all of your healthcare records from the [facility].  It is a web based computer program, so you can sign on to it from any location.  It also allows you to send secure messages to your care team.  I recommend signing up for MerryMarry access if you have not already done so and are comfortable with using a computer.      How to I schedule a follow-up visit?  If you did not schedule a follow-up visit today, please call [phone] to schedule a follow-up office visit.        Sincerely,    Ivana Muñoz PA-C  Division of Gastroenterology, Hepatology & Nutrition  [facility]

## 2020-02-03 NOTE — NURSING NOTE
"Chief Complaint   Patient presents with     RECHECK     EGD follow up       Vitals:    02/03/20 1333   BP: 124/81   Pulse: 72   Temp: 98.2  F (36.8  C)   TempSrc: Oral   SpO2: 96%   Weight: 86.3 kg (190 lb 3.2 oz)   Height: 1.626 m (5' 4\")       Body mass index is 32.65 kg/m .    Glo Richter CMA    "

## 2020-03-10 ENCOUNTER — HEALTH MAINTENANCE LETTER (OUTPATIENT)
Age: 35
End: 2020-03-10

## 2020-03-15 NOTE — PROGRESS NOTES
GI TELEPHONE CLINIC VISIT    CC/REFERRING MD:  Referred Self    Patient's visit was conducted via telephone visit vs. an in person visit due to Covid-19 pandemic. Patient agrees to telephone encounter today.     I spoke with Kirby Patel over the phone today    REASON FOR CONSULTATION: follow-up GI symptoms     PHONE CALL CONTACT TIME: 3:07 - 3:27    ASSESSMENT/PLAN:  1. Epigastric pain  EGD 1/23/2020 stomach with erythema and a few erosions in the gastric antrum. Biopsies with duodenal mucosa with foveolar metaplasia consistent with peptic duodenitis, gastric biopsy with mild chronic gastritis features of reactive gastropathy and no H. Pylori. Symptoms may be due to GERD/dyspepsia. Overall symptoms improved on PPI, should continue with 40 mg daily and can continue famotidine as needed at this time.  Should continue to follow lifestyle modifications and avoid food triggers for GERD/dyspepsia.  Future considerations include meeting with our dietitian.  Her component of epigastric pain which shoots to the chest, would recommend discussing with primary care.    -- continue omeprazole 40 mg a day  -- continue Pepcid (famotidine) 20 mg twice daily     2. Variable stool consistency, blood with bowel movements   Per patient has had unremarkable colonoscopy, though had blood in his stool earlier this year.  This has not resolved and overall bowel movements are significantly improved.  Suspect this is due to to irritable bowel syndrome and hemorrhoidal bleeding.  If blood returns, could consider fecal calprotectin to evaluate for inflammation versus repeat colonoscopy.  In the meantime, should continue fiber and can slowly increase the dose until he feels he is having satisfactory bowel movements.  Can also try as needed Bentyl for periods of worsened abdominal pain/discomfort.   --Can slowly increase fiber  --Use as needed Bentyl    Ivana Muñoz PA-C  Division of Gastroenterology, Hepatology &  Nutrition  [facility]    HPI:  Mr. Patel is a 33 year old male with anxiety and depression who is referred from [facility] for follow-up for abdominal pain. He has previously been seen by Dr. Ramsey and this is my first encounter with the patient.     Patient has some arthritis GI symptoms, previously evaluated by gastroenterologist in [place].  Was diagnosed with irritable bowel syndrome and GERD.  Recommended dietary changes, omeprazole and patient did not remember outcomes those interventions.  Has also been seen through [place] gastro[facility]ology between 2015 and 2016 at which time he had EGD and colonoscopy for blood in the stools-Per patient evaluation was normal and we have asked to obtain records but do not yet have them.    Mr. Patel has had variable stool pattern with loose stools, tenesmus, no nocturnal stooling or fecal incontinence.  Also reports occasional constipation which improves with milk of magnesia.    Symptoms again became more bothersome in 1/20/2019 at which time he was in [place].  Symptoms included fever, vomiting then symptoms switch to constipation.  Per patient stool studies were inconclusive.  In [place], patient described sharp pain and tightness on the left upper quadrant toward his back.The pain was occurring 3-5 times a day sporadically. It would last 30-45 minutes. An EKG in primary care clinic was reportedly normal.  At that visit he was prescribed omeprazole/Prilosec 20 mg BID and carafate and he states he made some dietary changes.  Has also been evaluated for this in the emergency department.Made significant dietary changes such as cutting out spicy foods, fried foods and acidic foods.  Continued on twice daily omeprazole and Carafate which resulted in more formed stools.    The patient underwent EGD 1/23/2020 with normal esophagus, stomach with erythema and a dfew erosions in the gastric antrum. Remainder normal. Normal duodenum.  Biopsies with  duodenal mucosa with foveolar metaplasia consistent with peptic duodenitis, gastric biopsy with mild chronic gastritis features of reactive gastropathy and no H. pylori.    Interval history 3/16/2020:   Any lactose, tomatoes can cause symptoms. Symptoms have improved but if they come they wont last a long time      Fiber-- 1 a day   Diarrhea has improved  Blood with bowel movements has stopped.     Caffeine will seem to trigger bloating which will hit a nerve and radiate up. Before this was happening daily, now once every 2-3 weeks. If this happens it might be 2-3 days. It will come and go for a few hours.     Has only had bloating a couple of times since last visit with food trigger     Omeprazole- has been helpful, is taking famitidine (twice a day), tums once a day. It is improved- is wondering if this is related to his back, IS going away     ROS: Came down with the flu, sinus infection. + sore throat. No cough or chest pain. On antibiotics. No fever or chills, no chest pain or SOB. Headaches has improved (Has been on zyrtect every day)  + anxiety- has been a little worsened with pandemic and change in work environment     PERTINENT PAST MEDICAL HISTORY:  Past Medical History:   Diagnosis Date     Anxiety      Depressive disorder      Esophageal reflux      Stomach problems    Depression    PREVIOUS SURGERIES:  None      ALLERGIES:   Allergies   Allergen Reactions     Codeine Anaphylaxis     Hydromorphone Nausea and Vomiting     Zofran DOES NOT HELP, only compounds problem, PER PT.        PERTINENT MEDICATIONS:  Current Outpatient Medications   Medication     BUPROPION HCL PO     diphenhydrAMINE (BENADRYL) 25 MG tablet     Escitalopram Oxalate (LEXAPRO PO)     famotidine (PEPCID) 20 MG tablet     LORazepam (ATIVAN) 0.5 MG tablet     omeprazole (PRILOSEC) 40 MG DR capsule     No current facility-administered medications for this visit.        SOCIAL HISTORY:  Social History     Socioeconomic History     Marital  status: Single     Spouse name: Not on file     Number of children: Not on file     Years of education: Not on file     Highest education level: Not on file   Occupational History     Not on file   Social Needs     Financial resource strain: Not on file     Food insecurity     Worry: Not on file     Inability: Not on file     Transportation needs     Medical: Not on file     Non-medical: Not on file   Tobacco Use     Smoking status: Never Smoker     Smokeless tobacco: Never Used   Substance and Sexual Activity     Alcohol use: Yes     Drug use: No     Sexual activity: Yes     Partners: Male   Lifestyle     Physical activity     Days per week: Not on file     Minutes per session: Not on file     Stress: Not on file   Relationships     Social connections     Talks on phone: Not on file     Gets together: Not on file     Attends Islam service: Not on file     Active member of club or organization: Not on file     Attends meetings of clubs or organizations: Not on file     Relationship status: Not on file     Intimate partner violence     Fear of current or ex partner: Not on file     Emotionally abused: Not on file     Physically abused: Not on file     Forced sexual activity: Not on file   Other Topics Concern     Not on file   Social History Narrative     Not on file       FAMILY HISTORY:  Mother: obesity, HL, borderline DM  Father: unknown    PERTINENT STUDIES:  [facility] Appointment on 07/20/2017   Component Date Value Ref Range Status     Radiologist flags 07/20/2017 Palpable cervical lymph node   Final

## 2020-03-16 ENCOUNTER — VIRTUAL VISIT (OUTPATIENT)
Dept: GASTROENTEROLOGY | Facility: CLINIC | Age: 35
End: 2020-03-16
Payer: COMMERCIAL

## 2020-03-16 DIAGNOSIS — R10.84 ABDOMINAL PAIN, GENERALIZED: Primary | ICD-10-CM

## 2020-03-16 DIAGNOSIS — K29.50 CHRONIC GASTRITIS WITHOUT BLEEDING, UNSPECIFIED GASTRITIS TYPE: ICD-10-CM

## 2020-03-16 DIAGNOSIS — R19.8 IRREGULAR BOWEL HABITS: ICD-10-CM

## 2020-03-16 RX ORDER — DICYCLOMINE HYDROCHLORIDE 10 MG/1
10 CAPSULE ORAL PRN
Qty: 30 CAPSULE | Refills: 0 | Status: SHIPPED | OUTPATIENT
Start: 2020-03-16 | End: 2020-11-20

## 2020-03-16 NOTE — PATIENT INSTRUCTIONS
It was a pleasure taking care of you today.  I've included a brief summary of our discussion and care plan from today's visit below.  Please review this information with your primary care provider.  ______________________________________________________________________    My recommendations are summarized as follows:    --Can slowly increase fiber    --Use as needed Bentyl for abdominal discomfort     -- continue omeprazole 40 mg a day    -- continue Pepcid (famotidine) 20 mg twice daily     Return to GI Clinic in 2 months to review your progress.    ______________________________________________________________________    Who do I call with any questions after my visit?  Please be in touch if there are any further questions that arise following today's visit.  There are multiple ways to contact your gastroenterology care team.        During business hours, you may reach a Gastroenterology nurse at [phone]      To schedule or reschedule an appointment, please call [phone].       You can always send a secure message through [email or web address].  [email or web address] messages are answered by your nurse or doctor typically within 24 hours.  Please allow extra time on weekends and holidays.        For urgent/emergent questions after business hours, you may reach the on-call GI Fellow by contacting the [facility]  at [phone].     How will I get the results of any tests ordered?    You will receive all of your results.  If you have signed up for [email or web address], any tests ordered at your visit will be available to you after your physician reviews them.  Typically this takes 1-2 weeks.  If there are urgent results that require a change in your care plan, your physician or nurse will call you to discuss the next steps.      What is [email or web address]?  [email or web address] is a secure way for you to access all of your healthcare records from the [facility].  It is a web based computer program, so you can sign on to it from any  location.  It also allows you to send secure messages to your care team.  I recommend signing up for CirroSecure access if you have not already done so and are comfortable with using a computer.      How to I schedule a follow-up visit?  If you did not schedule a follow-up visit today, please call [phone] to schedule a follow-up office visit.        Sincerely,    Ivana Muñoz PA-C  Division of Gastroenterology, Hepatology & Nutrition  [facility]

## 2020-08-05 ENCOUNTER — TRANSFERRED RECORDS (OUTPATIENT)
Dept: HEALTH INFORMATION MANAGEMENT | Facility: CLINIC | Age: 35
End: 2020-08-05

## 2020-08-06 ENCOUNTER — TRANSFERRED RECORDS (OUTPATIENT)
Dept: HEALTH INFORMATION MANAGEMENT | Facility: CLINIC | Age: 35
End: 2020-08-06

## 2020-08-06 ENCOUNTER — MEDICAL CORRESPONDENCE (OUTPATIENT)
Dept: HEALTH INFORMATION MANAGEMENT | Facility: CLINIC | Age: 35
End: 2020-08-06

## 2020-08-11 ENCOUNTER — TRANSCRIBE ORDERS (OUTPATIENT)
Dept: OTHER | Age: 35
End: 2020-08-11

## 2020-08-11 DIAGNOSIS — R20.2 NUMBNESS AND TINGLING IN BOTH HANDS: Primary | ICD-10-CM

## 2020-08-11 DIAGNOSIS — R00.2 PALPITATIONS: Primary | ICD-10-CM

## 2020-08-11 DIAGNOSIS — R20.0 NUMBNESS AND TINGLING IN BOTH HANDS: Primary | ICD-10-CM

## 2020-08-18 NOTE — TELEPHONE ENCOUNTER
RECORDS RECEIVED FROM: External   DATE RECEIVED: 9-9-20   NOTES STATUS DETAILS   OFFICE NOTE from referring provider    Received Referral/office note from Kathrine 8-6-20 scanned into [facility]   OFFICE NOTE from other cardiologist    N/A    DISCHARGE SUMMARY from hospital    N/A    DISCHARGE REPORT from the ER   N/A    OPERATIVE REPORT    N/A    MEDICATION LIST   Internal    LABS     BMP   N/A    CBC   Received 8-5-20   CMP   Received 8-5-20   Lipids   N/A    TSH   Received 8-6-20   DIAGNOSTIC PROCEDURES     EKG   N/A    Monitor Reports   N/A    IMAGING (DISC & REPORT)      Echo   N/A    Stress Tests   N/A    Cath   N/A    MRI/MRA   N/A    CT/CTA   N/A

## 2020-08-25 ENCOUNTER — PRE VISIT (OUTPATIENT)
Dept: NEUROLOGY | Facility: CLINIC | Age: 35
End: 2020-08-25

## 2020-08-25 NOTE — TELEPHONE ENCOUNTER
FUTURE VISIT INFORMATION      FUTURE VISIT INFORMATION:    Date: 8/31/2020    Time: 915am    Location: [facility]  REFERRAL INFORMATION:    Referring provider:  Rico Burciaga PA-C     Referring providers clinic:  [facility]     Reason for visit/diagnosis  Numbness     RECORDS REQUESTED FROM:       Clinic name Comments Records Status Imaging Status   [facility]   Scanned to Media  N/A          Internal US Head 7/20/2017    CT Head 3/23/2016 [facility] PACS

## 2020-08-31 ENCOUNTER — OFFICE VISIT (OUTPATIENT)
Dept: NEUROLOGY | Facility: CLINIC | Age: 35
End: 2020-08-31
Payer: COMMERCIAL

## 2020-08-31 VITALS
DIASTOLIC BLOOD PRESSURE: 80 MMHG | OXYGEN SATURATION: 94 % | SYSTOLIC BLOOD PRESSURE: 117 MMHG | RESPIRATION RATE: 16 BRPM | HEART RATE: 80 BPM

## 2020-08-31 DIAGNOSIS — R20.0 NUMBNESS OF LEFT FOOT: ICD-10-CM

## 2020-08-31 DIAGNOSIS — R20.0 BILATERAL HAND NUMBNESS: Primary | ICD-10-CM

## 2020-08-31 ASSESSMENT — PAIN SCALES - GENERAL: PAINLEVEL: NO PAIN (0)

## 2020-08-31 NOTE — LETTER
2020       RE: Kirby Patel  [address]  [facility] [address]     Dear Colleague,    Thank you for referring your patient, Kirby Patel, to the [facility] NEUROLOGY at [facility]. Please see a copy of my visit note below.    Service Date: 2020      Rico Burciaga PA-C   [facility]   [address] [place]  [address]      RE: Kirby Patel   MRN: [id number]   : 1985      Dear Rico Burciaga:      I saw your patient, Kirby Patel, for neurologic evaluation on 2020.  He is a 35-year-old male here for evaluation of intermittent bilateral hand paresthesias as well as a localized left foot numbness.      He has noted tingling in his hands for the past year.  It is intermittent.  He believes it is all 5 fingers.  He will notice it with typing.  He will awaken at night with the hands feeling tingling and numb.  He reports pain in his left wrist.      A couple of months ago, he was taking a shower and noted that he had numbness involving the left foot.  This has remained quite localized.  It is approximately the distal third of the medial foot extending towards the great toe and up on the dorsum of the foot but not on the plantar surface.  It is not particularly painful.  He has some back pain but no radicular pain in the leg.      He did have a number of laboratory studies done.  His fasting blood sugar was 95.  Hemoglobin A1c normal at 4.9.  He had a normal TSH, B12 and CBC.  Comprehensive metabolic panel was notable only for an ALT of 107.      PAST MEDICAL HISTORY:  Notable for asthma and anxiety.      CURRENT MEDICATIONS:  Bentyl, Lexapro, Pepcid, Ativan p.r.n. and omeprazole.      ALLERGIES:  Codeine and hydromorphone.      FAMILY HISTORY:  Positive for diabetes.      SOCIAL HISTORY:  He is a  studying cultural studies.  He does not smoke or use alcohol.      PHYSICAL EXAMINATION:  Examination  reveals his heart rate is 80.  Blood pressure 117/80.  Pupils are equal, round and react well to light.  He has full extraocular motility, and otherwise, cranial nerves II-XII are intact.  Motor examination reveals normal upper and lower extremity strength.  He is able to heel and toe walk without difficulty.  Sensory examination of the upper extremities is normal.  In the lower extremities, he reports decreased pin and touch involving the medial aspect of the distal third of the foot extending slightly up onto the dorsum of the foot but not the plantar aspect down towards and onto the dorsal great toe.  Position and vibratory sense are intact.  Cerebellar function is normal.  Gait is normal.  Reflexes are 2+.  Plantar responses are flexor.      Tinel sign is negative at the wrist.  Phalen maneuver does produce paresthesias into the left hand.      IMPRESSION:   1.  Intermittent bilateral hand paresthesias, suspicious for carpal tunnel syndrome.   2.  Localized left foot numbness of undetermined etiology.  Consideration could be given to a partial superficial peroneal neuropathy or possibly an L4 radiculopathy.      PLAN:  I did discuss the above with the patient.      For further evaluation, he is going to have bilateral upper and left lower extremity EMG and nerve conduction studies done.      I will contact him with the results when available.        Sincerely,         MICHELLE SEGURA MD             D: 2020   T: 2020   MT: kina      Name:     NYA POWELL   MRN:      [id number]        Account:      [id number]   :      1985           Service Date: 2020      Document: [id number]

## 2020-08-31 NOTE — PROGRESS NOTES
Service Date: 2020      Rico Burciaga PA-C   [address]  [address]      RE: Kirby Patel   MRN: [id number]   : 1985      Dear Rico Burciaga:      I saw your patient, Kirby Patel, for neurologic evaluation on 2020.  He is a 35-year-old male here for evaluation of intermittent bilateral hand paresthesias as well as a localized left foot numbness.      He has noted tingling in his hands for the past year.  It is intermittent.  He believes it is all 5 fingers.  He will notice it with typing.  He will awaken at night with the hands feeling tingling and numb.  He reports pain in his left wrist.      A couple of months ago, he was taking a shower and noted that he had numbness involving the left foot.  This has remained quite localized.  It is approximately the distal third of the medial foot extending towards the great toe and up on the dorsum of the foot but not on the plantar surface.  It is not particularly painful.  He has some back pain but no radicular pain in the leg.      He did have a number of laboratory studies done.  His fasting blood sugar was 95.  Hemoglobin A1c normal at 4.9.  He had a normal TSH, B12 and CBC.  Comprehensive metabolic panel was notable only for an ALT of 107.      PAST MEDICAL HISTORY:  Notable for asthma and anxiety.      CURRENT MEDICATIONS:  Bentyl, Lexapro, Pepcid, Ativan p.r.n. and omeprazole.      ALLERGIES:  Codeine and hydromorphone.      FAMILY HISTORY:  Positive for diabetes.      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  He does not smoke or use alcohol.      PHYSICAL EXAMINATION:  Examination reveals his heart rate is 80.  Blood pressure 117/80.  Pupils are equal, round and react well to light.  He has full extraocular motility, and otherwise, cranial nerves II-XII are intact.  Motor examination reveals normal upper and lower extremity strength.  He is able to heel and toe walk  without difficulty.  Sensory examination of the upper extremities is normal.  In the lower extremities, he reports decreased pin and touch involving the medial aspect of the distal third of the foot extending slightly up onto the dorsum of the foot but not the plantar aspect down towards and onto the dorsal great toe.  Position and vibratory sense are intact.  Cerebellar function is normal.  Gait is normal.  Reflexes are 2+.  Plantar responses are flexor.      Tinel sign is negative at the wrist.  Phalen maneuver does produce paresthesias into the left hand.      IMPRESSION:   1.  Intermittent bilateral hand paresthesias, suspicious for carpal tunnel syndrome.   2.  Localized left foot numbness of undetermined etiology.  Consideration could be given to a partial superficial peroneal neuropathy or possibly an L4 radiculopathy.      PLAN:  I did discuss the above with the patient.      For further evaluation, he is going to have bilateral upper and left lower extremity EMG and nerve conduction studies done.      I will contact him with the results when available.        Sincerely,         MICHELLE SEGURA MD             D: 2020   T: 2020   MT: kina      Name:     NYA POWELL   MRN:      [id number]        Account:      [id number]   :      1985           Service Date: 2020      Document: [id number]

## 2020-09-09 ENCOUNTER — PRE VISIT (OUTPATIENT)
Dept: CARDIOLOGY | Facility: CLINIC | Age: 35
End: 2020-09-09

## 2020-11-19 NOTE — PROGRESS NOTES
GI CLINIC VISIT    CC/REFERRING MD:  Referred Self    ASSESSMENT/PLAN:  1. Epigastric pain  EGD 1/23/2020 stomach with erythema and a few erosions in the gastric antrum. Biopsies with duodenal mucosa with foveolar metaplasia consistent with peptic duodenitis, gastric biopsy with mild chronic gastritis features of reactive gastropathy and no H. Pylori. Symptoms may be due to GERD/dyspepsia. Overall symptoms improved on PPI though symptoms have recently be worsened, should increase to 40 mg BID and then reduce back to once daily. Can continue famotidine as needed at this time.  Should continue to follow lifestyle modifications and avoid food triggers for GERD/dyspepsia.  Future considerations include meeting with our dietitian.   -- continue omeprazole- increase to 40 mg a day for two weeks and then decrease back to once daily  -- continue Pepcid (famotidine) 20 mg twice daily     2. Variable stool consistency, blood with bowel movements   Per patient has had unremarkable colonoscopy around 2016, will obtain records. Has had hemrhoidal bleeding before, states current bleeding seems different and is associated with cramping. Will plan to evaluate with colonoscopy for IBD, microscopic colitis, and source of bleeding.  Suspect this is due to to irritable bowel syndrome - would recommend trial of rifaximin (may also help with bloating discomfort/pain).  In the meantime, should continue fiber and can slowly increase the dose until he feels he is having satisfactory bowel movements.  Can also try as needed Bentyl for periods of worsened abdominal pain/discomfort.   --Can slowly increase fiber- a full dose is 4 pills once a day  --Use as needed Bentyl  -- rifaximin 550 mg three times a day  -- colonoscopy     Ivana Muñoz PA-C  Division of Gastroenterology, Hepatology & Nutrition  [facility]    I spent a total of 37 minutes face-to-face (video) with Kirby Patel during today's office visit.  Over 50%  of which was counseling/coordinating care regarding the above delineated issues. Please see note for details.       HPI:  Mr. Patel is a 33 year old male with anxiety and depression who is referred from [facility] for follow-up for abdominal pain. He has previously been seen by Dr. Ramsey and this is my first encounter with the patient.     Patient has some arthritis GI symptoms, previously evaluated by gastroenterologist in [place].  Was diagnosed with irritable bowel syndrome and GERD.  Recommended dietary changes, omeprazole and patient did not remember outcomes those interventions.  Has also been seen through [place] [facility] between 2015 and 2016 at which time he had EGD and colonoscopy for blood in the stools-Per patient evaluation was normal and we have asked to obtain records but do not yet have them.    Mr. Patel has had variable stool pattern with loose stools, tenesmus, no nocturnal stooling or fecal incontinence.  Also reports occasional constipation which improves with milk of magnesia.    Symptoms again became more bothersome in 1/20/2019 at which time he was in [place].  Symptoms included fever, vomiting then symptoms switch to constipation.  Per patient stool studies were inconclusive.  In [place], patient described sharp pain and tightness on the left upper quadrant toward his back.The pain was occurring 3-5 times a day sporadically. It would last 30-45 minutes. An EKG in primary care clinic was reportedly normal.  At that visit he was prescribed omeprazole/Prilosec 20 mg BID and carafate and he states he made some dietary changes.  Has also been evaluated for this in the emergency department.Made significant dietary changes such as cutting out spicy foods, fried foods and acidic foods.  Continued on twice daily omeprazole and Carafate which resulted in more formed stools.    The patient underwent EGD 1/23/2020 with normal esophagus, stomach with erythema and a dfew  "erosions in the gastric antrum. Remainder normal. Normal duodenum.  Biopsies with duodenal mucosa with foveolar metaplasia consistent with peptic duodenitis, gastric biopsy with mild chronic gastritis features of reactive gastropathy and no H. pylori.    Interval history 3/16/2020:   Any lactose, tomatoes can cause symptoms. Symptoms have improved but if they come they wont last a long time      Fiber-- 1 a day   Diarrhea has improved  Blood with bowel movements has stopped.     Caffeine will seem to trigger bloating which will hit a nerve and radiate up. Before this was happening daily, now once every 2-3 weeks. If this happens it might be 2-3 days. It will come and go for a few hours.     Has only had bloating a couple of times since last visit with food trigger     Omeprazole- has been helpful, is taking famitidine (twice a day), tums once a day. It is improved- is wondering if this is related to his back, IS going away     ROS: Came down with the flu, sinus infection. + sore throat. No cough or chest pain. On antibiotics. No fever or chills, no chest pain or SOB. Headaches has improved (Has been on zyrtect every day)  + anxiety- has been a little worsened with pandemic and change in work environment     Interval History 11/20/2020:   Symptoms seemed to worsen in the last three weeks especially (in the last month).     Feels sensation of \"pinched nerve\" in upper abdomen. Bentyl helps this symptom. Has been cooking at home more, healthier meals, weight loss (intentional), increased water.     Is taking pepto bismol, famotidine, omeprazole. Will have worsening symptoms if he has coffee in the afternoon. Rice from a chinese restaurant will lead to symptoms.    Stool pattern: 4 times a day, can vary from normal, diarrhea, constipation. About a month ago had severe constipation for 2-3 days. Took magnesium citrate. Had a bowel movement had a lot of blood, there was blood in the water. Has installed a biday. Is taking " 1 fiber pill a day. Can be hard or loose. Will sometimes strain with a bowel movement. Discomfort will improve when he does have a bowel movement. Recent bleeding does not feel like hemorrhoidal pain because he had cramping. Will wake up in the middle of the night with a bowel movement.     Distended belly, passing more gas, burping more. Belly will bloated, distended, will feel hard and will protrude.      Stopped iron pills     Will sometimes feel strange taste in mouth. No vomiting.     ROS:  Migraine- took a headache medication     PERTINENT PAST MEDICAL HISTORY:  Past Medical History:   Diagnosis Date     Anxiety      Depressive disorder      Esophageal reflux      Stomach problems    Depression    PREVIOUS SURGERIES:  None      ALLERGIES:   Allergies   Allergen Reactions     Codeine Anaphylaxis     Hydromorphone Nausea and Vomiting     Zofran DOES NOT HELP, only compounds problem, PER PT.        PERTINENT MEDICATIONS:  Current Outpatient Medications   Medication     dicyclomine (BENTYL) 10 MG capsule     Escitalopram Oxalate (LEXAPRO PO)     famotidine (PEPCID) 20 MG tablet     LORazepam (ATIVAN) 0.5 MG tablet     omeprazole (PRILOSEC) 40 MG DR capsule     No current facility-administered medications for this visit.        SOCIAL HISTORY:  Teaching, alcohol worsen symptoms   Social History     Socioeconomic History     Marital status: Single     Spouse name: Not on file     Number of children: Not on file     Years of education: Not on file     Highest education level: Not on file   Occupational History     Not on file   Social Needs     Financial resource strain: Not on file     Food insecurity     Worry: Not on file     Inability: Not on file     Transportation needs     Medical: Not on file     Non-medical: Not on file   Tobacco Use     Smoking status: Never Smoker     Smokeless tobacco: Never Used   Substance and Sexual Activity     Alcohol use: Yes     Drug use: No     Sexual activity: Yes     Partners:  Male   Lifestyle     Physical activity     Days per week: Not on file     Minutes per session: Not on file     Stress: Not on file   Relationships     Social connections     Talks on phone: Not on file     Gets together: Not on file     Attends L[employer] day service: Not on file     Active member of club or organization: Not on file     Attends meetings of clubs or organizations: Not on file     Relationship status: Not on file     Intimate partner violence     Fear of current or ex partner: Not on file     Emotionally abused: Not on file     Physically abused: Not on file     Forced sexual activity: Not on file   Other Topics Concern     Not on file   Social History Narrative     Not on file       FAMILY HISTORY:  Mother: obesity, HL, borderline DM  Father: unknown    PERTINENT STUDIES:  [facility] Appointment on 07/20/2017   Component Date Value Ref Range Status     Radiologist flags 07/20/2017 Palpable cervical lymph node   Final

## 2020-11-20 ENCOUNTER — VIRTUAL VISIT (OUTPATIENT)
Dept: GASTROENTEROLOGY | Facility: CLINIC | Age: 35
End: 2020-11-20
Payer: COMMERCIAL

## 2020-11-20 VITALS — WEIGHT: 200 LBS | HEIGHT: 64 IN | BODY MASS INDEX: 34.15 KG/M2

## 2020-11-20 DIAGNOSIS — K58.0 IRRITABLE BOWEL SYNDROME WITH DIARRHEA: Primary | ICD-10-CM

## 2020-11-20 DIAGNOSIS — K62.5 BRIGHT RED BLOOD PER RECTUM: ICD-10-CM

## 2020-11-20 DIAGNOSIS — K21.9 GASTROESOPHAGEAL REFLUX DISEASE WITHOUT ESOPHAGITIS: ICD-10-CM

## 2020-11-20 DIAGNOSIS — R10.84 ABDOMINAL PAIN, GENERALIZED: ICD-10-CM

## 2020-11-20 PROCEDURE — 99214 OFFICE O/P EST MOD 30 MIN: CPT | Mod: 95 | Performed by: PHYSICIAN ASSISTANT

## 2020-11-20 RX ORDER — OMEPRAZOLE 40 MG/1
CAPSULE, DELAYED RELEASE ORAL
Qty: 90 CAPSULE | Refills: 3 | Status: SHIPPED | OUTPATIENT
Start: 2020-11-20 | End: 2021-05-06

## 2020-11-20 RX ORDER — DICYCLOMINE HYDROCHLORIDE 10 MG/1
10 CAPSULE ORAL PRN
Qty: 60 CAPSULE | Refills: 4 | Status: SHIPPED | OUTPATIENT
Start: 2020-11-20 | End: 2022-08-17

## 2020-11-20 RX ORDER — FAMOTIDINE 20 MG/1
20 TABLET, FILM COATED ORAL 2 TIMES DAILY
Qty: 120 TABLET | Refills: 3 | Status: SHIPPED | OUTPATIENT
Start: 2020-11-20 | End: 2021-05-21

## 2020-11-20 ASSESSMENT — PAIN SCALES - GENERAL: PAINLEVEL: NO PAIN (0)

## 2020-11-20 ASSESSMENT — MIFFLIN-ST. JEOR: SCORE: 1753.19

## 2020-11-20 NOTE — PROGRESS NOTES
"Kirby Patel is a 35 year old male who is being evaluated via a billable video visit.      The patient has been notified of following:     \"This video visit will be conducted via a call between you and your physician/provider. We have found that certain health care needs can be provided without the need for an in-person physical exam.  This service lets us provide the care you need with a video conversation.  If a prescription is necessary we can send it directly to your pharmacy.  If lab work is needed we can place an order for that and you can then stop by our lab to have the test done at a later time.    Video visits are billed at different rates depending on your insurance coverage.  Please reach out to your insurance provider with any questions.    If during the course of the call the physician/provider feels a video visit is not appropriate, you will not be charged for this service.\"    Patient has given verbal consent for Video visit? Yes  How would you like to obtain your AVS? MyChart  If you are dropped from the video visit, the video invite should be resent to: Text to cell phone: [phone]  Will anyone else be joining your video visit? No      Video-Visit Details    Type of service:  Video Visit    Video Start Time: 7:07 am  Video End Time: 7:44 am    Originating Location (pt. Location): Home    Distant Location (provider location):  [facility] GASTROENTEROLOGY [facility]INIC [place]     Platform used for Video Visit: Audi Muñoz PA-C        "

## 2020-11-20 NOTE — PATIENT INSTRUCTIONS
It was a pleasure taking care of you today.  I've included a brief summary of our discussion and care plan from today's visit below.  Please review this information with your primary care provider.  ______________________________________________________________________    My recommendations are summarized as follows:    -- continue omeprazole- increase to 40 mg a day for two weeks and then decrease back to once daily  -- continue Pepcid (famotidine) 20 mg twice daily   --Can slowly increase fiber- a full dose is 4 pills once a day  --Use as needed Bentyl  -- rifaximin 550 mg three times a day  -- colonoscopy     Return to GI Clinic in 2 months to review your progress.    ______________________________________________________________________    How do I schedule labs, imaging studies, or procedures that were ordered in clinic today?   Labs: To schedule lab appointment at the [facility], use my chart or call [phone]. If you have a [place] lab closer to home where you are regularly seen you can give them a call.     Procedures: If a colonoscopy, upper endoscopy, breath test, esophageal manometry, or pH impedence was ordered today, our endoscopy team will call you to schedule this. If you have not heard from our endoscopy team within a week, please call [phone] to schedule.     Imaging Studies: If you were scheduled for a CT scan, X-ray, MRI, ultrasound, HIDA scan or other imaging study, please call [phone] to have this scheduled.     Referral: If a referral to another specialty was ordered, expect a phone call or follow instructions above. If you have not heard from anyone regarding your referral in a week, please call our clinic to check the status.     Who do I call with any questions after my visit?  Please be in touch if there are any further questions that arise following today's visit.  There are multiple ways to contact your gastroenterology care team.        During business  hours, you may reach a Gastroenterology nurse at [phone]      To schedule or reschedule an appointment, please call [phone].       You can always send a secure message through [employer].  [employer] messages are answered by your nurse or doctor typically within 24 hours.  Please allow extra time on weekends and holidays.        For urgent/emergent questions after business hours, you may reach the on-call GI Fellow by contacting the [facility] at [phone].     How will I get the results of any tests ordered?    You will receive all of your results.  If you have signed up for [employer], any tests ordered at your visit will be available to you after your physician reviews them.  Typically this takes 1-2 weeks.  If there are urgent results that require a change in your care plan, your physician or nurse will call you to discuss the next steps.      What is [employer]?  [employer]Ping is a secure way for you to access all of your healthcare records from the [facility].  It is a web based computer program, so you can sign on to it from any location.  It also allows you to send secure messages to your care team.  I recommend signing up for [employer] access if you have not already done so and are comfortable with using a computer.      How to I schedule a follow-up visit?  If you did not schedule a follow-up visit today, please call [phone] to schedule a follow-up office visit.      Sincerely,    Ivana Muñoz PA-C  Division of Gastroenterology, Hepatology & Nutrition  [facility]

## 2020-11-20 NOTE — NURSING NOTE
"Chief Complaint   Patient presents with     Follow Up     follow up       Vitals:    11/20/20 0704   Weight: 90.7 kg (200 lb)   Height: 1.626 m (5' 4\")       Body mass index is 34.33 kg/m .    Glo Gunn CMA    "

## 2020-11-20 NOTE — LETTER
11/20/2020      RE: Kirby Patel  [address]  [facility] [address]      Dear Colleague,    Thank you for referring your patient, Kirby Patel, to the [facility] [place]. Please see a copy of my visit note below.    GI CLINIC VISIT    CC/REFERRING MD:  Referred Self    ASSESSMENT/PLAN:  1. Epigastric pain  EGD 1/23/2020 stomach with erythema and a few erosions in the gastric antrum. Biopsies with duodenal mucosa with foveolar metaplasia consistent with peptic duodenitis, gastric biopsy with mild chronic gastritis features of reactive gastropathy and no H. Pylori. Symptoms may be due to GERD/dyspepsia. Overall symptoms improved on PPI though symptoms have recently be worsened, should increase to 40 mg BID and then reduce back to once daily. Can continue famotidine as needed at this time.  Should continue to follow lifestyle modifications and avoid food triggers for GERD/dyspepsia.  Future considerations include meeting with our dietitian.   -- continue omeprazole- increase to 40 mg a day for two weeks and then decrease back to once daily  -- continue Pepcid (famotidine) 20 mg twice daily     2. Variable stool consistency, blood with bowel movements   Per patient has had unremarkable colonoscopy around 2016, will obtain records. Has had hemrhoidal bleeding before, states current bleeding seems different and is associated with cramping. Will plan to evaluate with colonoscopy for IBD, microscopic colitis, and source of bleeding.  Suspect this is due to to irritable bowel syndrome - would recommend trial of rifaximin (may also help with bloating discomfort/pain).  In the meantime, should continue fiber and can slowly increase the dose until he feels he is having satisfactory bowel movements.  Can also try as needed Bentyl for periods of worsened abdominal pain/discomfort.   --Can slowly increase fiber- a full dose is 4 pills once a day  --Use as needed  Bentyl  -- rifaximin 550 mg three times a day  -- colonoscopy     Ivana Muñoz PA-C  Division of Gastroenterology, Hepatology & Nutrition  [facility]    I spent a total of 37 minutes face-to-face (video) with Kirby Patel during today's office visit.  Over 50% of which was counseling/coordinating care regarding the above delineated issues. Please see note for details.       HPI:  Mr. Patel is a 33 year old male with anxiety and depression who is referred from [facility] for follow-up for abdominal pain. He has previously been seen by Dr. Ramsey and this is my first encounter with the patient.     Patient has some arthritis GI symptoms, previously evaluated by gastroenterologist in [place].  Was diagnosed with irritable bowel syndrome and GERD.  Recommended dietary changes, omeprazole and patient did not remember outcomes those interventions.  Has also been seen through [place] [facility] between 2015 and 2016 at which time he had EGD and colonoscopy for blood in the stools-Per patient evaluation was normal and we have asked to obtain records but do not yet have them.    Mr. Patel has had variable stool pattern with loose stools, tenesmus, no nocturnal stooling or fecal incontinence.  Also reports occasional constipation which improves with milk of magnesia.    Symptoms again became more bothersome in 1/20/2019 at which time he was in [place].  Symptoms included fever, vomiting then symptoms switch to constipation.  Per patient stool studies were inconclusive.  In [place], patient described sharp pain and tightness on the left upper quadrant toward his back.The pain was occurring 3-5 times a day sporadically. It would last 30-45 minutes. An EKG in primary care clinic was reportedly normal.  At that visit he was prescribed omeprazole/Prilosec 20 mg BID and carafate and he states he made some dietary changes.  Has also been evaluated for this in the emergency  "department.Made significant dietary changes such as cutting out spicy foods, fried foods and acidic foods.  Continued on twice daily omeprazole and Carafate which resulted in more formed stools.    The patient underwent EGD 1/23/2020 with normal esophagus, stomach with erythema and a dfew erosions in the gastric antrum. Remainder normal. Normal duodenum.  Biopsies with duodenal mucosa with foveolar metaplasia consistent with peptic duodenitis, gastric biopsy with mild chronic gastritis features of reactive gastropathy and no H. pylori.    Interval history 3/16/2020:   Any lactose, tomatoes can cause symptoms. Symptoms have improved but if they come they wont last a long time      Fiber-- 1 a day   Diarrhea has improved  Blood with bowel movements has stopped.     Caffeine will seem to trigger bloating which will hit a nerve and radiate up. Before this was happening daily, now once every 2-3 weeks. If this happens it might be 2-3 days. It will come and go for a few hours.     Has only had bloating a couple of times since last visit with food trigger     Omeprazole- has been helpful, is taking famitidine (twice a day), tums once a day. It is improved- is wondering if this is related to his back, IS going away     ROS: Came down with the flu, sinus infection. + sore throat. No cough or chest pain. On antibiotics. No fever or chills, no chest pain or SOB. Headaches has improved (Has been on zyrtect every day)  + anxiety- has been a little worsened with pandemic and change in work environment     Interval History 11/20/2020:   Symptoms seemed to worsen in the last three weeks especially (in the last month).     Feels sensation of \"pinched nerve\" in upper abdomen. Bentyl helps this symptom. Has been cooking at home more, healthier meals, weight loss (intentional), increased water.     Is taking pepto bismol, famotidine, omeprazole. Will have worsening symptoms if he has coffee in the afternoon. Rice from a chinese " restaurant will lead to symptoms.    Stool pattern: 4 times a day, can vary from normal, diarrhea, constipation. About a month ago had severe constipation for 2-3 days. Took magnesium citrate. Had a bowel movement had a lot of blood, there was blood in the water. Has installed a biday. Is taking 1 fiber pill a day. Can be hard or loose. Will sometimes strain with a bowel movement. Discomfort will improve when he does have a bowel movement. Recent bleeding does not feel like hemorrhoidal pain because he had cramping. Will wake up in the middle of the night with a bowel movement.     Distended belly, passing more gas, burping more. Belly will bloated, distended, will feel hard and will protrude.      Stopped iron pills     Will sometimes feel strange taste in mouth. No vomiting.     ROS:  Migraine- took a headache medication     PERTINENT PAST MEDICAL HISTORY:  Past Medical History:   Diagnosis Date     Anxiety      Depressive disorder      Esophageal reflux      Stomach problems    Depression    PREVIOUS SURGERIES:  None      ALLERGIES:   Allergies   Allergen Reactions     Codeine Anaphylaxis     Hydromorphone Nausea and Vomiting     Zofran DOES NOT HELP, only compounds problem, PER PT.        PERTINENT MEDICATIONS:  Current Outpatient Medications   Medication     dicyclomine (BENTYL) 10 MG capsule     Escitalopram Oxalate (LEXAPRO PO)     famotidine (PEPCID) 20 MG tablet     LORazepam (ATIVAN) 0.5 MG tablet     omeprazole (PRILOSEC) 40 MG DR capsule     No current facility-administered medications for this visit.        SOCIAL HISTORY:  Teaching, alcohol worsen symptoms   Social History     Socioeconomic History     Marital status: Single     Spouse name: Not on file     Number of children: Not on file     Years of education: Not on file     Highest education level: Not on file   Occupational History     Not on file   Social Needs     Financial resource strain: Not on file     Food insecurity     Worry: Not on file  "    Inability: Not on file     Transportation needs     Medical: Not on file     Non-medical: Not on file   Tobacco Use     Smoking status: Never Smoker     Smokeless tobacco: Never Used   Substance and Sexual Activity     Alcohol use: Yes     Drug use: No     Sexual activity: Yes     Partners: Male   Lifestyle     Physical activity     Days per week: Not on file     Minutes per session: Not on file     Stress: Not on file   Relationships     Social connections     Talks on phone: Not on file     Gets together: Not on file     Attends [employer] service: Not on file     Active member of club or organization: Not on file     Attends meetings of clubs or organizations: Not on file     Relationship status: Not on file     Intimate partner violence     Fear of current or ex partner: Not on file     Emotionally abused: Not on file     Physically abused: Not on file     Forced sexual activity: Not on file   Other Topics Concern     Not on file   Social History Narrative     Not on file       FAMILY HISTORY:  Mother: obesity, HL, borderline DM  Father: unknown    PERTINENT STUDIES:  [facility] Appointment on 07/20/2017   Component Date Value Ref Range Status     Radiologist flags 07/20/2017 Palpable cervical lymph node   Final             Kirby Keshawn Patel is a 35 year old male who is being evaluated via a billable video visit.      The patient has been notified of following:     \"This video visit will be conducted via a call between you and your physician/provider. We have found that certain health care needs can be provided without the need for an in-person physical exam.  This service lets us provide the care you need with a video conversation.  If a prescription is necessary we can send it directly to your pharmacy.  If lab work is needed we can place an order for that and you can then stop by our lab to have the test done at a later time.    Video visits are billed at different rates depending on your insurance coverage. " " Please reach out to your insurance provider with any questions.    If during the course of the call the physician/provider feels a video visit is not appropriate, you will not be charged for this service.\"    Patient has given verbal consent for Video visit? Yes  How would you like to obtain your AVS? MyChart  If you are dropped from the video visit, the video invite should be resent to: Text to cell phone: [phone]  Will anyone else be joining your video visit? No    Video-Visit Details    Type of service:  Video Visit    Video Start Time: 7:07 am  Video End Time: 7:44 am    Originating Location (pt. Location): Home    Distant Location (provider location):  [facility] [place]     Platform used for Video Visit: Audi Muñoz PA-C          "

## 2020-11-25 ENCOUNTER — TELEPHONE (OUTPATIENT)
Dept: GASTROENTEROLOGY | Facility: OUTPATIENT CENTER | Age: 35
End: 2020-11-25

## 2020-11-25 NOTE — TELEPHONE ENCOUNTER
Patient is scheduled for colonoscopy with Dr. Barraza    Spoke with: patient    Date of Procedure: 1/18/21    [place] [facility] [place]ed[place] Type [place]    Pre-op for [address] MAC na    Informed patient they will need an adult  yes    Informed Patient of COVID Test Requirement yes scheduled    Preferred Pharmacy for Pre Prescription n    Confirmed Nurse will call to complete assessment no    Additional comments: none

## 2020-12-01 DIAGNOSIS — Z11.59 ENCOUNTER FOR SCREENING FOR OTHER VIRAL DISEASES: Primary | ICD-10-CM

## 2020-12-27 ENCOUNTER — HEALTH MAINTENANCE LETTER (OUTPATIENT)
Age: 35
End: 2020-12-27

## 2021-01-14 DIAGNOSIS — Z11.59 ENCOUNTER FOR SCREENING FOR OTHER VIRAL DISEASES: ICD-10-CM

## 2021-01-14 LAB
SARS-COV-2 RNA RESP QL NAA+PROBE: NORMAL
SPECIMEN SOURCE: NORMAL

## 2021-01-15 LAB
LABORATORY COMMENT REPORT: NORMAL
SARS-COV-2 RNA RESP QL NAA+PROBE: NEGATIVE
SPECIMEN SOURCE: NORMAL

## 2021-01-18 ENCOUNTER — HOSPITAL ENCOUNTER (OUTPATIENT)
Facility: AMBULATORY SURGERY CENTER | Age: 36
Discharge: HOME | End: 2021-01-18
Attending: INTERNAL MEDICINE | Admitting: INTERNAL MEDICINE
Payer: COMMERCIAL

## 2021-01-18 VITALS
HEART RATE: 85 BPM | DIASTOLIC BLOOD PRESSURE: 78 MMHG | HEIGHT: 64 IN | TEMPERATURE: 97.6 F | SYSTOLIC BLOOD PRESSURE: 120 MMHG | BODY MASS INDEX: 33.8 KG/M2 | WEIGHT: 198 LBS | OXYGEN SATURATION: 95 % | RESPIRATION RATE: 16 BRPM

## 2021-01-18 LAB — COLONOSCOPY: NORMAL

## 2021-01-18 PROCEDURE — 45380 COLONOSCOPY AND BIOPSY: CPT

## 2021-01-18 PROCEDURE — 88305 TISSUE EXAM BY PATHOLOGIST: CPT | Mod: GC | Performed by: PATHOLOGY

## 2021-01-18 RX ORDER — NALOXONE HYDROCHLORIDE 0.4 MG/ML
0.4 INJECTION, SOLUTION INTRAMUSCULAR; INTRAVENOUS; SUBCUTANEOUS
Status: DISCONTINUED | OUTPATIENT
Start: 2021-01-18 | End: 2021-01-19 | Stop reason: HOSPADM

## 2021-01-18 RX ORDER — NALOXONE HYDROCHLORIDE 0.4 MG/ML
0.2 INJECTION, SOLUTION INTRAMUSCULAR; INTRAVENOUS; SUBCUTANEOUS
Status: DISCONTINUED | OUTPATIENT
Start: 2021-01-18 | End: 2021-01-19 | Stop reason: HOSPADM

## 2021-01-18 RX ORDER — FENTANYL CITRATE 50 UG/ML
INJECTION, SOLUTION INTRAMUSCULAR; INTRAVENOUS PRN
Status: DISCONTINUED | OUTPATIENT
Start: 2021-01-18 | End: 2021-01-18 | Stop reason: HOSPADM

## 2021-01-18 RX ORDER — LIDOCAINE 40 MG/G
CREAM TOPICAL
Status: DISCONTINUED | OUTPATIENT
Start: 2021-01-18 | End: 2021-01-18 | Stop reason: HOSPADM

## 2021-01-18 RX ORDER — PROCHLORPERAZINE MALEATE 10 MG
10 TABLET ORAL EVERY 6 HOURS PRN
Status: DISCONTINUED | OUTPATIENT
Start: 2021-01-18 | End: 2021-01-19 | Stop reason: HOSPADM

## 2021-01-18 RX ORDER — ONDANSETRON 4 MG/1
4 TABLET, ORALLY DISINTEGRATING ORAL EVERY 6 HOURS PRN
Status: DISCONTINUED | OUTPATIENT
Start: 2021-01-18 | End: 2021-01-19 | Stop reason: HOSPADM

## 2021-01-18 RX ORDER — ONDANSETRON 2 MG/ML
4 INJECTION INTRAMUSCULAR; INTRAVENOUS
Status: DISCONTINUED | OUTPATIENT
Start: 2021-01-18 | End: 2021-01-18 | Stop reason: HOSPADM

## 2021-01-18 RX ORDER — FLUMAZENIL 0.1 MG/ML
0.2 INJECTION, SOLUTION INTRAVENOUS
Status: ACTIVE | OUTPATIENT
Start: 2021-01-18 | End: 2021-01-18

## 2021-01-18 RX ORDER — ONDANSETRON 2 MG/ML
4 INJECTION INTRAMUSCULAR; INTRAVENOUS EVERY 6 HOURS PRN
Status: DISCONTINUED | OUTPATIENT
Start: 2021-01-18 | End: 2021-01-19 | Stop reason: HOSPADM

## 2021-01-18 RX ORDER — DIPHENHYDRAMINE HYDROCHLORIDE 50 MG/ML
INJECTION INTRAMUSCULAR; INTRAVENOUS PRN
Status: DISCONTINUED | OUTPATIENT
Start: 2021-01-18 | End: 2021-01-18 | Stop reason: HOSPADM

## 2021-01-18 RX ORDER — SIMETHICONE
LIQUID (ML) MISCELLANEOUS PRN
Status: DISCONTINUED | OUTPATIENT
Start: 2021-01-18 | End: 2021-01-18 | Stop reason: HOSPADM

## 2021-01-18 ASSESSMENT — MIFFLIN-ST. JEOR: SCORE: 1744.12

## 2021-01-20 LAB — COPATH REPORT: NORMAL

## 2021-04-15 ENCOUNTER — TELEPHONE (OUTPATIENT)
Dept: GASTROENTEROLOGY | Facility: CLINIC | Age: 36
End: 2021-04-15

## 2021-04-15 NOTE — TELEPHONE ENCOUNTER
Talked with patient to schedule a follow up video visit with Ivana Muñoz, next available.    Patient will call back to schedule.

## 2021-04-24 ENCOUNTER — HEALTH MAINTENANCE LETTER (OUTPATIENT)
Age: 36
End: 2021-04-24

## 2021-05-05 DIAGNOSIS — K21.9 GASTROESOPHAGEAL REFLUX DISEASE WITHOUT ESOPHAGITIS: ICD-10-CM

## 2021-05-06 NOTE — TELEPHONE ENCOUNTER
omeprazole (PRILOSEC) 40 MG DR capsule  Take 1 capsule (40 mg) by mouth 2 times daily for 14 days, THEN 1 capsule (40 mg) daily.       Last Written Prescription Date:  11/20/20  Last Fill Quantity: 90,   # refills: 3  Last Office Visit : 11/20/20   Return in about 2 months (around 1/20/2021).    Future Office visit:  none    Routing refill request to provider for review/approval because:  Per pharmacist, only 90 count left and patient request 180 count. Filled x 2 for 180 ct.     Per last visit, RTC in 2 months.     Scheduling has been notified to contact the pt for appointment.

## 2021-05-10 RX ORDER — OMEPRAZOLE 40 MG/1
40 CAPSULE, DELAYED RELEASE ORAL DAILY
Qty: 90 CAPSULE | Refills: 0 | Status: SHIPPED | OUTPATIENT
Start: 2021-05-10 | End: 2021-05-21

## 2021-05-21 ENCOUNTER — VIRTUAL VISIT (OUTPATIENT)
Dept: GASTROENTEROLOGY | Facility: CLINIC | Age: 36
End: 2021-05-21
Payer: COMMERCIAL

## 2021-05-21 VITALS — BODY MASS INDEX: 34.33 KG/M2 | WEIGHT: 200 LBS

## 2021-05-21 DIAGNOSIS — K58.0 IRRITABLE BOWEL SYNDROME WITH DIARRHEA: ICD-10-CM

## 2021-05-21 DIAGNOSIS — K21.9 GASTROESOPHAGEAL REFLUX DISEASE WITHOUT ESOPHAGITIS: ICD-10-CM

## 2021-05-21 DIAGNOSIS — Z79.899 CURRENT USE OF PROTON PUMP INHIBITOR: Primary | ICD-10-CM

## 2021-05-21 PROCEDURE — 99214 OFFICE O/P EST MOD 30 MIN: CPT | Mod: 95 | Performed by: PHYSICIAN ASSISTANT

## 2021-05-21 RX ORDER — OMEPRAZOLE 40 MG/1
40 CAPSULE, DELAYED RELEASE ORAL DAILY
Qty: 90 CAPSULE | Refills: 3 | Status: SHIPPED | OUTPATIENT
Start: 2021-05-21 | End: 2022-01-18

## 2021-05-21 RX ORDER — FAMOTIDINE 20 MG/1
20 TABLET, FILM COATED ORAL 2 TIMES DAILY
Qty: 120 TABLET | Refills: 5 | Status: SHIPPED | OUTPATIENT
Start: 2021-05-21 | End: 2022-08-25

## 2021-05-21 NOTE — PATIENT INSTRUCTIONS
It was a pleasure taking care of you today.  I've included a brief summary of our discussion and care plan from today's visit below.  Please review this information with your primary care provider.  ______________________________________________________________________    My recommendations are summarized as follows:    -- continue omeprazole- 40 mg a day  -- continue Pepcid (famotidine) 20 mg twice daily as needed  -- labs   -- rifaximin 550 mg three times a day  --Meet with our GI dietitian    Return to GI Clinic in 4 months to review your progress.    ______________________________________________________________________    How do I schedule labs, imaging studies, or procedures that were ordered in clinic today?     Labs: To schedule lab appointment at the [facility], use my chart or call [phone]. If you have a [place] lab closer to home where you are regularly seen you can give them a call.     Procedures: If a colonoscopy, upper endoscopy, breath test, esophageal manometry, or pH impedence was ordered today, our endoscopy team will call you to schedule this. If you have not heard from our endoscopy team within a week, please call [phone] to schedule.     Imaging Studies: If you were scheduled for a CT scan, X-ray, MRI, ultrasound, HIDA scan or other imaging study, please call [phone] to have this scheduled.     Referral: If a referral to another specialty was ordered, expect a phone call or follow instructions above. If you have not heard from anyone regarding your referral in a week, please call our clinic to check the status.     Who do I call with any questions after my visit?  Please be in touch if there are any further questions that arise following today's visit.  There are multiple ways to contact your gastroenterology care team.        During business hours, you may reach a Gastroenterology nurse at [phone]      To schedule or reschedule an appointment, please call  [phone].       You can always send a secure message through [employer].  [employer] messages are answered by your nurse or doctor typically within 24 hours.  Please allow extra time on weekends and holidays.        For urgent/emergent questions after business hours, you may reach the on-call GI Fellow by contacting the [facility]  at [phone].     How will I get the results of any tests ordered?    You will receive all of your results.  If you have signed up for [employer], any tests ordered at your visit will be available to you after your physician reviews them.  Typically this takes 1-2 weeks.  If there are urgent results that require a change in your care plan, your physician or nurse will call you to discuss the next steps.      What is [employer]?  [employer] is a secure way for you to access all of your healthcare records from the [facility].  It is a web based computer program, so you can sign on to it from any location.  It also allows you to send secure messages to your care team.  I recommend signing up for [employer] access if you have not already done so and are comfortable with using a computer.      How to I schedule a follow-up visit?  If you did not schedule a follow-up visit today, please call [phone] to schedule a follow-up office visit.      Sincerely,    Ivana Muñoz PA-C  Division of Gastroenterology, Hepatology & Nutrition  [facility]

## 2021-05-21 NOTE — PROGRESS NOTES
"Kirby Patel is a 35 year old male who is being evaluated via a billable video visit.      Please send link to email:  [email or web address]    The patient has been notified of following:     \"This video visit will be conducted via a call between you and your physician/provider. We have found that certain health care needs can be provided without the need for an in-person physical exam.  This service lets us provide the care you need with a video conversation.  If a prescription is necessary we can send it directly to your pharmacy.  If lab work is needed we can place an order for that and you can then stop by our lab to have the test done at a later time.    If during the course of the call the physician/provider feels a video visit is not appropriate, you will not be charged for this service.\"     Patient confirmed that they are in [place] for today's visit yes.    Video-Visit Details  Type of service:  Video Visit    Video Start Time: 8:04 AM  Video End Time:  8:27 AM    Originating Location (pt. Location): [place]    Distant Location (provider location):  [facility] [place]     Platform used: [facility]    GI CLINIC VISIT    CC/REFERRING MD:  Referred Self    ASSESSMENT/PLAN:  1. Epigastric pain  EGD 1/23/2020 stomach with erythema and a few erosions in the gastric antrum. Biopsies with duodenal mucosa with foveolar metaplasia consistent with peptic duodenitis, gastric biopsy with mild chronic gastritis features of reactive gastropathy and no H. Pylori. Symptoms may be due to GERD/dyspepsia. Overall symptoms improved on PPI-discussed plan for him to continue at this time, can check labs for chronic PPI monitoring. Can continue famotidine as needed at this time.  Should continue to follow lifestyle modifications and avoid food triggers for GERD/dyspepsia.  Future considerations include meeting with our dietitian.   -- continue omeprazole- 40 mg a day  -- continue Pepcid (famotidine) " 20 mg twice daily as needed  -- labs     2. Variable stool consistency, blood with bowel movements   Per patient has had unremarkable colonoscopy around 2016, repeat colonoscopy in 2021 without source of bleeding.  Discussed that if symptoms become more bothersome, can plan for colorectal surgery referral to evaluate with endoscopy for hemorrhoids.    Suspect symptoms are due to irritable bowel syndrome/functional bloating, potentially with overlap of small intestinal bacterial overgrowth.  Symptoms improved significantly with rifaximin, can plan for another course at this time.  In the meantime, should continue fiber.  May benefit from meeting with our GI dietitian to discuss a modified low FODMAP diet  -- rifaximin 550 mg three times a day  --Meet with our GI dietitian    Ivana Muñoz PA-C  Division of Gastroenterology, Hepatology & Nutrition  [facility]    35 Minutes was spent on the date of the encounter during chart review, history and exam, documentation, and further activities as noted       HPI:  Mr. Patel is a 33 year old male with anxiety and depression who is referred from [facility] for follow-up for abdominal pain. He has previously been seen by Dr. Ramsey.    Patient has some arthritis and GI symptoms, previously evaluated by gastroenterologist in [place].  Was diagnosed with irritable bowel syndrome and GERD.  Recommended dietary changes, omeprazole and patient did not remember outcomes those interventions.  Has also been seen through [place] gastroenterology between 2015 and 2016 at which time he had EGD and colonoscopy for blood in the stools-Per patient evaluation was normal.    Mr. Patel has had variable stool pattern with loose stools, tenesmus, no nocturnal stooling or fecal incontinence.  Also reports occasional constipation which improves with milk of magnesia.    Symptoms again became more bothersome in 1/20/2019 at which time he was in [place].  Symptoms  included fever, vomiting then symptoms switch to constipation.  Per patient stool studies were inconclusive.  In [place], patient described sharp pain and tightness on the left upper quadrant toward his back.The pain was occurring 3-5 times a day sporadically. It would last 30-45 minutes. An EKG in primary care clinic was reportedly normal.  At that visit he was prescribed omeprazole/Prilosec 20 mg BID and carafate and he states he made some dietary changes.  Has also been evaluated for this in the emergency department.Made significant dietary changes such as cutting out spicy foods, fried foods and acidic foods.  Continued on twice daily omeprazole and Carafate which resulted in more formed stools.    The patient underwent EGD 1/23/2020 with normal esophagus, stomach with erythema and a few erosions in the gastric antrum. Remainder normal. Normal duodenum.  Biopsies with duodenal mucosa with foveolar metaplasia consistent with peptic duodenitis, gastric biopsy with mild chronic gastritis features of reactive gastropathy and no H. pylori.    Given ongoing bright red blood per rectum, patient underwent colonoscopy 1/18/2021 without source of symptoms.    After his last visit, he was started on rifaximin.  He states while taking this medication and afterwards for about 2 months, his symptoms were entirely resolved.    About two weeks ago he must have had food poisoning. Initially started with severe acid reflux and this resulted in multiple trips to the bathroom with diarrhea for four days. He tried pepto bismol which did not help much. Of note, his partner also became sick.     Afterword he had constipation and bloating for couple of days.  Now symptoms are alternating between soft and formed stools.  When he has loose stools, it will be watery up to four times a day. There are days with a sharp pain on left side which will be followed by bloody stools.     Bloating: will try to walk more and use OTC bloating  medication. Can have associated nausea about once a week- this was worse with stomach bug. Is worsened at night and he will be distended.    Trying to cut down on greasy things. He is also walking more. He has also cut out soda. Has also been working on stress control.     Has taken dicyclomine as needed.     ROS:  Migraine- took a headache medication     PERTINENT PAST MEDICAL HISTORY:  Past Medical History:   Diagnosis Date     Anxiety      Depressive disorder      Esophageal reflux      Stomach problems    Depression    PREVIOUS SURGERIES:  None    PREVIOUS ENDOSCOPY:    Colonoscopy 2021:  Impression:          - The examined portion of the ileum was normal. Biopsied.                        - The entire examined colon is normal.                        - Biopsies were taken with a cold forceps from the right                        colon, left colon and rectum for evaluation of                        microscopic colitis.                        - Retroflexion was not performed as the rectal vault was                        small and patient would not tolerate retroflexion.   FINAL DIAGNOSIS:   A. TERMINAL ILEUM, BIOPSY:   - Ileal mucosa with no significant histologic abnormality   - No evidence of inflammation     B. RIGHT COLON, BIOPSY:   - Colonic mucosa with no significant histologic abnormality   - No evidence of inflammation     C. LEFT COLON, BIOPSY:   - Colonic mucosa with no significant histologic abnormality   - No evidence of inflammation     D. RECTUM, BIOPSY:   - Rectal mucosa with no significant histologic abnormality   - No evidence of inflammation     ALLERGIES:   Allergies   Allergen Reactions     Codeine Anaphylaxis     Hydromorphone Nausea and Vomiting     Zofran DOES NOT HELP, only compounds problem, PER PT.        PERTINENT MEDICATIONS:  Current Outpatient Medications   Medication     dicyclomine (BENTYL) 10 MG capsule     Escitalopram Oxalate (LEXAPRO PO)     famotidine (PEPCID) 20 MG tablet      LORazepam (ATIVAN) 0.5 MG tablet     omeprazole (PRILOSEC) 40 MG DR capsule     No current facility-administered medications for this visit.        SOCIAL HISTORY:  Teaching, alcohol worsen symptoms   Social History     Socioeconomic History     Marital status: Single     Spouse name: Not on file     Number of children: Not on file     Years of education: Not on file     Highest education level: Not on file   Occupational History     Not on file   Social Needs     Financial resource strain: Not on file     Food insecurity     Worry: Not on file     Inability: Not on file     Transportation needs     Medical: Not on file     Non-medical: Not on file   Tobacco Use     Smoking status: Never Smoker     Smokeless tobacco: Never Used   Substance and Sexual Activity     Alcohol use: Not Currently     Drug use: No     Sexual activity: Yes     Partners: Male   Lifestyle     Physical activity     Days per week: Not on file     Minutes per session: Not on file     Stress: Not on file   Relationships     Social connections     Talks on phone: Not on file     Gets together: Not on file     Attends [employer] service: Not on file     Active member of club or organization: Not on file     Attends meetings of clubs or organizations: Not on file     Relationship status: Not on file     Intimate partner violence     Fear of current or ex partner: Not on file     Emotionally abused: Not on file     Physically abused: Not on file     Forced sexual activity: Not on file   Other Topics Concern     Not on file   Social History Narrative     Not on file     Physical exam:  General appearance: Healthy appearing adult, in no acute distress  Eyes: Sclera anicteric  Ears, nose, mouth and throat: No obvious external lesions of ears and nose, Hearing intact  Neck: Symmetric, No obvious external lesions  Respiratory: Normal respiration, no use of accessory muscles   Skin: No rashes or jaundice   Psychiatric: Oriented to person, place and time,  Appropriate mood and affect.     FAMILY HISTORY:  Mother: obesity, HL, borderline DM  Father: unknown    PERTINENT STUDIES:  [facility] Appointment on 07/20/2017   Component Date Value Ref Range Status     Radiologist flags 07/20/2017 Palpable cervical lymph node   Final

## 2021-05-21 NOTE — NURSING NOTE
Chief Complaint   Patient presents with     Follow Up       Vitals:    05/21/21 0726   Weight: 90.7 kg (200 lb)       Body mass index is 34.33 kg/m .    Glo Gunn CMA

## 2021-05-21 NOTE — LETTER
"    5/21/2021         RE: Kirby Patel  [address]  [place] [address]        Dear Colleague,    Thank you for referring your patient, Kirby Patel, to the [facility] [place]. Please see a copy of my visit note below.    Kirby Patel is a 35 year old male who is being evaluated via a billable video visit.      Please send link to email:  [email or web address]    The patient has been notified of following:     \"This video visit will be conducted via a call between you and your physician/provider. We have found that certain health care needs can be provided without the need for an in-person physical exam.  This service lets us provide the care you need with a video conversation.  If a prescription is necessary we can send it directly to your pharmacy.  If lab work is needed we can place an order for that and you can then stop by our lab to have the test done at a later time.    If during the course of the call the physician/provider feels a video visit is not appropriate, you will not be charged for this service.\"     Patient confirmed that they are in [place] for today's visit yes.    Video-Visit Details  Type of service:  Video Visit    Video Start Time: 8:04 AM  Video End Time:  8:27 AM    Originating Location (pt. Location): Home    Distant Location (provider location):  [facility] [place]     Platform used: [facility]    GI CLINIC VISIT    CC/REFERRING MD:  Referred Self    ASSESSMENT/PLAN:  1. Epigastric pain  EGD 1/23/2020 stomach with erythema and a few erosions in the gastric antrum. Biopsies with duodenal mucosa with foveolar metaplasia consistent with peptic duodenitis, gastric biopsy with mild chronic gastritis features of reactive gastropathy and no H. Pylori. Symptoms may be due to GERD/dyspepsia. Overall symptoms improved on PPI-discussed plan for him to continue at this time, can check labs for " chronic PPI monitoring. Can continue famotidine as needed at this time.  Should continue to follow lifestyle modifications and avoid food triggers for GERD/dyspepsia.  Future considerations include meeting with our dietitian.   -- continue omeprazole- 40 mg a day  -- continue Pepcid (famotidine) 20 mg twice daily as needed  -- labs     2. Variable stool consistency, blood with bowel movements   Per patient has had unremarkable colonoscopy around 2016, repeat colonoscopy in 2021 without source of bleeding.  Discussed that if symptoms become more bothersome, can plan for colorectal surgery referral to evaluate with endoscopy for hemorrhoids.    Suspect symptoms are due to irritable bowel syndrome/functional bloating, potentially with overlap of small intestinal bacterial overgrowth.  Symptoms improved significantly with rifaximin, can plan for another course at this time.  In the meantime, should continue fiber.  May benefit from meeting with our GI dietitian to discuss a modified low FODMAP diet  -- rifaximin 550 mg three times a day  --Meet with our GI dietitian    Ivana Muñoz PA-C  Division of Gastroenterology, Hepatology & Nutrition  [facility]    35 Minutes was spent on the date of the encounter during chart review, history and exam, documentation, and further activities as noted       HPI:  Mr. Patel is a 33 year old male with anxiety and depression who is referred from [facility] for follow-up for abdominal pain. He has previously been seen by Dr. Ramsey.    Patient has some arthritis and GI symptoms, previously evaluated by gastroenterologist in [place].  Was diagnosed with irritable bowel syndrome and GERD.  Recommended dietary changes, omeprazole and patient did not remember outcomes those interventions.  Has also been seen through [place] gastro[facility]ology between 2015 and 2016 at which time he had EGD and colonoscopy for blood in the stools-Per patient evaluation was  normal.    Mr. Patel has had variable stool pattern with loose stools, tenesmus, no nocturnal stooling or fecal incontinence.  Also reports occasional constipation which improves with milk of magnesia.    Symptoms again became more bothersome in 1/20/2019 at which time he was in [place].  Symptoms included fever, vomiting then symptoms switch to constipation.  Per patient stool studies were inconclusive.  In [place], patient described sharp pain and tightness on the left upper quadrant toward his back.The pain was occurring 3-5 times a day sporadically. It would last 30-45 minutes. An EKG in primary care clinic was reportedly normal.  At that visit he was prescribed omeprazole/Prilosec 20 mg BID and carafate and he states he made some dietary changes.  Has also been evaluated for this in the emergency department.Made significant dietary changes such as cutting out spicy foods, fried foods and acidic foods.  Continued on twice daily omeprazole and Carafate which resulted in more formed stools.    The patient underwent EGD 1/23/2020 with normal esophagus, stomach with erythema and a few erosions in the gastric antrum. Remainder normal. Normal duodenum.  Biopsies with duodenal mucosa with foveolar metaplasia consistent with peptic duodenitis, gastric biopsy with mild chronic gastritis features of reactive gastropathy and no H. pylori.    Given ongoing bright red blood per rectum, patient underwent colonoscopy 1/18/2021 without source of symptoms.    After his last visit, he was started on rifaximin.  He states while taking this medication and afterwards for about 2 months, his symptoms were entirely resolved.    About two weeks ago he must have had food poisoning. Initially started with severe acid reflux and this resulted in multiple trips to the bathroom with diarrhea for four days. He tried pepto bismol which did not help much. Of note, his partner also became sick.     Afterword he had constipation and bloating  for couple of days.  Now symptoms are alternating between soft and formed stools.  When he has loose stools, it will be watery up to four times a day. There are days with a sharp pain on left side which will be followed by bloody stools.     Bloating: will try to walk more and use OTC bloating medication. Can have associated nausea about once a week- this was worse with stomach bug. Is worsened at night and he will be distended.    Trying to cut down on greasy things. He is also walking more. He has also cut out soda. Has also been working on stress control.     Has taken dicyclomine as needed.     ROS:  Migraine- took a headache medication     PERTINENT PAST MEDICAL HISTORY:  Past Medical History:   Diagnosis Date     Anxiety      Depressive disorder      Esophageal reflux      Stomach problems    Depression    PREVIOUS SURGERIES:  None    PREVIOUS ENDOSCOPY:    Colonoscopy 2021:  Impression:          - The examined portion of the ileum was normal. Biopsied.                        - The entire examined colon is normal.                        - Biopsies were taken with a cold forceps from the right                        colon, left colon and rectum for evaluation of                        microscopic colitis.                        - Retroflexion was not performed as the rectal vault was                        small and patient would not tolerate retroflexion.   FINAL DIAGNOSIS:   A. TERMINAL ILEUM, BIOPSY:   - Ileal mucosa with no significant histologic abnormality   - No evidence of inflammation     B. RIGHT COLON, BIOPSY:   - Colonic mucosa with no significant histologic abnormality   - No evidence of inflammation     C. LEFT COLON, BIOPSY:   - Colonic mucosa with no significant histologic abnormality   - No evidence of inflammation     D. RECTUM, BIOPSY:   - Rectal mucosa with no significant histologic abnormality   - No evidence of inflammation     ALLERGIES:   Allergies   Allergen Reactions     Codeine  Anaphylaxis     Hydromorphone Nausea and Vomiting     Zofran DOES NOT HELP, only compounds problem, PER PT.        PERTINENT MEDICATIONS:  Current Outpatient Medications   Medication     dicyclomine (BENTYL) 10 MG capsule     Escitalopram Oxalate (LEXAPRO PO)     famotidine (PEPCID) 20 MG tablet     LORazepam (ATIVAN) 0.5 MG tablet     omeprazole (PRILOSEC) 40 MG DR capsule     No current facility-administered medications for this visit.        SOCIAL HISTORY:  Teaching, alcohol worsen symptoms   Social History     Socioeconomic History     Marital status: Single     Spouse name: Not on file     Number of children: Not on file     Years of education: Not on file     Highest education level: Not on file   Occupational History     Not on file   Social Needs     Financial resource strain: Not on file     Food insecurity     Worry: Not on file     Inability: Not on file     Transportation needs     Medical: Not on file     Non-medical: Not on file   Tobacco Use     Smoking status: Never Smoker     Smokeless tobacco: Never Used   Substance and Sexual Activity     Alcohol use: Not Currently     Drug use: No     Sexual activity: Yes     Partners: Male   Lifestyle     Physical activity     Days per week: Not on file     Minutes per session: Not on file     Stress: Not on file   Relationships     Social connections     Talks on phone: Not on file     Gets together: Not on file     Attends Denominational service: Not on file     Active member of club or organization: Not on file     Attends meetings of clubs or organizations: Not on file     Relationship status: Not on file     Intimate partner violence     Fear of current or ex partner: Not on file     Emotionally abused: Not on file     Physically abused: Not on file     Forced sexual activity: Not on file   Other Topics Concern     Not on file   Social History Narrative     Not on file     Physical exam:  General appearance: Healthy appearing adult, in no acute distress  Eyes:  Sclera anicteric  Ears, nose, mouth and throat: No obvious external lesions of ears and nose, Hearing intact  Neck: Symmetric, No obvious external lesions  Respiratory: Normal respiration, no use of accessory muscles   Skin: No rashes or jaundice   Psychiatric: Oriented to person, place and time, Appropriate mood and affect.     FAMILY HISTORY:  Mother: obesity, HL, borderline DM  Father: unknown    PERTINENT STUDIES:  [facility] Appointment on 07/20/2017   Component Date Value Ref Range Status     Radiologist flags 07/20/2017 Palpable cervical lymph node   Final       Again, thank you for allowing me to participate in the care of your patient.      Sincerely,    Ivana Muñoz PA-C

## 2021-07-08 ENCOUNTER — MEDICAL CORRESPONDENCE (OUTPATIENT)
Dept: HEALTH INFORMATION MANAGEMENT | Facility: CLINIC | Age: 36
End: 2021-07-08

## 2021-07-08 ENCOUNTER — TRANSFERRED RECORDS (OUTPATIENT)
Dept: HEALTH INFORMATION MANAGEMENT | Facility: CLINIC | Age: 36
End: 2021-07-08

## 2021-07-15 ENCOUNTER — TRANSFERRED RECORDS (OUTPATIENT)
Dept: HEALTH INFORMATION MANAGEMENT | Facility: CLINIC | Age: 36
End: 2021-07-15

## 2021-10-09 ENCOUNTER — HEALTH MAINTENANCE LETTER (OUTPATIENT)
Age: 36
End: 2021-10-09

## 2021-12-15 ENCOUNTER — VIRTUAL VISIT (OUTPATIENT)
Dept: SLEEP MEDICINE | Facility: CLINIC | Age: 36
End: 2021-12-15
Payer: COMMERCIAL

## 2021-12-15 VITALS — WEIGHT: 200 LBS | BODY MASS INDEX: 34.15 KG/M2 | HEIGHT: 64 IN

## 2021-12-15 DIAGNOSIS — R53.81 MALAISE AND FATIGUE: ICD-10-CM

## 2021-12-15 DIAGNOSIS — R06.00 DYSPNEA AND RESPIRATORY ABNORMALITY: ICD-10-CM

## 2021-12-15 DIAGNOSIS — G47.19 EXCESSIVE DAYTIME SLEEPINESS: Primary | ICD-10-CM

## 2021-12-15 DIAGNOSIS — E66.812 CLASS 2 OBESITY DUE TO EXCESS CALORIES WITH BODY MASS INDEX (BMI) OF 35.0 TO 35.9 IN ADULT, UNSPECIFIED WHETHER SERIOUS COMORBIDITY PRESENT: ICD-10-CM

## 2021-12-15 DIAGNOSIS — R06.89 DYSPNEA AND RESPIRATORY ABNORMALITY: ICD-10-CM

## 2021-12-15 DIAGNOSIS — R53.83 MALAISE AND FATIGUE: ICD-10-CM

## 2021-12-15 DIAGNOSIS — E66.09 CLASS 2 OBESITY DUE TO EXCESS CALORIES WITH BODY MASS INDEX (BMI) OF 35.0 TO 35.9 IN ADULT, UNSPECIFIED WHETHER SERIOUS COMORBIDITY PRESENT: ICD-10-CM

## 2021-12-15 DIAGNOSIS — Z72.820 LACK OF ADEQUATE SLEEP: ICD-10-CM

## 2021-12-15 PROCEDURE — 99204 OFFICE O/P NEW MOD 45 MIN: CPT | Mod: 95 | Performed by: PHYSICIAN ASSISTANT

## 2021-12-15 ASSESSMENT — ENCOUNTER SYMPTOMS
DECREASED CONCENTRATION: 1
TINGLING: 0
DEPRESSION: 1
TASTE DISTURBANCE: 0
BLOATING: 1
HOARSE VOICE: 0
POLYDIPSIA: 0
SPEECH CHANGE: 0
BLOOD IN STOOL: 0
PARALYSIS: 0
DECREASED APPETITE: 0
WEIGHT LOSS: 0
SINUS PAIN: 1
DIZZINESS: 0
TROUBLE SWALLOWING: 0
RECTAL PAIN: 0
HALLUCINATIONS: 0
SHORTNESS OF BREATH: 0
CHILLS: 0
CONSTIPATION: 0
INCREASED ENERGY: 1
MEMORY LOSS: 1
POLYPHAGIA: 0
WEIGHT GAIN: 0
SNORES LOUDLY: 1
ABDOMINAL PAIN: 0
NERVOUS/ANXIOUS: 1
SORE THROAT: 0
SEIZURES: 0
JAUNDICE: 0
WHEEZING: 0
DISTURBANCES IN COORDINATION: 0
NUMBNESS: 0
INSOMNIA: 1
LOSS OF CONSCIOUSNESS: 0
HEADACHES: 1
NIGHT SWEATS: 0
SPUTUM PRODUCTION: 0
NAUSEA: 0
ALTERED TEMPERATURE REGULATION: 0
SINUS CONGESTION: 1
VOMITING: 0
FEVER: 0
TREMORS: 0
PANIC: 0
SMELL DISTURBANCE: 0
FATIGUE: 1
DIARRHEA: 1
BOWEL INCONTINENCE: 0
NECK MASS: 0
POSTURAL DYSPNEA: 0
WEAKNESS: 0
DYSPNEA ON EXERTION: 0
HEARTBURN: 0
COUGH DISTURBING SLEEP: 0
COUGH: 0
HEMOPTYSIS: 0

## 2021-12-15 ASSESSMENT — MIFFLIN-ST. JEOR: SCORE: 1748.19

## 2021-12-15 ASSESSMENT — SLEEP AND FATIGUE QUESTIONNAIRES
HOW LIKELY ARE YOU TO NOD OFF OR FALL ASLEEP IN A CAR, WHILE STOPPED FOR A FEW MINUTES IN TRAFFIC: WOULD NEVER DOZE
HOW LIKELY ARE YOU TO NOD OFF OR FALL ASLEEP WHILE SITTING AND READING: MODERATE CHANCE OF DOZING
HOW LIKELY ARE YOU TO NOD OFF OR FALL ASLEEP WHEN YOU ARE A PASSENGER IN A CAR FOR AN HOUR WITHOUT A BREAK: WOULD NEVER DOZE
HOW LIKELY ARE YOU TO NOD OFF OR FALL ASLEEP WHILE SITTING AND TALKING TO SOMEONE: SLIGHT CHANCE OF DOZING
HOW LIKELY ARE YOU TO NOD OFF OR FALL ASLEEP WHILE SITTING QUIETLY AFTER LUNCH WITHOUT ALCOHOL: HIGH CHANCE OF DOZING
HOW LIKELY ARE YOU TO NOD OFF OR FALL ASLEEP WHILE WATCHING TV: WOULD NEVER DOZE
HOW LIKELY ARE YOU TO NOD OFF OR FALL ASLEEP WHILE SITTING INACTIVE IN A PUBLIC PLACE: WOULD NEVER DOZE
HOW LIKELY ARE YOU TO NOD OFF OR FALL ASLEEP WHILE LYING DOWN TO REST IN THE AFTERNOON WHEN CIRCUMSTANCES PERMIT: HIGH CHANCE OF DOZING

## 2021-12-15 NOTE — PROGRESS NOTES
Kirby is a 36 year old who is being evaluated via a billable video visit.      How would you like to obtain your AVS? MyChart  If the video visit is dropped, the invitation should be resent by: Text to cell phone: [phone]  Will anyone else be joining your video visit? No      Video Start Time: 3:38 PM  Video-Visit Details    Type of service:  Video Visit    Video End Time:4:27 PM    Originating Location (pt. Location): Home    Distant Location (provider location):  [facility]     Platform used for Video Visit: [employer]       Sleep Consultation:    Date on this visit: 12/15/2021    Kirby Patel is sent by Branden Melendrez MD for a sleep consultation regarding loud snoring and frequent awakenings with choking and gasping.    Primary Physician: Riley, [facility]     CC: Daytime sleepiness.   HPI: Kirby Patel is a 36 year old  who complains of daytime somnolence/fatigue with lack of energy and non restorative sleep for about 2 years. He states that all he wants to do is sleep.  He does not remember antecedent illness or trigger.  He reports that EDS is constant.  He knows of nothing that helps or makes it worse. He has tried a stimulant (does not remember name) via his psychiatrist and stopped due to developing palpitations. He also tried sleep aid to help him sleep better, but he again developed palpitations and stopped.  He has history of anxiety and depression. He reports being anxious. He says he is stressed about work/school, health, and financial issues regaring housing for his parents. He has been on on Lexapro since 2016.   Patient did undergo a polysomnogram in 2011. That study did not demonstrate significant apnea. Weight at the time of testing was 150 lbs.   Recent laboratory work up showed low ferritin and vitamin D. TSH and CMP were within normal limits. He took iron for 3 weeks and stopped due to constipation.    Kirby goes to sleep  between 11PM and 12  AM during the week. He wakes up between 8-9 AM with an alarm. He falls asleep in 15 minutes.  Kirby denies difficulty falling asleep.  He wakes up 2-3 times a night for 10-45 minutes before falling back to sleep.  Kirby wakes up to go to the bathroom.  On weekends, Kirby goes to sleep between 11 PM and 12 AM.  He wakes up at 10:00 AM without an alarm. He falls asleep in 15 minutes.  Patient gets an average of 6 hours of sleep per night. He reports 6 hours used to be enough, but not anymore. He is not refreshed in the mornings.     Patient does not watch TV in bed.     Kirby does not do shift work.     Kirby does snore every night, frequently and snoring is soft to loud. Patient does have a regular bed partner. There is report of snoring and gasping.  He does not have witnessed apneas. They never sleep separately.  Patient sleeps on his stomach. He has occasional morning headaches and no morning confusion and restless legs.  Kirby denies any sleep walking, sleep talking, dream enactment, cataplexy and hypnogogic/hypnopompic hallucinations. He reports bruxism. He also reports 3-4 sleep paralysis through the years.     He confirms sleep walking as a child.  Kirby denies difficulty breathing through his nose.      Kirby has gained 50 pounds in 10 years.  Patient describes themself as a night person.  He would prefer to go to sleep at 2:00 AM and wake up at 10:00 AM.  Patient's Novi Sleepiness score 9/24 inconsistent with excessive  daytime sleepiness.      Kirby naps almost everyday for 120-180 minutes, feels unrefreshed after naps. He takes no inadvertant naps.  He denies falling asleep while driving. Patient was counseled on the importance of driving while alert, to pull over if drowsy, or nap before getting into the vehicle if sleepy.  He uses 1-2 cups/day of coffee. Last caffeine intake is usually before noon. He reports 2 glasses of wine twice a week. He denies illicit drugs including marijuana.    Allergies:     Allergies   Allergen Reactions     Codeine Anaphylaxis     Hydromorphone Nausea and Vomiting     Zofran DOES NOT HELP, only compounds problem, PER PT.        Medications:    Current Outpatient Medications   Medication Sig Dispense Refill     dicyclomine (BENTYL) 10 MG capsule Take 1 capsule (10 mg) by mouth as needed (up to 4 times a day as needed for abdominal pain) 60 capsule 4     Escitalopram Oxalate (LEXAPRO PO) Take 2.5 mg by mouth daily       famotidine (PEPCID) 20 MG tablet Take 1 tablet (20 mg) by mouth 2 times daily 120 tablet 5     LORazepam (ATIVAN) 0.5 MG tablet        omeprazole (PRILOSEC) 40 MG DR capsule Take 1 capsule (40 mg) by mouth daily 90 capsule 3       Problem List:  Patient Active Problem List    Diagnosis Date Noted     Kidney stone on right side 11/13/2016     Priority: Medium     Intractable vomiting with nausea, unspecified vomiting type 11/13/2016     Priority: Medium        Past Medical/Surgical History:  Past Medical History:   Diagnosis Date     Anxiety      Depressive disorder      Esophageal reflux      Stomach problems      Past Surgical History:   Procedure Laterality Date     COLONOSCOPY       COLONOSCOPY N/A 1/18/2021    Procedure: COLONOSCOPY, WITH  BIOPSY;  Surgeon: Spencer Barraza MD;  Location: [facility] OR       Social History:  Social History     Socioeconomic History     Marital status: Single     Spouse name: Not on file     Number of children: Not on file     Years of education: Not on file     Highest education level: Not on file   Occupational History     Not on file   Tobacco Use     Smoking status: Never Smoker     Smokeless tobacco: Never Used   Substance and Sexual Activity     Alcohol use: Not Currently     Drug use: No     Sexual activity: Yes     Partners: Male   Other Topics Concern     Not on file   Social History Narrative     Not on file     Social Determinants of Health     Financial Resource Strain: Not on file   Food Insecurity: Not on file    Transportation Needs: Not on file   Physical Activity: Not on file   Stress: Not on file   Social Connections: Not on file   Intimate Partner Violence: Not on file   Housing Stability: Not on file       Family History:  Family History   Problem Relation Age of Onset     Diabetes Mother      Obesity Mother      Hyperlipidemia Mother      Uterine Cancer Maternal Grandmother        Review of Systems:  Answers for HPI/ROS submitted by the patient on 12/15/2021  General Symptoms: Yes  Skin Symptoms: No  HENT Symptoms: Yes  EYE SYMPTOMS: No  HEART SYMPTOMS: No  LUNG SYMPTOMS: Yes  INTESTINAL SYMPTOMS: Yes  URINARY SYMPTOMS: No  REPRODUCTIVE SYMPTOMS: Yes  SKELETAL SYMPTOMS: No  BLOOD SYMPTOMS: No  NERVOUS SYSTEM SYMPTOMS: Yes  MENTAL HEALTH SYMPTOMS: Yes  Ear pain: No  Ear discharge: No  Hearing loss: No  Tinnitus: No  Nosebleeds: No  Congestion: Yes  Sinus pain: Yes  Trouble swallowing: No   Voice hoarseness: No  Mouth sores: No  Sore throat: No  Tooth pain: No  Gum tenderness: Yes  Bleeding gums: No  Change in taste: No  Change in sense of smell: No  Dry mouth: No  Hearing aid used: No  Neck lump: No  Fever: No  Loss of appetite: No  Weight loss: No  Weight gain: No  Fatigue: Yes  Night sweats: No  Chills: No  Increased stress: Yes  Excessive hunger: No  Excessive thirst: No  Feeling hot or cold when others believe the temperature is normal: No  Loss of height: No  Post-operative complications: No  Surgical site pain: No  Hallucinations: No  Change in or Loss of Energy: Yes  Hyperactivity: No  Confusion: No  Cough: No  Sputum or phlegm: No  Coughing up blood: No  Difficulty breating or shortness of breath: No  Snoring: Yes  Wheezing: No  Difficulty breathing on exertion: No  Nighttime Cough: No  Difficulty breathing when lying flat: No  Heart burn or indigestion: No  Nausea: No  Vomiting: No  Abdominal pain: No  Bloating: Yes  Constipation: No  Diarrhea: Yes  Blood in stool: No  Black stools: No  Rectal or Anal pain:  "No  Fecal incontinence: No  Yellowing of skin or eyes: No  Vomit with blood: No  Change in stools: Yes  Scrotal pain or swelling: No  Erectile dysfunction: No  Penile discharge: Yes  Genital ulcers: No  Reduced libido: Yes  Trouble with coordination: No  Dizziness or trouble with balance: No  Fainting or black-out spells: No  Memory loss: Yes  Headache: Yes  Seizures: No  Speech problems: No  Tingling: No  Tremor: No  Weakness: No  Difficulty walking: No  Paralysis: No  Numbness: No  Nervous or Anxious: Yes  Depression: Yes  Trouble sleeping: Yes  Trouble thinking or concentrating: Yes  Mood changes: Yes  Panic attacks: No      Physical Examination:  Vitals: Ht 1.626 m (5' 4\")   Wt 90.7 kg (200 lb)   BMI 34.33 kg/m    BMI= Body mass index is 34.33 kg/m .         [place] Total Score 12/15/2021   Total score - [place] 9       JERRY Total Score: 19 (12/15/21 1346)    GENERAL APPEARANCE: alert and no distress  EYES: Eyes grossly normal to inspection  RESP: breathing is non-labored  NEURO: mentation intact and speech normal  PSYCH: mentation appears normal and affect normal/bright    Last Comprehensive Metabolic Panel:  Sodium   Date Value Ref Range Status   11/27/2018 139 133 - 144 mmol/L Final     Potassium   Date Value Ref Range Status   11/27/2018 3.8 3.4 - 5.3 mmol/L Final     Chloride   Date Value Ref Range Status   11/27/2018 108 94 - 109 mmol/L Final     Carbon Dioxide   Date Value Ref Range Status   11/27/2018 25 20 - 32 mmol/L Final     Anion Gap   Date Value Ref Range Status   11/27/2018 7 3 - 14 mmol/L Final     Glucose   Date Value Ref Range Status   11/27/2018 120 (H) 70 - 99 mg/dL Final     Urea Nitrogen   Date Value Ref Range Status   11/27/2018 18 7 - 30 mg/dL Final     Creatinine   Date Value Ref Range Status   11/27/2018 0.76 0.66 - 1.25 mg/dL Final     GFR Estimate   Date Value Ref Range Status   11/27/2018 >90 >60 mL/min/1.7m2 Final     Comment:     Non  GFR Calc     Calcium   Date " Value Ref Range Status   11/27/2018 9.1 8.5 - 10.1 mg/dL Final     TSH   Date Value Ref Range Status   12/11/2016 0.79 0.40 - 4.00 mU/L Final     Impression/Plan:     1. Excessive daytime sleepiness/fatigue.  Differential diagnosis:  - Inadequate total sleep time likely partially related, we discussed insufficient sleep time   - Sleep disruption/obstructive sleep apnea likely with obesity, snoring, excessive daytime sleepiness/fatigue (ESS 9), gasping arousals, morning headaches and difficulty maintaining asleep.   - Medications (Lexapro) possible though not clearly temporally related  - Circadian misalignment likely partially related, we discussed avoiding sleeping in on weekends.   - Narcolepsy/ idiopathic hypersomnolence, less likely with with age of onset and negative narcolepsy triad.     Plan: Polysomnogram (using 4% desaturation/Medicare/ AASM 1B scoring rules) to evaluate for obstructive sleep apnea.     2. Low ferritin and did not tolerate iron supplementation. Consider Vitron C with Colace since constipation is a problem. Could also consider IV iron.   Also consider Vitamin D     3. Depression and anxiety: Kirby to follow up with his psychiatrist to optimize treatment.    Literature provided regarding sleep apnea.      He will follow up with me in approximately two weeks after his sleep study has been competed to review the results and discuss plan of care.       Polysomnography reviewed.  Obstructive sleep apnea reviewed.  Complications of untreated sleep apnea were reviewed.    Tommy Eisenberg PA-C    CC:  Branden Melendrez

## 2021-12-15 NOTE — PATIENT INSTRUCTIONS
Your BMI is Body mass index is 34.33 kg/m .  Weight management is a personal decision.  If you are interested in exploring weight loss strategies, the following discussion covers the approaches that may be successful. Body mass index (BMI) is one way to tell whether you are at a healthy weight, overweight, or obese. It measures your weight in relation to your height.  A BMI of 18.5 to 24.9 is in the healthy range. A person with a BMI of 25 to 29.9 is considered overweight, and someone with a BMI of 30 or greater is considered obese. More than two-thirds of American adults are considered overweight or obese.  Being overweight or obese increases the risk for further weight gain. Excess weight may lead to heart disease and diabetes.  Creating and following plans for healthy eating and physical activity may help you improve your health.  Weight control is part of healthy lifestyle and includes exercise, emotional health, and healthy eating habits. Careful eating habits lifelong are the mainstay of weight control. Though there are significant health benefits from weight loss, long-term weight loss with diet alone may be very difficult to achieve- studies show long-term success with dietary management in less than 10% of people. Attaining a healthy weight may be especially difficult to achieve in those with severe obesity. In some cases, medications, devices and surgical management might be considered.  What can you do?  If you are overweight or obese and are interested in methods for weight loss, you should discuss this with your provider.     Consider reducing daily calorie intake by 500 calories.     Keep a food journal.     Avoiding skipping meals, consider cutting portions instead.    Diet combined with exercise helps maintain muscle while optimizing fat loss. Strength training is particularly important for building and maintaining muscle mass. Exercise helps reduce stress, increase energy, and improves fitness.  Increasing exercise without diet control, however, may not burn enough calories to loose weight.       Start walking three days a week 10-20 minutes at a time    Work towards walking thirty minutes five days a week     Eventually, increase the speed of your walking for 1-2 minutes at time    In addition, we recommend that you review healthy lifestyles and methods for weight loss available through the National Institutes of Health patient information sites:  http://win.niddk.nih.gov/publications/index.htm    And look into health and wellness programs that may be available through your health insurance provider, employer, local community center, or mara club.    Weight management plan: Patient was referred to their PCP to discuss a diet and exercise plan.   MY CONTACT NUMBERS ARE: [phone]  DOCTOR : MERCEDES Zambrano  SLEEP CENTER :   CPAP EQUIPMENT :    IF I HAVE SLEEP APNEA.....  WHERE CAN I FIND MORE INFORMATION?    American Academy of Sleep Medicine Patient information on sleep disorders:  http://yoursleep.aasmnet.org    THINGS TO REMEMBER  In most situations, sleep apnea is a lifelong disease that must be managed with daily therapy. Untreated disease, when severe, may result in an increased risk for an array of problems from heart disease to mood changes, car accidents and shorter lifespan.    CPAP -  WHY AND HOW?  Continuous positive airway pressure, or CPAP, is the most effective treatment for obstructive sleep apnea. A decision to use CPAP is a major step forward in the pursuit of a healthier life. The successful use of CPAP will help you breathe easier, sleep better and live healthier. Using CPAP can be a positive experience if you keep these harden points in mind:  1. Commitment  CPAP is not a quick fix for your problem. It involves a long-term commitment to improve your sleep and your health.    2. Communication  Stay in close communication with both your sleep doctor and your CPAP supplier. Ask lots of  "questions and seek help when you need it.    3. Consistency  Use CPAP all night, every night and for every nap. You will receive the maximum health benefits from CPAP when you use it every time that you sleep. This will also make it easier for your body to adjust to the treatment.    4. Correction  The first machine and mask that you try may not be the best ones for you. Work with your sleep doctor and your CPAP supplier to make corrections to your equipment selection. Ask about trying a different type of machine or mask if you have ongoing problems. Make sure that your mask is a good fit and learn to use your equipment properly.    5. Challenge  Tell a family member or close friend to ask you each morning if you used your CPAP the previous night. Have someone to challenge you to give it your best effort.    6. Connection   Your adjustment to CPAP will be easier if you are able to connect with others who use the same treatment. Ask your sleep doctor if there is a support group in your area for people who have sleep apnea, or look for one on the Internet.    7. Comfort   Increase your level of comfort by using a saline spray, decongestant or heated humidifier if CPAP irritates your nose, mouth or throat. Use your unit's \"ramp\" setting to slowly get used to the air pressure level. There may be soft pads you can buy that will fit over your mask straps. Look on www.CPAP.com for accessories such as these straps, a pillow contoured for side-sleeping with CPAP, longer hoses, hose covers to reduce condensation, or stands to keep the hose out of your way.                                 8. Cleaning   Clean your mask, tubing and headgear on a regular basis. Put this time in your schedule so that you don't forget to do it. Check and replace the filters for your CPAP unit and humidifier.    9. Completion   Although you are never finished with CPAP therapy, you should reward yourself by celebrating the completion of your first " month of treatment. Expect this first month to be your hardest period of adjustment. It will involve some trial and error as you find the machine, mask and pressure settings that are right for you.    Continuation  After your first month of treatment, continue to make a daily commitment to use your CPAP all night, every night and for every nap.    CPAP-Tips to starting with success:  Begin using your CPAP for short periods of time during the day while you watch TV or read.    Use CPAP every night and for every nap. Using it less often reduces the health benefits and makes it harder for your body to get used to it.    Newer CPAP models are virtually silent; however, if you find the sound of your CPAP machine to be bothersome, place the unit under your bed to dampen the sound.     Make small adjustments to your mask, tubing, straps and headgear until you get the right fit. Tightening the mask may actually worsen the leak.  If it leaves significant marks on your face or irritates the bridge of your nose, it may not be the best mask for you.  Speak with the person who supplied the mask and consider trying other masks.    Use a saline nasal spray to ease mild nasal congestion. Neti-Pot or saline nasal rinses may also help. Nasal gel sprays can help reduce nasal dryness.  Biotene mouthwash can be helpful to protect your teeth if you experience frequent dry mouth.  Dry mouth may be a sign of air escaping out of your mouth or out of the mask in the case of a full face mask.  Speak with your provider if you expect that is the case.     Take a nasal decongestant to relieve more severe nasal or sinus congestion.  Do not use Afrin (oxymetazoline) nasal spray more than 3 days in a row.  Speak with your sleep doctor if your nasal congestion is chronic.    Use a heated humidifier that fits your CPAP model to enhance your breathing comfort. Adjust the heat setting up if you get a dry nose or throat, down if you get condensation in  the hose or mask.  Position the CPAP lower than you so that any condensation in the hose drains back into the machine rather than towards the mask.    Try a system that uses nasal pillows if traditional masks give you problems.    Clean your mask, tubing and headgear once a week. Make sure the equipment dries fully.    Regularly check and replace the filters for your CPAP unit and humidifier.    Work closely with your sleep doctor and your CPAP supplier to make sure that you have the machine, mask and air pressure setting that works best for you.    BESIDES CPAP, WHAT OTHER THERAPIES ARE THERE?    Postioning devices if you only have the problem on your back    Dental devices if your condition is mild    Nasal valves may be effective though experience is limited    Tongue Retaining Device if missing teeth precludes the use of a dental device    Weight loss if you are overweight    Surgery in limited cases where devices are not acceptable or there are problems with structures in the nose and throat  If treated with one of these alternative options, further evaluation is necessary to ensure that the therapy is effective. This may require some form of testing.     Healthy Lifestyle:  Healthy diet, exercise and Limit alcohol: Not only will excessive alcohol increase your weight over time, but it irritates the throat tissues and make them swell, shrinking the airway and causing snoring. Drinking alcohol should be limited and stopped within 3-4 hours before going to bed.   Stop smoking: (Red swollen throat, heat, nicotine), also irritates and swells the airway, among numerous other negative health consequences.    Positioning Device  This example shows a pillow that straps around the waist. It may be appropriate for those whose sleep study shows milder sleep apnea that occurs primarily when lying flat on one's back. Preliminary studies have shown benefit but effectiveness at home should be verified.    Nasal Valves               Nasal valves may not be effective if you have frequent nasal congestion or have difficulty breathing through your nose. They may be an option for mild apnea if other options are not well tolerated. The efficacy of these devices is generally less than CPAP or oral appliances.  Oral Appliance  These are examples of two of many custom-made devices that are more likely to work in mild sleep apnea  Oral appliances are dental mouth pieces that fit very much like a sports mouth guards or removable orthodontic retainers. They are used to treat snoring  And obstructive sleep apnea . The device prevents the airway from collapsing by either holding the tongue or supporting the jaw in a forward position. Since oral appliances are non-invasive and easy to use, they may be considered as an early treatment option. Oral appliance therapy (OAT) involves the customization, selection, fabrication, fitting, adjustments and long-term follow-up care of specially designed oral devices, worn during sleep, which reposition the lower jaw and tongue base forward to maintain an open airway.  Custom made oral appliances are proven to be more effective than over-the-counter devices. Therefore, the over-the-counter devices are recommended not to be used as a screening tool nor as a therapeutic option.  Who gets a dental device?  Oral appliance therapy can be used as an alternative to  CPAP therapy for the treatment of mild to moderate sleep apnea and for those patients who prefer OAT to CPAP. Oral appliance therapy is a first line therapy for the treatment of primary snoring. Additionally, OAT is an option for those that cannot tolerate CPAP as therapy or who have experienced insufficient surgical results.    Possible side effects?  Frequent but minor side effects include: excessive salivation, dry mouth, discomfort of teeth and jaw and temporary changes in the patient s bite.  Potential complications include: jaw pain, permanent occlusal  changes and TMJ symptoms.  The above mentioned side effects and complications can be recognized and managed by dentists trained in dental sleep medicine.    Finding a dentist that practices dental sleep medicine  Specific training is available through the American Academy of Dental Sleep Medicine for dentists interested in working in the field of sleep. To find a dentist who is educated in the field of sleep and the use of oral appliances, near you, visit the Web site of the American Academy of Dental Sleep Medicine; also see http://www.accpstorage.org/newOrganization/patients/oralAppliances.pdf   To search for a dentist certified in these practices:  Http://aadsm.org/FindADentist.aspx?1  Http://www.accpstorage.org/newOrganization/patients/oralAppliances.pdf    Tongue Retaining Device               Tongue Retaining Devices are devices that generally  suction cup  onto the tongue preventing it from falling back into the back of the throat during sleep.  They may be an option for people missing teeth, but can be uncomfortable. This particular device can be purchased online, but similar devices made by dentists fit more precisely and may be tolerated better. In general, they are rarely effective and often not very well tolerated.    Weight Loss:  Some patients may experience reduction or elimination of sleep apnea with weight loss.  Though there are significant health benefits from weight loss, long-term weight loss is very difficult to achieve- studies show success with dietary management in less that 10% of people.     If you are interested in dietary weight loss, you should review the options discussed at the National Institutes of Health patient information site:     Http:/www.health.nih.gov/topic/WeightLossDieting    Bariatric programs offer counseling in all methods of weight loss:    Http:/www.[facility].org/Specialties/WeightLossSurgeryandMedicalMgmt/htm    Surgery:  There are a number of surgeries that  "have been attempted to treat apnea. In general, surgical options are usually reserved for cases in which there is a physical abnormality contributing to obstruction or other treatment options are ineffective or not tolerated. Most surgical options are either unreliable or quite invasive. One of the more common procedures is:  Uvulopalatopharyngoplasty: In this procedure, the uvula (the finger-like tissue that hangs in the back of the throat), part of the soft palate (the tissue that the uvula is attached to), and sometimes the tonsils or adenoids are removed. The efficacy of this surgery is around 30-50% .  After surgery, complications may include:  Sleepiness and sleep apnea related to post-surgery medication   Swelling, infection and bleeding   A sore throat and/or difficulty swallowing   Drainage of secretions into the nose and a nasal quality to the voice. English language speech does not seem to be affected by this surgery.   Narrowing of the airway in the nose and throat (hence constricting breathing) snoring and even iatrogenically caused sleep apnea. By cutting the tissues, excess scar tissue can \"tighten\" the airway and make it even smaller than it was before UPPP.  Patients who have had the uvula removed will become unable to correctly speak [place]an or any other language that has a uvular 'r' phoneme.    Surgeries to help resolve nasal congestion may help reduce the severity of apnea slightly. Nasal congestion does not cause apnea on its own, so these surgeries are usually not performed just for DIEGO.  They may be worth considering if the nasal congestion is significantly bothersome independent of apnea.        "

## 2021-12-20 NOTE — PROGRESS NOTES
Patient has been contacted to schedule a sleep study per providers orders. A message was left for patient to contact the sleep clinic to schedule sleep testing and follow up appointment.   Velma Infante MA

## 2022-01-11 ENCOUNTER — DOCUMENTATION ONLY (OUTPATIENT)
Dept: GASTROENTEROLOGY | Facility: CLINIC | Age: 37
End: 2022-01-11
Payer: COMMERCIAL

## 2022-01-11 NOTE — PROGRESS NOTES
Called to remind patient of their upcoming appointment with our GI clinic, on January 18, 2022 at 3:00 PM    with Ivana Muñoz. This appointment is scheduled as a video visit. You will receive a call approximately 30 minutes prior to check you in, you must be in [place] for this visit., if your appointment is virtual (video or telephone) you need to be in [place] for the visit. To reschedule or cancel patient to call [phone].    Voicemail left with patient.     Jayde Mock LPN

## 2022-01-18 ENCOUNTER — TELEPHONE (OUTPATIENT)
Dept: SLEEP MEDICINE | Facility: CLINIC | Age: 37
End: 2022-01-18

## 2022-01-18 ENCOUNTER — VIRTUAL VISIT (OUTPATIENT)
Dept: GASTROENTEROLOGY | Facility: CLINIC | Age: 37
End: 2022-01-18
Payer: COMMERCIAL

## 2022-01-18 DIAGNOSIS — G43.909 MIGRAINE WITHOUT STATUS MIGRAINOSUS, NOT INTRACTABLE, UNSPECIFIED MIGRAINE TYPE: ICD-10-CM

## 2022-01-18 DIAGNOSIS — K21.9 GASTROESOPHAGEAL REFLUX DISEASE WITHOUT ESOPHAGITIS: ICD-10-CM

## 2022-01-18 DIAGNOSIS — K59.00 CONSTIPATION, UNSPECIFIED CONSTIPATION TYPE: Primary | ICD-10-CM

## 2022-01-18 PROCEDURE — 99215 OFFICE O/P EST HI 40 MIN: CPT | Mod: 95 | Performed by: PHYSICIAN ASSISTANT

## 2022-01-18 RX ORDER — OMEPRAZOLE 40 MG/1
40 CAPSULE, DELAYED RELEASE ORAL DAILY
Qty: 90 CAPSULE | Refills: 3 | Status: SHIPPED | OUTPATIENT
Start: 2022-01-18 | End: 2023-02-08

## 2022-01-18 NOTE — PATIENT INSTRUCTIONS
It was a pleasure taking care of you today.  I've included a brief summary of our discussion and care plan from today's visit below.  Please review this information with your primary care provider.  ______________________________________________________________________    My recommendations are summarized as follows:    -- Start magnesium oxide 400 mg at bedtime.  If this is too much, you can take half a pill.  If you still continue to have constipation and overflow diarrhea might you can increase the dose as needed up to 2000 mg at bedtime  -- Try to avoid carbonation, straws, gum, hard candy.  Avoid broccoli, cauliflower, [place] sprouts, garlic onion and other foods that are considered to be high FODMAP foods as this can worsen underlying bloating  -- Simethicone can be used as needed  -- Continue peppermint tea  -- Continue omeprazole and Pepcid  -- Meet with neurology to discuss migraines  -- please see scheduling information provided below     Return to GI Clinic in 3 months to review your progress.    ______________________________________________________________________    How do I schedule labs, imaging studies, or procedures that were ordered in clinic today?     Labs: To schedule lab appointment at the [facility], use my chart or call [phone]. If you have a [place] lab closer to home where you are regularly seen you can give them a call.     Procedures: If a colonoscopy, upper endoscopy, breath test, esophageal manometry, or pH impedence was ordered today, our endoscopy team will call you to schedule this. If you have not heard from our endoscopy team within a week, please call [phone] to schedule.     Imaging Studies: If you were scheduled for a CT scan, X-ray, MRI, ultrasound, HIDA scan or other imaging study, please call [phone] to have this scheduled.     Referral: If a referral to another specialty was ordered, expect a phone call or follow instructions above. If you  have not heard from anyone regarding your referral in a week, please call our clinic to check the status.     Who do I call with any questions after my visit?  Please be in touch if there are any further questions that arise following today's visit.  There are multiple ways to contact your gastroenterology care team.        During business hours, you may reach a Gastroenterology nurse at [phone]      To schedule or reschedule an appointment, please call [phone].       You can always send a secure message through [employer].  [employer] messages are answered by your nurse or doctor typically within 24 hours.  Please allow extra time on weekends and holidays.        For urgent/emergent questions after business hours, you may reach the on-call GI Fellow by contacting the [facility]  at [phone].     How will I get the results of any tests ordered?    You will receive all of your results.  If you have signed up for [employer], any tests ordered at your visit will be available to you after your provider reviews them.  Typically this takes 1-2 weeks.  If there are urgent results that require a change in your care plan, your provider or nurse will call you to discuss the next steps.      What is [employer]?  [employer] is a secure way for you to access all of your healthcare records from the [facility].  It is a web based computer program, so you can sign on to it from any location.  It also allows you to send secure messages to your care team.  I recommend signing up for [employer] access if you have not already done so and are comfortable with using a computer.      How to I schedule a follow-up visit?  If you did not schedule a follow-up visit today, please call [phone] to schedule a follow-up office visit.      Sincerely,    Ivana Muñoz PA-C  Division of Gastroenterology, Hepatology & Nutrition  [facility]

## 2022-01-18 NOTE — TELEPHONE ENCOUNTER
Reason for Call:  Other appointment    Detailed comments: patient is waiting for his call to rene duffy sleep study    Phone Number Patient can be reached at: Home number on file [phone] (home)    Best Time: anytime    Can we leave a detailed message on this number? YES    Call taken on 1/18/2022 at 2:55 PM by Kay Keller

## 2022-01-18 NOTE — LETTER
1/18/2022         RE: Kirby Patel  [address]  [facility] [address]        Dear Colleague,    Thank you for referring your patient, Kirby Patel, to the [facility] [place]. Please see a copy of my visit note below.        GI CLINIC VISIT    CC/REFERRING MD:  Referred Self    ASSESSMENT/PLAN:  1. Bloating, variable stool consistency  Most bothersome symptom today is abdominal bloating.  Reports that his stool pattern has also been alternating between constipation and diarrhea.  Most recent colonoscopy without source of symptoms.  I suspect his symptoms are due to underlying constipation and overflow diarrhea.  He also reports increased fatigue and anxiety recently which could worsen underlying GI symptoms.  Would recommend that he try magnesium oxide to regulate stool consistency.  I also encouraged him to follow a low gas diet.  He reports benefit with peppermint tea which he can [id number] at this time.  Does report some bright red blood per rectum, suspect this is due to hemorrhoids versus fissure.  Recommend trial of Tucks pads.    For his upper GI symptoms, would recommend continuing omeprazole and Pepcid  -- Start magnesium oxide 400 mg at bedtime.  If this is too much, you can take half a pill.  If you still continue to have constipation and overflow diarrhea might you can increase the dose as needed up to 2000 mg at bedtime  -- Try to avoid carbonation, straws, gum, hard candy.  Avoid broccoli, cauliflower, [place] sprouts, garlic onion and other foods that are considered to be high FODMAP foods as this can worsen underlying bloating  -- Simethicone can be used as needed  -- Continue peppermint tea  -- Continue omeprazole and Pepcid  -- Meet with neurology to discuss migraines    2. Headache  Patient reports increased migraines recently with at least once to twice a week.  Would benefit from a neurology referral to discuss this further as NSAIDs may  worsen his underlying GI symptoms.    Ivana Muñoz PA-C  Division of Gastroenterology, Hepatology & Nutrition  [facility]    43  Minutes was spent on the date of the encounter during chart review, history and exam, documentation, and further activities as noted       HPI:  Mr. Patel is a 36 year old male with anxiety and depression who is referred from [facility] for follow-up for abdominal pain. He has previously been seen by Dr. Ramsey.    Patient has some arthritis and GI symptoms, previously evaluated by gastroenterologist in [place].  Was diagnosed with irritable bowel syndrome and GERD.  Recommended dietary changes, omeprazole and patient did not remember outcomes those interventions.  Has also been seen through [place] gastroenterology between 2015 and 2016 at which time he had EGD and colonoscopy for blood in the stools-Per patient evaluation was normal.    Mr. Patel has had variable stool pattern with loose stools, tenesmus, no nocturnal stooling or fecal incontinence.  Also reports occasional constipation which improves with milk of magnesia.    Symptoms again became more bothersome in 1/20/2019 at which time he was in [place].  Symptoms included fever, vomiting then symptoms switch to constipation.  Per patient stool studies were inconclusive.  In [place], patient described sharp pain and tightness on the left upper quadrant toward his back.The pain was occurring 3-5 times a day sporadically. It would last 30-45 minutes. An EKG in primary care clinic was reportedly normal.  At that visit he was prescribed omeprazole/Prilosec 20 mg BID and carafate and he states he made some dietary changes.  Has also been evaluated for this in the emergency department.Made significant dietary changes such as cutting out spicy foods, fried foods and acidic foods.  Continued on twice daily omeprazole and Carafate which resulted in more formed stools.    The patient underwent EGD  1/23/2020 with normal esophagus, stomach with erythema and a few erosions in the gastric antrum. Remainder normal. Normal duodenum.  Biopsies with duodenal mucosa with foveolar metaplasia consistent with peptic duodenitis, gastric biopsy with mild chronic gastritis features of reactive gastropathy and no H. pylori.    Given ongoing bright red blood per rectum, patient underwent colonoscopy 1/18/2021 without source of symptoms.    Has previously been on rifaximin for his symptoms- with initial course he had resolution of his symptoms. Was last seen in 5/2021 at which time he was recommended to complete additional course of rifaximin.     Interval history 1/18/2022:  He has had issues with sleep and feels very fatigued.. Recently with his bowel pattern he will have bloating and constipation-- and then this results in diarrhea. He does not report any changes in medication or diet to prompt symptoms. Did seem to have increased stress. He tried gas-ex, famotidine, and tried walking around which did help with the symptoms.     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     He reports that he is more anxious at baseline-- medication has been increased about 4-5 days ago. He continues to have blood in his stools with constipations. He reports that he is having more constipation than diarrhea. He finds that fiber can be constipation.    GERD- right before going to sleep he will be very hungry and then will eat a meal which can be heavy, and then he will suffer from GERD. If he forgets to take this he will have GERD.     The most recent course of rifaximin did not help as much as before.     ROS:  Migraine- at least once to twice a week. He is wondering if allergies might be contributing to this. Excedrin worked but he would feel anxious with this     PERTINENT PAST MEDICAL HISTORY:  Past Medical History:   Diagnosis Date     Anxiety      Depressive disorder      Esophageal  reflux      Stomach problems    Depression    PREVIOUS SURGERIES:  None    PREVIOUS ENDOSCOPY:    Colonoscopy 2021:  Impression:          - The examined portion of the ileum was normal. Biopsied.                        - The entire examined colon is normal.                        - Biopsies were taken with a cold forceps from the right                        colon, left colon and rectum for evaluation of                        microscopic colitis.                        - Retroflexion was not performed as the rectal vault was                        small and patient would not tolerate retroflexion.   FINAL DIAGNOSIS:   A. TERMINAL ILEUM, BIOPSY:   - Ileal mucosa with no significant histologic abnormality   - No evidence of inflammation     B. RIGHT COLON, BIOPSY:   - Colonic mucosa with no significant histologic abnormality   - No evidence of inflammation     C. LEFT COLON, BIOPSY:   - Colonic mucosa with no significant histologic abnormality   - No evidence of inflammation     D. RECTUM, BIOPSY:   - Rectal mucosa with no significant histologic abnormality   - No evidence of inflammation     ALLERGIES:   Allergies   Allergen Reactions     Codeine Anaphylaxis     Hydromorphone Nausea and Vomiting     Zofran DOES NOT HELP, only compounds problem, PER PT.        PERTINENT MEDICATIONS:  Current Outpatient Medications   Medication     dicyclomine (BENTYL) 10 MG capsule     Escitalopram Oxalate (LEXAPRO PO)     famotidine (PEPCID) 20 MG tablet     LORazepam (ATIVAN) 0.5 MG tablet     omeprazole (PRILOSEC) 40 MG DR capsule     No current facility-administered medications for this visit.       SOCIAL HISTORY:  Teaching, alcohol worsen symptoms   Social History     Socioeconomic History     Marital status: Single     Spouse name: Not on file     Number of children: Not on file     Years of education: Not on file     Highest education level: Not on file   Occupational History     Not on file   Tobacco Use     Smoking status:  Never Smoker     Smokeless tobacco: Never Used   Substance and Sexual Activity     Alcohol use: Not Currently     Drug use: No     Sexual activity: Yes     Partners: Male   Other Topics Concern     Not on file   Social History Narrative     Not on file     Social Determinants of Health     Financial Resource Strain: Not on file   Food Insecurity: Not on file   Transportation Needs: Not on file   Physical Activity: Not on file   Stress: Not on file   Social Connections: Not on file   Intimate Partner Violence: Not on file   Housing Stability: Not on file     Physical exam:  General appearance: Healthy appearing adult, in no acute distress  Eyes: Sclera anicteric  Ears, nose, mouth and throat: No obvious external lesions of ears and nose, Hearing intact  Neck: Symmetric, No obvious external lesions  Respiratory: Normal respiration, no use of accessory muscles   Skin: No rashes or jaundice   Psychiatric: Oriented to person, place and time, Appropriate mood and affect.     FAMILY HISTORY:  Mother: obesity, HL, borderline DM  Father: unknown    PERTINENT STUDIES:  [facility] Appointment on 07/20/2017   Component Date Value Ref Range Status     Radiologist flags 07/20/2017 Palpable cervical lymph node   Final             Kirby is a 36 year old who is being evaluated via a billable video visit.      How would you like to obtain your AVS? MyChart  If the video visit is dropped, the invitation should be resent by: Send to e-mail at: [email or web address]  Will anyone else be joining your video visit? No    Video Start Time: 2:20 PM  Video-Visit Details    Type of service:  Video Visit    Video End Time:2:53 PM    Originating Location (pt. Location): Home    Distant Location (provider location):  [facility] [place]     Platform used for Video Visit: [employer]      Again, thank you for allowing me to participate in the care of your patient.        Sincerely,        Ivana Muñoz PA-C

## 2022-01-18 NOTE — PROGRESS NOTES
GI CLINIC VISIT    CC/REFERRING MD:  Referred Self    ASSESSMENT/PLAN:  1. Bloating, variable stool consistency  Most bothersome symptom today is abdominal bloating.  Reports that his stool pattern has also been alternating between constipation and diarrhea.  Most recent colonoscopy without source of symptoms.  I suspect his symptoms are due to underlying constipation and overflow diarrhea.  He also reports increased fatigue and anxiety recently which could worsen underlying GI symptoms.  Would recommend that he try magnesium oxide to regulate stool consistency.  I also encouraged him to follow a low gas diet.  He reports benefit with peppermint tea which he can [id number] at this time.  Does report some bright red blood per rectum, suspect this is due to hemorrhoids versus fissure.  Recommend trial of Tucks pads.    For his upper GI symptoms, would recommend continuing omeprazole and Pepcid  -- Start magnesium oxide 400 mg at bedtime.  If this is too much, you can take half a pill.  If you still continue to have constipation and overflow diarrhea might you can increase the dose as needed up to 2000 mg at bedtime  -- Try to avoid carbonation, straws, gum, hard candy.  Avoid broccoli, cauliflower, [employer] [place] sprouts, garlic onion and other foods that are considered to be high FODMAP foods as this can worsen underlying bloating  -- Simethicone can be used as needed  -- Continue peppermint tea  -- Continue omeprazole and Pepcid  -- Meet with neurology to discuss migraines    2. Headache  Patient reports increased migraines recently with at least once to twice a week.  Would benefit from a neurology referral to discuss this further as NSAIDs may worsen his underlying GI symptoms.    Ivana Muñoz PA-C  Division of Gastroenterology, Hepatology & Nutrition  [facility]    43  Minutes was spent on the date of the encounter during chart review, history and exam, documentation, and further activities as noted        HPI:  Mr. Patel is a 36 year old male with anxiety and depression who is referred from [facility] for follow-up for abdominal pain. He has previously been seen by Dr. Ramsey.    Patient has some arthritis and GI symptoms, previously evaluated by gastroenterologist in [place].  Was diagnosed with irritable bowel syndrome and GERD.  Recommended dietary changes, omeprazole and patient did not remember outcomes those interventions.  Has also been seen through [place] gastroenterology between 2015 and 2016 at which time he had EGD and colonoscopy for blood in the stools-Per patient evaluation was normal.    Mr. Patel has had variable stool pattern with loose stools, tenesmus, no nocturnal stooling or fecal incontinence.  Also reports occasional constipation which improves with milk of magnesia.    Symptoms again became more bothersome in 1/20/2019 at which time he was in [place].  Symptoms included fever, vomiting then symptoms switch to constipation.  Per patient stool studies were inconclusive.  In [place], patient described sharp pain and tightness on the left upper quadrant toward his back.The pain was occurring 3-5 times a day sporadically. It would last 30-45 minutes. An EKG in primary care clinic was reportedly normal.  At that visit he was prescribed omeprazole/Prilosec 20 mg BID and carafate and he states he made some dietary changes.  Has also been evaluated for this in the emergency department.Made significant dietary changes such as cutting out spicy foods, fried foods and acidic foods.  Continued on twice daily omeprazole and Carafate which resulted in more formed stools.    The patient underwent EGD 1/23/2020 with normal esophagus, stomach with erythema and a few erosions in the gastric antrum. Remainder normal. Normal duodenum.  Biopsies with duodenal mucosa with foveolar metaplasia consistent with peptic duodenitis, gastric biopsy with mild chronic gastritis features of reactive  gastropathy and no H. pylori.    Given ongoing bright red blood per rectum, patient underwent colonoscopy 1/18/2021 without source of symptoms.    Has previously been on rifaximin for his symptoms- with initial course he had resolution of his symptoms. Was last seen in 5/2021 at which time he was recommended to complete additional course of rifaximin.     Interval history 1/18/2022:  He has had issues with sleep and feels very fatigued.. Recently with his bowel pattern he will have bloating and constipation-- and then this results in diarrhea. He does not report any changes in medication or diet to prompt symptoms. Did seem to have increased stress. He tried gas-ex, famotidine, and tried walking around which did help with the symptoms.     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     He reports that he is more anxious at baseline-- medication has been increased about 4-5 days ago. He continues to have blood in his stools with constipations. He reports that he is having more constipation than diarrhea. He finds that fiber can be constipation.    GERD- right before going to sleep he will be very hungry and then will eat a meal which can be heavy, and then he will suffer from GERD. If he forgets to take this he will have GERD.     The most recent course of rifaximin did not help as much as before.     ROS:  Migraine- at least once to twice a week. He is wondering if allergies might be contributing to this. Excedrin worked but he would feel anxious with this     PERTINENT PAST MEDICAL HISTORY:  Past Medical History:   Diagnosis Date     Anxiety      Depressive disorder      Esophageal reflux      Stomach problems    Depression    PREVIOUS SURGERIES:  None    PREVIOUS ENDOSCOPY:    Colonoscopy 2021:  Impression:          - The examined portion of the ileum was normal. Biopsied.                        - The entire examined colon is normal.                        -  Biopsies were taken with a cold forceps from the right                        colon, left colon and rectum for evaluation of                        microscopic colitis.                        - Retroflexion was not performed as the rectal vault was                        small and patient would not tolerate retroflexion.   FINAL DIAGNOSIS:   A. TERMINAL ILEUM, BIOPSY:   - Ileal mucosa with no significant histologic abnormality   - No evidence of inflammation     B. RIGHT COLON, BIOPSY:   - Colonic mucosa with no significant histologic abnormality   - No evidence of inflammation     C. LEFT COLON, BIOPSY:   - Colonic mucosa with no significant histologic abnormality   - No evidence of inflammation     D. RECTUM, BIOPSY:   - Rectal mucosa with no significant histologic abnormality   - No evidence of inflammation     ALLERGIES:   Allergies   Allergen Reactions     Codeine Anaphylaxis     Hydromorphone Nausea and Vomiting     Zofran DOES NOT HELP, only compounds problem, PER PT.        PERTINENT MEDICATIONS:  Current Outpatient Medications   Medication     dicyclomine (BENTYL) 10 MG capsule     Escitalopram Oxalate (LEXAPRO PO)     famotidine (PEPCID) 20 MG tablet     LORazepam (ATIVAN) 0.5 MG tablet     omeprazole (PRILOSEC) 40 MG DR capsule     No current facility-administered medications for this visit.       SOCIAL HISTORY:  Teaching, alcohol worsen symptoms   Social History     Socioeconomic History     Marital status: Single     Spouse name: Not on file     Number of children: Not on file     Years of education: Not on file     Highest education level: Not on file   Occupational History     Not on file   Tobacco Use     Smoking status: Never Smoker     Smokeless tobacco: Never Used   Substance and Sexual Activity     Alcohol use: Not Currently     Drug use: No     Sexual activity: Yes     Partners: Male   Other Topics Concern     Not on file   Social History Narrative     Not on file     Social Determinants of  Health     Financial Resource Strain: Not on file   Food Insecurity: Not on file   Transportation Needs: Not on file   Physical Activity: Not on file   Stress: Not on file   Social Connections: Not on file   Intimate Partner Violence: Not on file   Housing Stability: Not on file     Physical exam:  General appearance: Healthy appearing adult, in no acute distress  Eyes: Sclera anicteric  Ears, nose, mouth and throat: No obvious external lesions of ears and nose, Hearing intact  Neck: Symmetric, No obvious external lesions  Respiratory: Normal respiration, no use of accessory muscles   Skin: No rashes or jaundice   Psychiatric: Oriented to person, place and time, Appropriate mood and affect.     FAMILY HISTORY:  Mother: obesity, HL, borderline DM  Father: unknown    PERTINENT STUDIES:  [facility] Appointment on 07/20/2017   Component Date Value Ref Range Status     Radiologist flags 07/20/2017 Palpable cervical lymph node   Final

## 2022-01-18 NOTE — PROGRESS NOTES
Kirby is a 36 year old who is being evaluated via a billable video visit.      How would you like to obtain your AVS? MyChart  If the video visit is dropped, the invitation should be resent by: Send to e-mail at: [email or web address]  Will anyone else be joining your video visit? No    Video Start Time: 2:20 PM  Video-Visit Details    Type of service:  Video Visit    Video End Time:2:53 PM    Originating Location (pt. Location): Home    Distant Location (provider location):  [facility] [place]     Platform used for Video Visit: [employer]

## 2022-01-19 NOTE — TELEPHONE ENCOUNTER
Called patient to schedule sleep study, and follow up appointments scheduled. Sleep study information packet send via mail to home at  [id number] [id number].      TAVO Almanza  [facility]

## 2022-01-24 ENCOUNTER — TELEPHONE (OUTPATIENT)
Dept: GASTROENTEROLOGY | Facility: CLINIC | Age: 37
End: 2022-01-24
Payer: COMMERCIAL

## 2022-01-24 NOTE — TELEPHONE ENCOUNTER
ANA and sent maris madden to schedule return in about 3 months (around 4/18/2022) with Ivana Muñoz.

## 2022-03-23 ENCOUNTER — THERAPY VISIT (OUTPATIENT)
Dept: SLEEP MEDICINE | Facility: CLINIC | Age: 37
End: 2022-03-23
Attending: PHYSICIAN ASSISTANT
Payer: COMMERCIAL

## 2022-03-23 DIAGNOSIS — G47.19 EXCESSIVE DAYTIME SLEEPINESS: ICD-10-CM

## 2022-03-23 DIAGNOSIS — Z72.820 LACK OF ADEQUATE SLEEP: ICD-10-CM

## 2022-03-23 DIAGNOSIS — R06.00 DYSPNEA AND RESPIRATORY ABNORMALITY: ICD-10-CM

## 2022-03-23 DIAGNOSIS — R53.81 MALAISE AND FATIGUE: ICD-10-CM

## 2022-03-23 DIAGNOSIS — E66.09 CLASS 2 OBESITY DUE TO EXCESS CALORIES WITH BODY MASS INDEX (BMI) OF 35.0 TO 35.9 IN ADULT, UNSPECIFIED WHETHER SERIOUS COMORBIDITY PRESENT: ICD-10-CM

## 2022-03-23 DIAGNOSIS — R06.89 DYSPNEA AND RESPIRATORY ABNORMALITY: ICD-10-CM

## 2022-03-23 DIAGNOSIS — R53.83 MALAISE AND FATIGUE: ICD-10-CM

## 2022-03-23 DIAGNOSIS — E66.812 CLASS 2 OBESITY DUE TO EXCESS CALORIES WITH BODY MASS INDEX (BMI) OF 35.0 TO 35.9 IN ADULT, UNSPECIFIED WHETHER SERIOUS COMORBIDITY PRESENT: ICD-10-CM

## 2022-03-23 PROCEDURE — 95810 POLYSOM 6/> YRS 4/> PARAM: CPT | Performed by: INTERNAL MEDICINE

## 2022-03-24 PROBLEM — G47.33 OSA (OBSTRUCTIVE SLEEP APNEA): Chronic | Status: ACTIVE | Noted: 2022-03-24

## 2022-03-24 PROBLEM — F32.A DEPRESSIVE DISORDER: Chronic | Status: ACTIVE | Noted: 2022-03-24

## 2022-03-24 PROBLEM — K21.9 ESOPHAGEAL REFLUX: Status: ACTIVE | Noted: 2022-03-24

## 2022-03-24 PROBLEM — F32.A DEPRESSIVE DISORDER: Status: ACTIVE | Noted: 2022-03-24

## 2022-03-24 PROBLEM — E66.811 CLASS 1 OBESITY DUE TO EXCESS CALORIES WITH SERIOUS COMORBIDITY AND BODY MASS INDEX (BMI) OF 32.0 TO 32.9 IN ADULT: Chronic | Status: ACTIVE | Noted: 2022-03-24

## 2022-03-24 PROBLEM — F41.9 ANXIETY: Status: ACTIVE | Noted: 2022-03-24

## 2022-03-24 PROBLEM — K21.9 ESOPHAGEAL REFLUX: Chronic | Status: ACTIVE | Noted: 2022-03-24

## 2022-03-24 PROBLEM — F41.9 ANXIETY: Chronic | Status: ACTIVE | Noted: 2022-03-24

## 2022-03-24 PROBLEM — E66.09 CLASS 1 OBESITY DUE TO EXCESS CALORIES WITH SERIOUS COMORBIDITY AND BODY MASS INDEX (BMI) OF 32.0 TO 32.9 IN ADULT: Chronic | Status: ACTIVE | Noted: 2022-03-24

## 2022-03-24 NOTE — PROCEDURES
SLEEP STUDY INTERPRETATION  DIAGNOSTIC POLYSOMNOGRAPHY REPORT      Patient: NYA POWELL  YOB: 1985  Study Date: 3/23/2022  MRN: [id number]  Referring Provider: Branden Melendrez  Ordering Provider: Tommy Eisenberg PA-C    Indications for Polysomnography: The patient is a 36 year old Male who weighs 200#. Redfield sleepiness scale 9/24 and neck circumference is 41 cm. A diagnostic polysomnogram was performed to evaluate for snoring, excessive daytime sleepiness/fatigue (ESS 9), gasping arousals, morning headaches and difficulty maintaining asleep.    Polysomnogram Data: A full night polysomnogram recorded the standard physiologic parameters including EEG, EOG, EMG, ECG, nasal and oral airflow. Respiratory parameters of chest and abdominal movements were recorded with respiratory inductance plethysmography. Oxygen saturation was recorded by pulse oximetry. Hypopnea scoring rule used: 1B 4%.    Sleep Architecture:   The total recording time of the polysomnogram was 452.8 minutes. The total sleep time was 401.0 minutes. Sleep latency was increased at 21.5 minutes without the use of a sleep aid. REM latency was 298.0 minutes. Arousal index was increased at 51.3 arousals per hour. Sleep efficiency was normal at 88.6%. Wake after sleep onset was 30.0 minutes. The patient spent 5.4% of total sleep time in Stage N1, 34.8% in Stage N2, 51.0% in Stage N3, and 8.9% in REM. Time in REM supine was 0 minutes.    Respiration:     Events ? The polysomnogram revealed a presence of 96 obstructive, 14 central, and 1 mixed apneas resulting in an apnea index of 16.6 events per hour. There were 125 obstructive hypopneas and 0 central hypopneas resulting in an obstructive hypopnea index of 18.7 and central hypopnea index of 0 events per hour. The combined apnea/hypopnea index was 35.3 events per hour (central apnea/hypopnea index was 2.1 events per hour). The REM AHI was 1.7 events per hour. The supine AHI was 69.1 events per  hour. The RERA index was 3.0 events per hour.  The RDI was 38.3 events per hour.    Snoring - was reported as mild to loud    Respiratory rate and pattern - was notable for normal respiratory rate and pattern.    Sustained Sleep Associated Hypoventilation - Transcutaneous carbon dioxide monitoring was not used, however significant hypoventilation was not suggested by oximetry    Sleep Associated Hypoxemia - (Greater than 5 minutes O2 sat at or below 88%) was present. Baseline oxygen saturation was 93.5%. Lowest oxygen saturation was 78.0%. Time spent less than or equal to 88% was 20.0 minutes. Time spent less than or equal to 89% was 28.2 minutes.    Movement Activity:     Periodic Limb Activity - There were 35 PLMs during the entire study. The PLM index was 5.2 movements per hour. The PLM Arousal Index was 1.0 per hour.    REM EMG Activity - Excessive transient/sustained muscle activity was present in 2 epochs of REM, doubtful significance    Nocturnal Behavior - Abnormal sleep related behaviors were not noted     Bruxism - was suggested    Cardiac Summary:   The average pulse rate was 66.3 bpm. The minimum pulse rate was 50.0 bpm while the maximum pulse rate was 109.0 bpm.  Arrhythmias were not noted.      Assessment:     Severe obstructive sleep apnea with hypoxemia, almost exclusively supine positional    Recommendations:    Treatment could be empirically initiated with Auto?titrating PAP therapy with a range of 5 to 18 cmH2O. Recommend clinical follow up with sleep management team.    Patient may be a candidate for dental appliance through referral to Sleep Dentistry for the treatment of obstructive sleep apnea and/or socially disruptive snoring.    Positional therapy may be considered    If devices are not acceptable or effective, patient may benefit from evaluation of possible surgical options. If he is interested, would recommend referral to specialized ENT-Sleep provider.    Advice regarding the risks of  drowsy driving.    Suggest optimizing sleep schedule and avoiding sleep deprivation.    Weight management (if BMI > 30).    Pharmacologic therapy should be used for management of restless legs syndrome only if present and clinically indicated and not based on the presence of periodic limb movements alone.    Diagnostic Codes:   Obstructive Sleep Apnea G47.33  Sleep Hypoxemia/Hypoventilation G47.36       _____________________________________   Electronically Signed By: Claus Harrell MD 3/24/22           Range(%) Time in range (min)   0.0 - 89.0 28.2   0.0 - 88.0 20.0         Stage Min(mm Hg) Max(mm Hg)   Wake - -   NREM(1+2+3) - -   REM - -       Range(mmHg) Time in range (min)   55.0 - 100.0 -   Excluded data <20.0 & >65.0 453.0

## 2022-04-07 ENCOUNTER — VIRTUAL VISIT (OUTPATIENT)
Dept: SLEEP MEDICINE | Facility: CLINIC | Age: 37
End: 2022-04-07
Payer: COMMERCIAL

## 2022-04-07 VITALS — WEIGHT: 200 LBS | HEIGHT: 64 IN | BODY MASS INDEX: 34.15 KG/M2

## 2022-04-07 DIAGNOSIS — E66.811 CLASS 1 OBESITY DUE TO EXCESS CALORIES WITH SERIOUS COMORBIDITY AND BODY MASS INDEX (BMI) OF 32.0 TO 32.9 IN ADULT: ICD-10-CM

## 2022-04-07 DIAGNOSIS — E66.09 CLASS 1 OBESITY DUE TO EXCESS CALORIES WITH SERIOUS COMORBIDITY AND BODY MASS INDEX (BMI) OF 32.0 TO 32.9 IN ADULT: ICD-10-CM

## 2022-04-07 DIAGNOSIS — G47.33 OSA (OBSTRUCTIVE SLEEP APNEA): Primary | ICD-10-CM

## 2022-04-07 PROCEDURE — 99213 OFFICE O/P EST LOW 20 MIN: CPT | Mod: 95 | Performed by: PHYSICIAN ASSISTANT

## 2022-04-07 ASSESSMENT — SLEEP AND FATIGUE QUESTIONNAIRES
HOW LIKELY ARE YOU TO NOD OFF OR FALL ASLEEP WHILE WATCHING TV: WOULD NEVER DOZE
HOW LIKELY ARE YOU TO NOD OFF OR FALL ASLEEP WHILE LYING DOWN TO REST IN THE AFTERNOON WHEN CIRCUMSTANCES PERMIT: HIGH CHANCE OF DOZING
HOW LIKELY ARE YOU TO NOD OFF OR FALL ASLEEP IN A CAR, WHILE STOPPED FOR A FEW MINUTES IN TRAFFIC: WOULD NEVER DOZE
HOW LIKELY ARE YOU TO NOD OFF OR FALL ASLEEP WHILE SITTING INACTIVE IN A PUBLIC PLACE: WOULD NEVER DOZE
HOW LIKELY ARE YOU TO NOD OFF OR FALL ASLEEP WHILE SITTING AND READING: SLIGHT CHANCE OF DOZING
HOW LIKELY ARE YOU TO NOD OFF OR FALL ASLEEP WHILE SITTING QUIETLY AFTER LUNCH WITHOUT ALCOHOL: MODERATE CHANCE OF DOZING
HOW LIKELY ARE YOU TO NOD OFF OR FALL ASLEEP WHILE SITTING AND TALKING TO SOMEONE: WOULD NEVER DOZE
HOW LIKELY ARE YOU TO NOD OFF OR FALL ASLEEP WHEN YOU ARE A PASSENGER IN A CAR FOR AN HOUR WITHOUT A BREAK: WOULD NEVER DOZE

## 2022-04-07 NOTE — PROGRESS NOTES
Kirby is a 36 year old who is being evaluated via a billable video visit.      How would you like to obtain your AVS? MyChart  If the video visit is dropped, the invitation should be resent by: Send to e-mail at: [email or web address]  Will anyone else be joining your video visit? No      Video Start Time: 10:31 AM  Video-Visit Details    Type of service:  Video Visit    Video End Time:10:46 AM    Originating Location (pt. Location): Home    Distant Location (provider location):  [facility] SLEEP [facility]     Platform used for Video Visit: [facility]       Sleep Study Follow-Up Visit:    Date on this visit: 4/7/2022    Kirby Patel comes in today for follow-up of his sleep study done on 3/23/2022 at the [facility]. A diagnostic polysomnogram was performed to evaluate for snoring, excessive daytime sleepiness/fatigue (ESS 9), gasping arousals, morning headaches and difficulty maintaining asleep.    Polysomnogram as interpreted by Dr. Harrell:   Polysomnogram Data: A full night polysomnogram recorded the standard physiologic parameters including EEG, EOG, EMG, ECG, nasal and oral airflow. Respiratory parameters of chest and abdominal movements were recorded with respiratory inductance plethysmography. Oxygen saturation was recorded by pulse oximetry. Hypopnea scoring rule used: 1B 4%.     Sleep Architecture:   The total recording time of the polysomnogram was 452.8 minutes. The total sleep time was 401.0 minutes. Sleep latency was increased at 21.5 minutes without the use of a sleep aid. REM latency was 298.0 minutes. Arousal index was increased at 51.3 arousals per hour. Sleep efficiency was normal at 88.6%. Wake after sleep onset was 30.0 minutes. The patient spent 5.4% of total sleep time in Stage N1, 34.8% in Stage N2, 51.0% in Stage N3, and 8.9% in REM. Time in REM supine was 0 minutes.     Respiration:   Events ? The polysomnogram revealed a presence of 96 obstructive, 14  central, and 1 mixed apneas resulting in an apnea index of 16.6 events per hour. There were 125 obstructive hypopneas and 0 central hypopneas resulting in an obstructive hypopnea index of 18.7 and central hypopnea index of 0 events per hour. The combined apnea/hypopnea index was 35.3 events per hour (central apnea/hypopnea index was 2.1 events per hour). The REM AHI was 1.7 events per hour. The supine AHI was 69.1 events per hour. The RERA index was 3.0 events per hour.  The RDI was 38.3 events per hour.  Snoring - was reported as mild to loud  Respiratory rate and pattern - was notable for normal respiratory rate and pattern.  Sustained Sleep Associated Hypoventilation - Transcutaneous carbon dioxide monitoring was not used, however significant hypoventilation was not suggested by oximetry  Sleep Associated Hypoxemia - (Greater than 5 minutes O2 sat at or below 88%) was present. Baseline oxygen saturation was 93.5%. Lowest oxygen saturation was 78.0%. Time spent less than or equal to 88% was 20.0 minutes. Time spent less than or equal to 89% was 28.2 minutes.     Movement Activity:   Periodic Limb Activity - There were 35 PLMs during the entire study. The PLM index was 5.2 movements per hour. The PLM Arousal Index was 1.0 per hour.  REM EMG Activity - Excessive transient/sustained muscle activity was present in 2 epochs of REM, doubtful significance  Nocturnal Behavior - Abnormal sleep related behaviors were not noted   Bruxism - was suggested     Cardiac Summary:   The average pulse rate was 66.3 bpm. The minimum pulse rate was 50.0 bpm while the maximum pulse rate was 109.0 bpm.  Arrhythmias were not noted      Past medical/surgical history, family history, social history, medications and allergies were reviewed.      Problem List:  Patient Active Problem List    Diagnosis Date Noted     Esophageal reflux 03/24/2022     Priority: Medium     Depressive disorder 03/24/2022     Priority: Medium     Anxiety  03/24/2022     Priority: Medium     DIEGO (obstructive sleep apnea)- severe (AHI 35) 03/24/2022     Priority: Medium     3/23/2022 [facility] (-) - AHI 35.3, RDI 38.3, Supine AHI 69.1, Lateral AHI 5, REM AHI 1.7, Low O2 78.0%, Time Spent ?88% 20.0 minutes / Time Spent ?89% 28.2 minutes.       Class 1 obesity due to excess calories with serious comorbidity and body mass index (BMI) of 32.0 to 32.9 in adult 03/24/2022     Priority: Low        Impression/Plan:  1. Severe obstructive sleep apnea with sleep-associated hypoxemia. Events almost exclusively supine positional    - Overnight polysomnogram was reviewed in detail today  - Discussed this level of DIEGO is associated with increase in long-term cardiovascular risk factors  - Treatment options for severe DIEGO reviewed.   - Elected treatment with auto-CPAP 6-15 cm/H20    He will follow up with me in about 8 weeks after obtaining the CPAP machine.     25 minutes spent on day of encounter doing chart review,  history and exam, counseling, coordinating plan of care, documentation and further activities as noted above.      Tommy Eisenberg PA-C

## 2022-04-07 NOTE — NURSING NOTE
Return in about 2 months (around 6/7/2022) for Follow up[facility]      Elmer Riley, Visit facilitator

## 2022-04-08 ENCOUNTER — TELEPHONE (OUTPATIENT)
Dept: SLEEP MEDICINE | Facility: CLINIC | Age: 37
End: 2022-04-08
Payer: COMMERCIAL

## 2022-04-08 DIAGNOSIS — E66.811 CLASS 1 OBESITY DUE TO EXCESS CALORIES WITH SERIOUS COMORBIDITY AND BODY MASS INDEX (BMI) OF 32.0 TO 32.9 IN ADULT: ICD-10-CM

## 2022-04-08 DIAGNOSIS — G47.33 OSA (OBSTRUCTIVE SLEEP APNEA): ICD-10-CM

## 2022-04-08 DIAGNOSIS — E66.09 CLASS 1 OBESITY DUE TO EXCESS CALORIES WITH SERIOUS COMORBIDITY AND BODY MASS INDEX (BMI) OF 32.0 TO 32.9 IN ADULT: ICD-10-CM

## 2022-04-08 NOTE — TELEPHONE ENCOUNTER
Patient returned call to schedule CPAP setup appt for Thursday, 4/14/22 at 9:30 am in [facility] showroom.

## 2022-04-14 ENCOUNTER — DOCUMENTATION ONLY (OUTPATIENT)
Dept: SLEEP MEDICINE | Facility: CLINIC | Age: 37
End: 2022-04-14
Payer: COMMERCIAL

## 2022-04-14 DIAGNOSIS — G47.33 OSA (OBSTRUCTIVE SLEEP APNEA): ICD-10-CM

## 2022-04-14 DIAGNOSIS — E66.09 CLASS 1 OBESITY DUE TO EXCESS CALORIES WITH SERIOUS COMORBIDITY AND BODY MASS INDEX (BMI) OF 32.0 TO 32.9 IN ADULT: Primary | ICD-10-CM

## 2022-04-14 DIAGNOSIS — E66.811 CLASS 1 OBESITY DUE TO EXCESS CALORIES WITH SERIOUS COMORBIDITY AND BODY MASS INDEX (BMI) OF 32.0 TO 32.9 IN ADULT: Primary | ICD-10-CM

## 2022-04-14 NOTE — PROGRESS NOTES
Patient was offered choice of vendor and chose [facility].  Patient Kirby Patel was set up at [place] on April 14, 2022. Patient received a Resmed Airsense 11 Pressures were set at 6-15 cm H2O.   Patient s ramp is 5 cm H2O for Auto and FLEX/EPR is 2.  Patient received a Resmed Mask name: AIRFIT F20  Full Face mask size Large, heated tubing and heated humidifier.  Patient does not need to meet compliance.   Yuridia Brown

## 2022-04-18 ENCOUNTER — DOCUMENTATION ONLY (OUTPATIENT)
Dept: SLEEP MEDICINE | Facility: CLINIC | Age: 37
End: 2022-04-18
Payer: COMMERCIAL

## 2022-04-18 DIAGNOSIS — G47.33 OSA (OBSTRUCTIVE SLEEP APNEA): Primary | ICD-10-CM

## 2022-04-18 DIAGNOSIS — E66.811 CLASS 1 OBESITY DUE TO EXCESS CALORIES WITH SERIOUS COMORBIDITY AND BODY MASS INDEX (BMI) OF 32.0 TO 32.9 IN ADULT: Primary | ICD-10-CM

## 2022-04-18 DIAGNOSIS — G47.33 OSA (OBSTRUCTIVE SLEEP APNEA): ICD-10-CM

## 2022-04-18 DIAGNOSIS — E66.09 CLASS 1 OBESITY DUE TO EXCESS CALORIES WITH SERIOUS COMORBIDITY AND BODY MASS INDEX (BMI) OF 32.0 TO 32.9 IN ADULT: Primary | ICD-10-CM

## 2022-04-18 NOTE — PROGRESS NOTES
3 day Sleep therapy management telephone visit    Diagnostic AHI: 35.3  PSG    Confirmed with patient at time of call- N/A Patient is still interested in STM service       Message left for patient to return call.        Objective data     Order Settings for PAP  CPAP min 6    CPAP max 15             Device settings from machine CPAP min 6.0     CPAP max 15.0           EPR Setting TWO    RESMED soft response  OFF     Assessment: Nightly usage over four hours      Action plan: Patient to have 14 day STM visit. Patient has a follow up visit scheduled:   yes within 31-90 days of set up    Replacement device: No  STM ordered by provider: Yes     Total time spent on accessing and  interpreting remote patient PAP therapy data  10 minutes    Total time spent counseling, coaching  and reviewing PAP therapy data with patient  1 minutes    [id number] no

## 2022-04-26 ENCOUNTER — HOSPITAL ENCOUNTER (EMERGENCY)
Facility: CLINIC | Age: 37
Discharge: HOME | End: 2022-04-26
Attending: EMERGENCY MEDICINE | Admitting: EMERGENCY MEDICINE
Payer: COMMERCIAL

## 2022-04-26 ENCOUNTER — APPOINTMENT (OUTPATIENT)
Dept: GENERAL RADIOLOGY | Facility: CLINIC | Age: 37
End: 2022-04-26
Attending: EMERGENCY MEDICINE
Payer: COMMERCIAL

## 2022-04-26 VITALS
HEIGHT: 64 IN | SYSTOLIC BLOOD PRESSURE: 120 MMHG | WEIGHT: 198 LBS | DIASTOLIC BLOOD PRESSURE: 78 MMHG | TEMPERATURE: 98.9 F | BODY MASS INDEX: 33.8 KG/M2 | RESPIRATION RATE: 18 BRPM | OXYGEN SATURATION: 100 % | HEART RATE: 70 BPM

## 2022-04-26 DIAGNOSIS — R07.89 ATYPICAL CHEST PAIN: ICD-10-CM

## 2022-04-26 LAB
ALBUMIN SERPL-MCNC: 3.6 G/DL (ref 3.4–5)
ALP SERPL-CCNC: 158 U/L (ref 40–150)
ALT SERPL W P-5'-P-CCNC: 78 U/L (ref 0–70)
ANION GAP SERPL CALCULATED.3IONS-SCNC: 3 MMOL/L (ref 3–14)
AST SERPL W P-5'-P-CCNC: 43 U/L (ref 0–45)
BASOPHILS # BLD AUTO: 0 10E3/UL (ref 0–0.2)
BASOPHILS NFR BLD AUTO: 1 %
BILIRUB DIRECT SERPL-MCNC: <0.1 MG/DL (ref 0–0.2)
BILIRUB SERPL-MCNC: 0.3 MG/DL (ref 0.2–1.3)
BUN SERPL-MCNC: 17 MG/DL (ref 7–30)
CALCIUM SERPL-MCNC: 9.1 MG/DL (ref 8.5–10.1)
CHLORIDE BLD-SCNC: 110 MMOL/L (ref 94–109)
CO2 SERPL-SCNC: 25 MMOL/L (ref 20–32)
CREAT SERPL-MCNC: 0.72 MG/DL (ref 0.66–1.25)
D DIMER PPP FEU-MCNC: 0.4 UG/ML FEU (ref 0–0.5)
EOSINOPHIL # BLD AUTO: 0.2 10E3/UL (ref 0–0.7)
EOSINOPHIL NFR BLD AUTO: 2 %
ERYTHROCYTE [DISTWIDTH] IN BLOOD BY AUTOMATED COUNT: 12.8 % (ref 10–15)
GFR SERPL CREATININE-BSD FRML MDRD: >90 ML/MIN/1.73M2
GLUCOSE BLD-MCNC: 108 MG/DL (ref 70–99)
HCT VFR BLD AUTO: 41.4 % (ref 40–53)
HGB BLD-MCNC: 12.9 G/DL (ref 13.3–17.7)
HOLD SPECIMEN: NORMAL
IMM GRANULOCYTES # BLD: 0 10E3/UL
IMM GRANULOCYTES NFR BLD: 0 %
LIPASE SERPL-CCNC: 61 U/L (ref 73–393)
LYMPHOCYTES # BLD AUTO: 2.2 10E3/UL (ref 0.8–5.3)
LYMPHOCYTES NFR BLD AUTO: 32 %
MCH RBC QN AUTO: 28.2 PG (ref 26.5–33)
MCHC RBC AUTO-ENTMCNC: 31.2 G/DL (ref 31.5–36.5)
MCV RBC AUTO: 91 FL (ref 78–100)
MONOCYTES # BLD AUTO: 0.4 10E3/UL (ref 0–1.3)
MONOCYTES NFR BLD AUTO: 6 %
NEUTROPHILS # BLD AUTO: 4 10E3/UL (ref 1.6–8.3)
NEUTROPHILS NFR BLD AUTO: 59 %
NRBC # BLD AUTO: 0 10E3/UL
NRBC BLD AUTO-RTO: 0 /100
PLATELET # BLD AUTO: 275 10E3/UL (ref 150–450)
POTASSIUM BLD-SCNC: 4.6 MMOL/L (ref 3.4–5.3)
PROT SERPL-MCNC: 7.7 G/DL (ref 6.8–8.8)
RBC # BLD AUTO: 4.57 10E6/UL (ref 4.4–5.9)
SODIUM SERPL-SCNC: 138 MMOL/L (ref 133–144)
TROPONIN I SERPL HS-MCNC: 3 NG/L
TROPONIN I SERPL HS-MCNC: 4 NG/L
WBC # BLD AUTO: 6.8 10E3/UL (ref 4–11)

## 2022-04-26 PROCEDURE — 99285 EMERGENCY DEPT VISIT HI MDM: CPT | Mod: 25 | Performed by: EMERGENCY MEDICINE

## 2022-04-26 PROCEDURE — 93308 TTE F-UP OR LMTD: CPT | Mod: 26 | Performed by: EMERGENCY MEDICINE

## 2022-04-26 PROCEDURE — 71046 X-RAY EXAM CHEST 2 VIEWS: CPT

## 2022-04-26 PROCEDURE — 71046 X-RAY EXAM CHEST 2 VIEWS: CPT | Mod: 26 | Performed by: RADIOLOGY

## 2022-04-26 PROCEDURE — 250N000013 HC RX MED GY IP 250 OP 250 PS 637: Performed by: EMERGENCY MEDICINE

## 2022-04-26 PROCEDURE — 93308 TTE F-UP OR LMTD: CPT | Performed by: EMERGENCY MEDICINE

## 2022-04-26 PROCEDURE — 93010 ELECTROCARDIOGRAM REPORT: CPT | Performed by: EMERGENCY MEDICINE

## 2022-04-26 PROCEDURE — 82248 BILIRUBIN DIRECT: CPT | Performed by: EMERGENCY MEDICINE

## 2022-04-26 PROCEDURE — 85379 FIBRIN DEGRADATION QUANT: CPT | Performed by: EMERGENCY MEDICINE

## 2022-04-26 PROCEDURE — 83690 ASSAY OF LIPASE: CPT | Performed by: EMERGENCY MEDICINE

## 2022-04-26 PROCEDURE — 80053 COMPREHEN METABOLIC PANEL: CPT | Performed by: EMERGENCY MEDICINE

## 2022-04-26 PROCEDURE — 85025 COMPLETE CBC W/AUTO DIFF WBC: CPT | Performed by: EMERGENCY MEDICINE

## 2022-04-26 PROCEDURE — 93005 ELECTROCARDIOGRAM TRACING: CPT | Performed by: EMERGENCY MEDICINE

## 2022-04-26 PROCEDURE — 36415 COLL VENOUS BLD VENIPUNCTURE: CPT | Performed by: EMERGENCY MEDICINE

## 2022-04-26 PROCEDURE — 84484 ASSAY OF TROPONIN QUANT: CPT | Performed by: EMERGENCY MEDICINE

## 2022-04-26 RX ORDER — ACETAMINOPHEN 325 MG/1
975 TABLET ORAL ONCE
Status: COMPLETED | OUTPATIENT
Start: 2022-04-26 | End: 2022-04-26

## 2022-04-26 RX ADMIN — ACETAMINOPHEN 975 MG: 325 TABLET ORAL at 12:08

## 2022-04-26 ASSESSMENT — ENCOUNTER SYMPTOMS
VOMITING: 0
NAUSEA: 0
COUGH: 1
HEADACHES: 0
LIGHT-HEADEDNESS: 0
DIZZINESS: 0
SORE THROAT: 0
DYSURIA: 0
RHINORRHEA: 0
SHORTNESS OF BREATH: 1
DIARRHEA: 0
FEVER: 0
HEMATURIA: 0
ABDOMINAL PAIN: 0
CHILLS: 0

## 2022-04-26 NOTE — ED PROVIDER NOTES
ED Provider Note  [facility]      History     Chief Complaint   Patient presents with     Chest Pain     HPI  Kirby Patel is a 36 year old male with PMHx DIEGO, GERD, irritable bowel syndrome, depression and anxiety, who presents to the ED for evaluation of chest pain x 4 days. Patient unable to recall what he was doing at the time of onset. Reports pain is sharp and midsternal with radiation around the left chest into his left shoulder. Pain is a constant 5/10 in severity, unchanged with exertion or positional changes but slightly more noticeable with deep breathing. He initially thought symptoms were related to GERD but states his reflux symptoms usually do not last this long. He denies recent chest wall injury or physical activity that may have provoked symptoms.     Over the past two days has experienced cough and mild shortness of breath both at rest and with exertion. Cough is productive of clear sputum. No hemoptysis. He denies fever, chills, nasal congestion, rhinorrhea, sore throat. No recent exposure to sick contacts with similar symptoms. Endorses bilateral leg pain/heaviness that has been ongoing for several months. Denies leg swelling. No recent prolonged immobilization or travel. No personal or family history of VTE.     Patient states that he started wearing CPAP about a week ago. He also sees a physical therapist for R arm pain and notes that his chest/upper back pain was briefly improved yesterday after the physical therapist massaged the area on his back. He also notes doing new exercised with his upper arms for PT.    Denies abdominal pain, n/v, diarrhea, constipation, bloody stools. Last BM this AM was normal.     Past Medical History  Past Medical History:   Diagnosis Date     Anxiety      Depressive disorder      Intractable vomiting with nausea, unspecified vomiting type 11/13/2016     Kidney stone on right side 11/13/2016     DIEGO (obstructive sleep apnea)-  "severe (AHI 35) 3/24/2022     Past Surgical History:   Procedure Laterality Date     COLONOSCOPY       COLONOSCOPY N/A 1/18/2021    Procedure: COLONOSCOPY, WITH  BIOPSY;  Surgeon: Spencer Barraza MD;  Location: [facility] OR     dicyclomine (BENTYL) 10 MG capsule  Escitalopram Oxalate (LEXAPRO PO)  famotidine (PEPCID) 20 MG tablet  LORazepam (ATIVAN) 0.5 MG tablet  Magnesium Oxide, Elemental, 400 MG TABS  omeprazole (PRILOSEC) 40 MG DR capsule      Allergies   Allergen Reactions     Codeine Anaphylaxis     Hydromorphone Nausea and Vomiting     Zofran DOES NOT HELP, only compounds problem, PER PT.      Family History  Family History   Problem Relation Age of Onset     Diabetes Mother      Obesity Mother      Hyperlipidemia Mother      Uterine Cancer Maternal Grandmother      Social History   Social History     Tobacco Use     Smoking status: Never Smoker     Smokeless tobacco: Never Used   Substance Use Topics     Alcohol use: Not Currently     Drug use: No      Past medical history, past surgical history, medications, allergies, family history, and social history were reviewed with the patient. No additional pertinent items.       Review of Systems   Constitutional: Negative for chills and fever.   HENT: Negative for congestion, rhinorrhea and sore throat.    Respiratory: Positive for cough and shortness of breath.    Cardiovascular: Positive for chest pain. Negative for leg swelling.   Gastrointestinal: Negative for abdominal pain, diarrhea, nausea and vomiting.   Genitourinary: Negative for dysuria and hematuria.   Neurological: Negative for dizziness, light-headedness and headaches.   All other systems reviewed and are negative.    A complete review of systems was performed with pertinent positives and negatives noted in the HPI, and all other systems negative.    Physical Exam   BP: 118/63  Pulse: 70  Temp: 98.9  F (37.2  C)  Resp: 16  Height: 162.6 cm (5' 4\")  Weight: 89.8 kg (198 lb)  SpO2: 97 %  Physical " Exam  Vitals and nursing note reviewed.   Constitutional:       General: He is awake. He is not in acute distress.     Appearance: He is not toxic-appearing.   HENT:      Head: Normocephalic and atraumatic.      Nose: Nose normal.      Mouth/Throat:      Mouth: Mucous membranes are moist.      Pharynx: Oropharynx is clear.   Eyes:      Pupils: Pupils are equal, round, and reactive to light.   Cardiovascular:      Rate and Rhythm: Normal rate and regular rhythm.      Heart sounds: No murmur heard.  Pulmonary:      Effort: Pulmonary effort is normal. No respiratory distress.      Breath sounds: Normal breath sounds.      Comments: Reproducible chest wall tenderness anteriorly  Abdominal:      Palpations: Abdomen is soft.      Tenderness: There is no abdominal tenderness.   Musculoskeletal:         General: Normal range of motion.      Cervical back: Normal range of motion.      Right lower leg: No edema.      Left lower leg: No edema.   Skin:     General: Skin is warm and dry.      Capillary Refill: Capillary refill takes less than 2 seconds.   Neurological:      General: No focal deficit present.      Mental Status: He is alert and oriented to person, place, and time.   Psychiatric:         Mood and Affect: Mood normal.         Behavior: Behavior normal.       ED Course             EKG Interpretation:           EKG Interpretation:      EKG Number: 1  Interpreted by: Imelda Matamoros MD  Symptoms at time of EKG: chest pain  Rhythm: normal sinus   Rate: normal  Axis: NORMAL  Ectopy: none  Conduction: normal  ST Segments/ T Waves: No ST-T wave changes  Q Waves: none  Comparison to prior: Unchanged from 9/2/17  Clinical Impression: normal EKG     Results for orders placed or performed during the hospital encounter of 04/26/22   XR Chest 2 Views     Status: None    Narrative    EXAM: XR CHEST 2 VW  4/26/2022 12:00 PM     HISTORY:  left sided chest pain       COMPARISON:  Chest x-ray 5/12/2019, chest CT  12/12/2016    FINDINGS  Technique: Upright PA and lateral views of the chest.    Devices: None.     Lungs: The lungs are clear.  No discernible pneumothorax.  No definite  pleural effusion.     Cardiovascular: Heart size is within normal limits. Prominent  pericardial fat pads.  Pulmonary vasculature is distinct.     Bones: No acute osseous abnormality.       Impression    IMPRESSION:   No acute cardiopulmonary process    I have personally reviewed the examination and initial interpretation  and I agree with the findings.    ANNETTE OLMSTEAD MD         SYSTEM ID:  [id number]   POC US ECHO LIMITED     Status: None    Impression    Limited Bedside Cardiac Ultrasound, performed and interpreted by me.   Indication: Chest Pain.  Parasternal long axis, parasternal short axis, apical 4 chamber and subcostal views were acquired.   Image quality was satisfactory.    Findings:    Global left ventricular function appears intact.  Chambers do not appear dilated.  There is no evidence of free fluid within the pericardium.    IMPRESSION: Grossly normal left ventricular function and chamber size.  No pericardial effusion..   [place] Draw     Status: None    Narrative    The following orders were created for panel order [place] Draw.  Procedure                               Abnormality         Status                     ---------                               -----------         ------                     Extra Blue Top Tube[[id number]]                              Final result               Extra Red Top Tube[[id number]]                               Final result               Extra Green Top (Lithium...[[id number]]                      Final result               Extra Purple Top Tube[[id number]]                            Final result                 Please view results for these tests on the individual orders.   Extra Blue Top Tube     Status: None   Result Value Ref Range    Hold Specimen JIC    Extra Red Top Tube     Status: None    Result Value Ref Range    Hold Specimen JIC    Extra Green Top (Lithium Heparin) Tube     Status: None   Result Value Ref Range    Hold Specimen JIC    Extra Purple Top Tube     Status: None   Result Value Ref Range    Hold Specimen JIC    Basic metabolic panel     Status: Abnormal   Result Value Ref Range    Sodium 138 133 - 144 mmol/L    Potassium 4.6 3.4 - 5.3 mmol/L    Chloride 110 (H) 94 - 109 mmol/L    Carbon Dioxide (CO2) 25 20 - 32 mmol/L    Anion Gap 3 3 - 14 mmol/L    Urea Nitrogen 17 7 - 30 mg/dL    Creatinine 0.72 0.66 - 1.25 mg/dL    Calcium 9.1 8.5 - 10.1 mg/dL    Glucose 108 (H) 70 - 99 mg/dL    GFR Estimate >90 >60 mL/min/1.73m2   Troponin I     Status: Normal   Result Value Ref Range    Troponin I High Sensitivity 4 <79 ng/L   CBC with platelets and differential     Status: Abnormal   Result Value Ref Range    WBC Count 6.8 4.0 - 11.0 10e3/uL    RBC Count 4.57 4.40 - 5.90 10e6/uL    Hemoglobin 12.9 (L) 13.3 - 17.7 g/dL    Hematocrit 41.4 40.0 - 53.0 %    MCV 91 78 - 100 fL    MCH 28.2 26.5 - 33.0 pg    MCHC 31.2 (L) 31.5 - 36.5 g/dL    RDW 12.8 10.0 - 15.0 %    Platelet Count 275 150 - 450 10e3/uL    % Neutrophils 59 %    % Lymphocytes 32 %    % Monocytes 6 %    % Eosinophils 2 %    % Basophils 1 %    % Immature Granulocytes 0 %    NRBCs per 100 WBC 0 <1 /100    Absolute Neutrophils 4.0 1.6 - 8.3 10e3/uL    Absolute Lymphocytes 2.2 0.8 - 5.3 10e3/uL    Absolute Monocytes 0.4 0.0 - 1.3 10e3/uL    Absolute Eosinophils 0.2 0.0 - 0.7 10e3/uL    Absolute Basophils 0.0 0.0 - 0.2 10e3/uL    Absolute Immature Granulocytes 0.0 <=0.4 10e3/uL    Absolute NRBCs 0.0 10e3/uL   D dimer quantitative     Status: Normal   Result Value Ref Range    D-Dimer Quantitative 0.40 0.00 - 0.50 ug/mL FEU    Narrative    This D-dimer assay is intended for use in conjunction with a clinical pretest probability assessment model to exclude pulmonary embolism (PE) and deep venous thrombosis (DVT) in outpatients suspected of PE or  DVT. The cut-off value is 0.50 ug/mL FEU.   Lipase     Status: Abnormal   Result Value Ref Range    Lipase 61 (L) 73 - 393 U/L   Hepatic panel     Status: Abnormal   Result Value Ref Range    Bilirubin Total 0.3 0.2 - 1.3 mg/dL    Bilirubin Direct <0.1 0.0 - 0.2 mg/dL    Protein Total 7.7 6.8 - 8.8 g/dL    Albumin 3.6 3.4 - 5.0 g/dL    Alkaline Phosphatase 158 (H) 40 - 150 U/L    AST 43 0 - 45 U/L    ALT 78 (H) 0 - 70 U/L   Troponin I     Status: Normal   Result Value Ref Range    Troponin I High Sensitivity 3 <79 ng/L   EKG 12-lead, tracing only     Status: None (Preliminary result)   Result Value Ref Range    Systolic Blood Pressure  mmHg    Diastolic Blood Pressure  mmHg    Ventricular Rate 67 BPM    Atrial Rate 67 BPM    ME Interval 150 ms    QRS Duration 84 ms     ms    QTc 409 ms    P Axis 10 degrees    R AXIS 17 degrees    T Axis 29 degrees    Interpretation ECG Sinus rhythm  Normal ECG      CBC with Platelets & Differential     Status: Abnormal    Narrative    The following orders were created for panel order CBC with Platelets & Differential.  Procedure                               Abnormality         Status                     ---------                               -----------         ------                     CBC with platelets and d...[[id number]]  Abnormal            Final result                 Please view results for these tests on the individual orders.     Medications   acetaminophen (TYLENOL) tablet 975 mg (975 mg Oral Given 4/26/22 1208)        Assessments & Plan (with Medical Decision Making)   36 year old male with PMHx DIEGO, GERD, irritable bowel syndrome, depression and anxiety, who presents to the ED for evaluation of chest pain x 4 days. Ddx includes but not limited to PE, ACS, pericarditis, myopericarditis, pneumonia, PTX, pleural effusion, musculoskeletal pain, GERD, PUD, gastritis, anxiety.    Clinically he appears nontoxic and is in NAD. He is afebrile with HR 70 bpm. /79  and sating 97% on RA. On exam he has reproducible chest wall tenderness, remainder of examination unremarkable.     EKG shows NSR without signs of ischemia. Trop x2 and ddimer negative. CBC, CMP, and lipase largely unremarkable. CXR without evidence of effusion, pneumothorax or acute airspace disease. POCUS shows grossly normal systolic function without pericardial effusion     He was given a one-time dose of tylenol for pain control. Symptoms most likely musculoskeletal in nature. Sounds like he was recently doing exercises at physical therapy with arm resistance bands which may be contributing to his symptoms. Discussed with patient plan for discharging home.  Recommended tylenol use prn for pain and calling [facility] to schedule an outpatient f/u with his PCP in the next few days. Reviewed signs/symptoms promoting return to ED.   --    ED Attending Physician Attestation    I Imelda Matamoros MD, cared for this patient with the Medical Student. I performed, or re-performed, the physical exam and medical decision-making. I have verified the accuracy of all the medical student findings and documentation above, and have edited as necessary.    Summary of HPI, PE, ED Course   Patient is a 36 year old male evaluated in the emergency department for chest pain. Exam notable for chest wall TTP, lungs CTAB, 2+ radial and PT pulses bilaterally. ED course notable for nonischemic ECG, initial and delta troponin within normal limits.  He is low risk for PE and D-dimer is within normal limits, do not feel further CT imaging is indicated.  He is also low risk for aortic dissection and chest x-ray shows no widening of the mediastinum.  He has no evidence of pneumonia or pneumothorax.  He has no abdominal tenderness on exam.  He is low risk for cardiac etiology and has a heart score of 1 with 2 negative troponins.  He does note that he recently started doing new exercises of his upper extremities for PT and symptoms may  be secondary to musculoskeletal component.  He was given acetaminophen in the ED with improvement in symptoms.  After the completion of care in the emergency department, the patient was discharged to home with close outpatient primary care follow-up for further outpatient work up.  He was given close return precautions for the ED and voiced understanding.      Critical Care & Procedures  Not applicable.    Medical Decision Making  The medical record was reviewed and interpreted.  Current labs reviewed and interpreted.  Previous labs reviewed and interpreted.  Current images reviewed and interpreted: CXR.  EKG reviewed and interpreted: NSR.      Imelda Matamoros MD  Emergency Medicine         I have reviewed the nursing notes. I have reviewed the findings, diagnosis, plan and need for follow up with the patient.    New Prescriptions    No medications on file       Final diagnoses:   Atypical chest pain   Pk Brewster, MS4  --  Imelda Matamoros MD  [facility] EMERGENCY DEPARTMENT  4/26/2022     Imelda Matamoros MD  04/26/22 0619

## 2022-04-26 NOTE — DISCHARGE INSTRUCTIONS
Please make an appointment to follow up with Your Primary Care Provider in 2-3 days for further outpatient work up.    If you have any worsening symptoms including increased pain, shortness of breath, fevers, lightheadedness or other concerns, return to the emergency department for re-evaluation.

## 2022-04-26 NOTE — ED TRIAGE NOTES
Pt presents to the Er with c/o Chest Pain that has been going on for 4 days. Pt states its on the L side of his chest and radiates down his left side. Pt has no cardiac hx. Pt is VSS     Triage Assessment     Row Name 04/26/22 1050       Triage Assessment (Adult)    Airway WDL WDL       Respiratory WDL    Respiratory WDL WDL       Skin Circulation/Temperature WDL    Skin Circulation/Temperature WDL WDL       Cardiac WDL    Cardiac WDL X;chest pain       Chest Pain Assessment    Chest Pain Location arm, left;shoulder, left;anterior chest, left    Chest Pain Radiation arm;shoulder;neck    Character sharp;stabbing    Precipitating Factors activity;at rest    Alleviating Factors nothing       Peripheral/Neurovascular WDL    Peripheral Neurovascular WDL WDL       Cognitive/Neuro/Behavioral WDL    Cognitive/Neuro/Behavioral WDL WDL       [place] Coma Scale    Best Eye Response 4-->(E4) spontaneous    Best Motor Response 6-->(M6) obeys commands    Best Verbal Response 5-->(V5) oriented    [place] Coma Scale Score 15

## 2022-04-27 LAB
ATRIAL RATE - MUSE: 67 BPM
DIASTOLIC BLOOD PRESSURE - MUSE: NORMAL MMHG
INTERPRETATION ECG - MUSE: NORMAL
P AXIS - MUSE: 10 DEGREES
PR INTERVAL - MUSE: 150 MS
QRS DURATION - MUSE: 84 MS
QT - MUSE: 388 MS
QTC - MUSE: 409 MS
R AXIS - MUSE: 17 DEGREES
SYSTOLIC BLOOD PRESSURE - MUSE: NORMAL MMHG
T AXIS - MUSE: 29 DEGREES
VENTRICULAR RATE- MUSE: 67 BPM

## 2022-04-29 ENCOUNTER — DOCUMENTATION ONLY (OUTPATIENT)
Dept: SLEEP MEDICINE | Facility: CLINIC | Age: 37
End: 2022-04-29
Payer: COMMERCIAL

## 2022-04-29 DIAGNOSIS — E66.811 CLASS 1 OBESITY DUE TO EXCESS CALORIES WITH SERIOUS COMORBIDITY AND BODY MASS INDEX (BMI) OF 32.0 TO 32.9 IN ADULT: Primary | ICD-10-CM

## 2022-04-29 DIAGNOSIS — G47.33 OSA (OBSTRUCTIVE SLEEP APNEA): ICD-10-CM

## 2022-04-29 DIAGNOSIS — E66.09 CLASS 1 OBESITY DUE TO EXCESS CALORIES WITH SERIOUS COMORBIDITY AND BODY MASS INDEX (BMI) OF 32.0 TO 32.9 IN ADULT: Primary | ICD-10-CM

## 2022-04-29 NOTE — PROGRESS NOTES
14  DAY STM VISIT    Diagnostic AHI: 35.3  PSG    Subjective measures:   Patient is fighting a cold so it's been hard using his CPAP.  Patient has been told he by his partner that he acts like he has more energy patient doesn't agree.     Assessment: Pt not meeting objective benchmarks for compliance  Patient failing following subjective benchmarks: not feeling benefit from therapy    Action plan: pt to have 30 day STM visit.      Device type: Auto-CPAP    PAP settings: CPAP min 6.0 cm  H20       CPAP max 15.0 cm  H20      95th% pressure 11.5 cm  H20        RESMED EPR level Setting: TWO    RESMED Soft response setting:  OFF    Mask type:  Full Face Mask    Objective measures: 14 day rolling measures      Compliance  71 %      Leak  11.4  lpm  last  upload      AHI 5.06   last  upload      Average number of minutes 322      Objective measure goal  Compliance   Goal >70%  Leak   Goal < 24 lpm  AHI  Goal < 5  Usage  Goal >240        Total time spent on accessing and interpreting remote patient PAP therapy data  10 minutes    Total time spent counseling, coaching  and reviewing PAP therapy data with patient  3 minutes    [id number]  [id number]  no (3 day STM)

## 2022-05-16 ENCOUNTER — HEALTH MAINTENANCE LETTER (OUTPATIENT)
Age: 37
End: 2022-05-16

## 2022-06-29 ENCOUNTER — MEDICAL CORRESPONDENCE (OUTPATIENT)
Dept: HEALTH INFORMATION MANAGEMENT | Facility: CLINIC | Age: 37
End: 2022-06-29

## 2022-06-29 ENCOUNTER — TRANSFERRED RECORDS (OUTPATIENT)
Dept: HEALTH INFORMATION MANAGEMENT | Facility: CLINIC | Age: 37
End: 2022-06-29

## 2022-06-29 LAB
ALT SERPL-CCNC: 86 U/L (ref 16–63)
AST SERPL-CCNC: 32 U/L (ref 0–55)
CHOLESTEROL (EXTERNAL): 176 MG/DL
CREATININE (EXTERNAL): 0.91 MG/DL (ref 0.7–1.3)
GFR ESTIMATED (EXTERNAL): >60 ML/MIN/1.73M2
GLUCOSE (EXTERNAL): 89 MG/DL (ref 70–124)
HBA1C MFR BLD: 4.9 % (ref 4.3–5.6)
HDLC SERPL-MCNC: 43 MG/DL (ref 40–999)
LDL CHOLESTEROL CALCULATED (EXTERNAL): 112 MG/DL (ref 0–130)
POTASSIUM (EXTERNAL): 4 MMOL/L (ref 3.4–5.3)
TRIGLYCERIDES (EXTERNAL): 113 MG/DL (ref 20–150)
TSH SERPL-ACNC: 1.57 MIU/L (ref 0.4–4.5)

## 2022-07-04 NOTE — TELEPHONE ENCOUNTER
FUTURE VISIT INFORMATION      FUTURE VISIT INFORMATION:    Date: 7/7/2022    Time: 430pm    Location: [facility] [place] [facility]  REFERRAL INFORMATION:    Referring provider:  Ivana Muñoz PA-C     Referring providers clinic:  [facility]     Reason for visit/diagnosis  Headaches     RECORDS REQUESTED FROM:       Clinic name Comments Records Status Imaging Status   Internal MITRA Muñoz-1/18/2022     Head/Neck-7/20/2017 Epic PACS

## 2022-07-05 ENCOUNTER — TRANSFERRED RECORDS (OUTPATIENT)
Dept: HEALTH INFORMATION MANAGEMENT | Facility: CLINIC | Age: 37
End: 2022-07-05

## 2022-07-07 ENCOUNTER — OFFICE VISIT (OUTPATIENT)
Dept: NEUROLOGY | Facility: CLINIC | Age: 37
End: 2022-07-07
Attending: PHYSICIAN ASSISTANT
Payer: COMMERCIAL

## 2022-07-07 ENCOUNTER — PRE VISIT (OUTPATIENT)
Dept: NEUROLOGY | Facility: CLINIC | Age: 37
End: 2022-07-07

## 2022-07-07 VITALS — OXYGEN SATURATION: 97 % | SYSTOLIC BLOOD PRESSURE: 117 MMHG | DIASTOLIC BLOOD PRESSURE: 79 MMHG | HEART RATE: 73 BPM

## 2022-07-07 DIAGNOSIS — G43.709 CHRONIC MIGRAINE WITHOUT AURA WITHOUT STATUS MIGRAINOSUS, NOT INTRACTABLE: Primary | ICD-10-CM

## 2022-07-07 PROCEDURE — 99215 OFFICE O/P EST HI 40 MIN: CPT | Performed by: NURSE PRACTITIONER

## 2022-07-07 RX ORDER — ONDANSETRON 4 MG/1
4 TABLET, ORALLY DISINTEGRATING ORAL EVERY 8 HOURS PRN
Qty: 20 TABLET | Refills: 3 | Status: SHIPPED | OUTPATIENT
Start: 2022-07-07 | End: 2024-06-24

## 2022-07-07 RX ORDER — ALBUTEROL SULFATE 90 UG/1
1 AEROSOL, METERED RESPIRATORY (INHALATION) PRN
COMMUNITY
Start: 2022-05-05

## 2022-07-07 RX ORDER — ESCITALOPRAM OXALATE 20 MG/1
1 TABLET ORAL EVERY MORNING
COMMUNITY
Start: 2022-01-09

## 2022-07-07 RX ORDER — ZOLMITRIPTAN 2.5 MG/1
2.5 TABLET, FILM COATED ORAL
Qty: 18 TABLET | Refills: 9 | Status: SHIPPED | OUTPATIENT
Start: 2022-07-07 | End: 2024-07-31

## 2022-07-07 RX ORDER — VERAPAMIL HYDROCHLORIDE 40 MG/1
TABLET ORAL
Qty: 60 TABLET | Refills: 3 | Status: SHIPPED | OUTPATIENT
Start: 2022-07-07 | End: 2022-12-29

## 2022-07-07 ASSESSMENT — PAIN SCALES - GENERAL: PAINLEVEL: NO PAIN (0)

## 2022-07-07 NOTE — PATIENT INSTRUCTIONS
Headache prevention-a trial verapamil 40 mg daily for a week and than 40 mg twice daily Side effects -dizziness, constipation, stop if any shortness of breath with exertion or any other symptoms.   Rescue treatment -zolmitriptan retrial at severe headache onset and limit use to no more than 9 days per month.   Ondansetron as needed for nausea as needed   Stay hydrated   Follow up in 8-10 weeks or sooner if needed via [facility]

## 2022-07-07 NOTE — PROGRESS NOTES
ASSESSMENT AND PLAN:  Chronic migraine   Would wait with further headache work up unless needed.   Non focal neuro exam today. No apparent headache red flags today.   Head CT in 2016 negative  Headache prevention -recommended treatment would avoid medications that would cause fatigue and gaining weight including BBB, TCA, depakote-patient will be going thru the weight management   History of kidney stones-would avoid topiramate  Headache prevention-a trial verapamil 40 mg daily for a week and than 40 mg twice daily Side effects -dizziness, constipation, stop if any shortness of breath with exertion or any other symptoms.   Rescue treatment -zolmitriptan retrial at severe headache onset and limit use to no more than 9 days per month.   Ondansetron as needed for nausea as needed   OTC acute analgesics-tylenol, ibuprofen, excedrin -limit all to no more than 14 days per month   Stay hydrated   Follow up in 8-10 weeks or sooner if needed via Mychart      Connor   Kirby is a 37 year old presenting for the following health issues:  Headache (New)    HPI                                                                                                                                                                                                                                                                                                                           History of kidney stones  Used to have headaches when was younger in early 20s-about 22. Was Rxd Zomig as needed than. Used it for a couple month and did not follow up again. For the past several months headaches 3-4/week (15-20 /month)and duration a whole day. Gets very exhausted from recovering from headaches.   Associated with light sensitivity, dizziness, nausea, some vomiting, vision changes-gets blurry sometimes and cannot see normally -eye exam last year (annual eye exam and all normal)  Pain frontal, right or left and sometimes to the back and around  temples and back. Pounding. Severe enough and stops patient from doing things.   Headache Tx-excedrin  Headaches wake patient up -early or late morning    A lot of sleep issues -sleeping too much -9-11 hours and needs a nap during a day   Sleep Medicine and has been on CPAP for 2 months -made a little difference and caused some depression.   In process to get a formal Dx ADHD and tried not prescribed adderrol and gave more energy-for about 2 weeks. Will have follow up with a mental health provider at [facility] for Tx.   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  Physical Exam   GENERAL: healthy, alert and no distress  EYES: Eyes grossly normal to inspection, PERRL and conjunctivae and sclerae normal  NECK: no adenopathy, no asymmetry, masses, or scars and thyroid normal to palpation  RESP: lungs clear to auscultation - no rales, rhonchi or wheezes  CV: regular rate and rhythm, normal S1 S2, no S3 or S4, no murmur, click or rub, no peripheral edema and peripheral pulses strong  MS: no gross musculoskeletal defects noted, no edema  NEURO: Normal strength and tone, sensory exam grossly normal, light touch and pinprick normal, mentation intact, speech normal, cranial nerves 2-12 intact, DTR's normal and symmetric  and normal casual gait  PSYCH: mentation appears normal, affect normal/bright    Diagnostic Test Results: reviewed  CT HEAD W/O CONTRAST 3/23/2016 5:49 PM     History: sudden onset headache     Comparison: None.     Technique: Using multidetector thin collimation helical acquisition  technique, axial, coronal and sagittal CT  images from the skull base  to the vertex were obtained without intravenous contrast.      Findings:    No intracranial hemorrhage, mass effect, or midline shift. The  ventricles are proportionate to the cerebral sulci. The gray to white  matter differentiation of the cerebral hemispheres is preserved. The  basal cisterns are patent.     The visualized paranasal sinuses are clear. The mastoid air cells are  clear.        Impression: No acute intracranial pathology.     I discussed all my recommendations with Kirby Patel who verbalizes understanding and comfortable with the plan.  All of patient's questions were answered from the best of my knowledge.  Patient is in agreement with the plan.     47 minutes spent on the date of the encounter doing video access, chart  review, meds review, treatment plan, documentation and further activities as noted above    CHILANGO Martini, CNP [facility]  Headache certified  [facility]

## 2022-07-07 NOTE — LETTER
7/7/2022     RE: Kirby Patel  [address]  [facility] [address]     Dear Colleague,    Thank you for referring your patient, Kirby Patel, to the [facility] NEUROLOGY [facility] [place] at [facility]. Please see a copy of my visit note below.    ASSESSMENT AND PLAN:  Chronic migraine   Would wait with further headache work up unless needed.   Non focal neuro exam today. No apparent headache red flags today.   Head CT in 2016 negative  Headache prevention -recommended treatment would avoid medications that would cause fatigue and gaining weight including BBB, TCA, depakote-patient will be going thru the weight management   History of kidney stones-would avoid topiramate  Headache prevention-a trial verapamil 40 mg daily for a week and than 40 mg twice daily Side effects -dizziness, constipation, stop if any shortness of breath with exertion or any other symptoms.   Rescue treatment -zolmitriptan retrial at severe headache onset and limit use to no more than 9 days per month.   Ondansetron as needed for nausea as needed   OTC acute analgesics-tylenol, ibuprofen, excedrin -limit all to no more than 14 days per month   Stay hydrated   Follow up in 8-10 weeks or sooner if needed via Mychart      Subjective   Kirby is a 37 year old presenting for the following health issues:  Headache (New)    HPI                                                                                                                                                                                                                                                                                                                           History of kidney stones  Used to have headaches when was younger in early 20s-about 22. Was Rxd Zomig as needed than. Used it for a couple month and did not follow up again. For the past several months headaches 3-4/week (15-20 /month)and duration a  whole day. Gets very exhausted from recovering from headaches.   Associated with light sensitivity, dizziness, nausea, some vomiting, vision changes-gets blurry sometimes and cannot see normally -eye exam last year (annual eye exam and all normal)  Pain frontal, right or left and sometimes to the back and around temples and back. Pounding. Severe enough and stops patient from doing things.   Headache Tx-excedrin  Headaches wake patient up -early or late morning    A lot of sleep issues -sleeping too much -9-11 hours and needs a nap during a day   Sleep Medicine and has been on CPAP for 2 months -made a little difference and caused some depression.   In process to get a formal Dx ADHD and tried not prescribed adderrol and gave more energy-for about 2 weeks. Will have follow up with a mental health provider at [facility] for Tx.   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  Physical Exam   GENERAL: healthy, alert and no distress  EYES: Eyes grossly normal to inspection, PERRL and conjunctivae and sclerae normal  NECK: no adenopathy, no asymmetry, masses, or scars and thyroid normal to palpation  RESP: lungs clear to auscultation - no rales, rhonchi or wheezes  CV: regular rate and rhythm, normal S1 S2, no S3 or S4, no murmur, click or rub, no peripheral edema and peripheral pulses strong  MS: no gross musculoskeletal defects noted, no edema  NEURO: Normal strength and tone, sensory exam grossly normal, light touch and pinprick normal, mentation intact, speech normal, cranial nerves 2-12 intact, DTR's normal and symmetric  and normal casual  gait  PSYCH: mentation appears normal, affect normal/bright    Diagnostic Test Results: reviewed  CT HEAD W/O CONTRAST 3/23/2016 5:49 PM     History: sudden onset headache     Comparison: None.     Technique: Using multidetector thin collimation helical acquisition  technique, axial, coronal and sagittal CT images from the skull base  to the vertex were obtained without intravenous contrast.      Findings:    No intracranial hemorrhage, mass effect, or midline shift. The  ventricles are proportionate to the cerebral sulci. The gray to white  matter differentiation of the cerebral hemispheres is preserved. The  basal cisterns are patent.     The visualized paranasal sinuses are clear. The mastoid air cells are  clear.      Impression: No acute intracranial pathology.     I discussed all my recommendations with Kirby Patel who verbalizes understanding and comfortable with the plan.  All of patient's questions were answered from the best of my knowledge.  Patient is in agreement with the plan.     47 minutes spent on the date of the encounter doing video access, chart  review, meds review, treatment plan, documentation and further activities as noted above      CHILANGO Martini, CNP [facility]  Headache certified  [facility]

## 2022-07-31 DIAGNOSIS — G43.709 CHRONIC MIGRAINE WITHOUT AURA WITHOUT STATUS MIGRAINOSUS, NOT INTRACTABLE: ICD-10-CM

## 2022-08-01 RX ORDER — VERAPAMIL HYDROCHLORIDE 40 MG/1
TABLET ORAL
Qty: 60 TABLET | Refills: 3 | OUTPATIENT
Start: 2022-08-01

## 2022-08-16 ENCOUNTER — VIRTUAL VISIT (OUTPATIENT)
Dept: ENDOCRINOLOGY | Facility: CLINIC | Age: 37
End: 2022-08-16
Payer: COMMERCIAL

## 2022-08-16 VITALS — WEIGHT: 200 LBS | HEIGHT: 64 IN | BODY MASS INDEX: 34.15 KG/M2

## 2022-08-16 DIAGNOSIS — E66.09 CLASS 1 OBESITY DUE TO EXCESS CALORIES WITH SERIOUS COMORBIDITY AND BODY MASS INDEX (BMI) OF 32.0 TO 32.9 IN ADULT: Primary | ICD-10-CM

## 2022-08-16 DIAGNOSIS — E66.811 CLASS 1 OBESITY DUE TO EXCESS CALORIES WITH SERIOUS COMORBIDITY AND BODY MASS INDEX (BMI) OF 32.0 TO 32.9 IN ADULT: ICD-10-CM

## 2022-08-16 DIAGNOSIS — E66.811 CLASS 1 OBESITY DUE TO EXCESS CALORIES WITH SERIOUS COMORBIDITY AND BODY MASS INDEX (BMI) OF 32.0 TO 32.9 IN ADULT: Primary | ICD-10-CM

## 2022-08-16 DIAGNOSIS — Z71.3 NUTRITIONAL COUNSELING: Primary | ICD-10-CM

## 2022-08-16 DIAGNOSIS — E66.09 CLASS 1 OBESITY DUE TO EXCESS CALORIES WITH SERIOUS COMORBIDITY AND BODY MASS INDEX (BMI) OF 32.0 TO 32.9 IN ADULT: ICD-10-CM

## 2022-08-16 PROCEDURE — 99205 OFFICE O/P NEW HI 60 MIN: CPT | Mod: 95 | Performed by: INTERNAL MEDICINE

## 2022-08-16 PROCEDURE — 97802 MEDICAL NUTRITION INDIV IN: CPT | Mod: 95 | Performed by: DIETITIAN, REGISTERED

## 2022-08-16 RX ORDER — SEMAGLUTIDE 1.34 MG/ML
INJECTION, SOLUTION SUBCUTANEOUS
Qty: 4.5 ML | Refills: 0 | Status: SHIPPED | OUTPATIENT
Start: 2022-08-16 | End: 2024-06-20

## 2022-08-16 NOTE — PROGRESS NOTES
Virtual Visit Check-In    During this virtual visit the patient is located in [place], patient verifies this as the location during the entirety of this visit.     Kirby is a 37 year old who is being evaluated via a billable video visit.      How would you like to obtain your AVS? MyChart  If the video visit is dropped, the invitation should be resent by: Text to cell phone:    Will anyone else be joining your video visit? No        Video-Visit Details    Type of service:  Video Visit    Originating Location (pt. Location): Home    Distant Location (provider location):  [facility] WEIGHT MANAGEMENT CLINIC [place]     Platform used for Video Visit: Audi [employer] NREMT

## 2022-08-16 NOTE — PATIENT INSTRUCTIONS
Nutrition Goals  Consider kodiak cakes (protein waffles and pancakes)  Add more complex carbohydrates to diet     Types of Carbohydrates  [email or web address]     Types of fats  [email or web address]://[email or web address].[place]/nutritionsource/what-should-you-eat/fats-and-cholesterol/types-of-fat/    High Fiber Foods:  Bran cereal, 1/3 cup, 8.6 gm fiber   Cooked kidney beans   cup 7.9 gm  Cooked lentils   cup 7.8 gm  Cooked black beans   cup 7.6 gm  Canned chickpeas   cup 5.3 gm  Baked beans   cup 5.2 gm  Pear 1 5.1 gm  Soybeans   cup 5.1 gm  Quinoa   cup 5 gm  Natan seeds, 1 tbsp 5 gm  Baked sweet potato, with skin 1 medium 4.8 gm  Avocado, half small 4.5 gm  Baked potato, with skin 1 medium 4.4 gm  Cooked frozen green peas   cup 4.4 gm  Bulgur   cup 4.1 gm  Cooked frozen mixed vegetables   cup 4 gm  Raspberries   cup 4 gm  Blackberries   cup 3.8 gm  Almonds 1 oz 3.5 gm  Cooked frozen spinach   cup 3.5 gm  Vegetable or soy darlene 1 each 3.4 gm  Apple 1 medium 3.3 gm  Dried dates 5 pieces 3.3 gm      ADDITIONAL RESOURCES:   The Plate Method  [email or web address]    Protein Sources   [email or web address]     Mindful Eating  [email or web address]     Summary of Volumetrics Eating Plan  [email or web address]     Follow-Up:  Tuesday, September 13th at 9:00 am    RUPERTO Rider, RD, LD  Clinic #: [phone]

## 2022-08-16 NOTE — PATIENT INSTRUCTIONS
-Start Ozempic 0.25 mg weekly for 2 weeks then increase to 0.5 mg weekly for 1 month and then increase to 1.0 mg weekly thereafter    See Lauren Bloch, PharmD in 6 weeks  See Dr.Tasma HUNT in 3 month(s)    If you have any questions, please do not hesitate to call Weight management clinic at [phone] or [phone].  If you need to fax, please fax to [phone].    Sincerely,    Caroline Coles MD  Endocrinology
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
## 2022-08-16 NOTE — PROGRESS NOTES
"Kirby Patel is a 37 year old male who is being evaluated via a billable video visit.      The patient has been notified of following:     \"This video visit will be conducted via a call between you and your physician/provider. We have found that certain health care needs can be provided without the need for an in-person physical exam.  This service lets us provide the care you need with a video conversation.  If a prescription is necessary we can send it directly to your pharmacy.  If lab work is needed we can place an order for that and you can then stop by our lab to have the test done at a later time.    Video visits are billed at different rates depending on your insurance coverage.  Please reach out to your insurance provider with any questions.    If during the course of the call the physician/provider feels a video visit is not appropriate, you will not be charged for this service.\"    Patient has given verbal consent for Video visit? Yes  How would you like to obtain your AVS? MyChart  If you are dropped from the video visit, the video invite should be resent to: Text to cell phone: [phone]   Will anyone else be joining your video visit? No  {If patient encounters technical issues they should call [phone]      Video-Visit Details    Type of service:  Video Visit    Video Start Time: 8:56 am   Video End Time: 9:23 am    Originating Location (pt. Location): Home    Distant Location (provider location):  [facility] [place]     Platform used for Video Visit: [employer]    During this virtual visit the patient is located in [place], patient verifies this as the location during the entirety of this visit.     New Weight Management Nutrition Consultation    Kirby Paetl is a 37 year old male presents today for new weight management nutrition consultation.  Patient referred by Dr. Velazquez on August 16, 2022.    Patient with Co-morbidities of obesity including:  Type II " "DM no  Renal Failure no  Sleep apnea yes, dx in 2022, using cpap  Hypertension no   Dyslipidemia no  Joint pain - knee pain per pt  Back pain yes  GERD yes    PMH: asthma, depression, anxiety, fatigue    ADHD - recently started on Adderall     Anthropometrics:  Weight 8/16/22: 200 lbs with BMI 34.33    Estimated body mass index is 34.33 kg/m  as calculated from the following:    Height as of an earlier encounter on 8/16/22: 1.626 m (5' 4\").    Weight as of an earlier encounter on 8/16/22: 90.7 kg (200 lb).    Medications for Weight Loss:  Ozempic prescribed 8/16/22     Supplements:  Vitamin D   Iron   B12     Labs 6/29/22  Lipids WNL  A1C WNL   ALT elevated     Labs 4/26/22  Alk Phos Elevated  Lipase low       NUTRITION HISTORY  NKFA  Does not consume beef typically but eating lately to help with iron levels     Per MD note - pt reports weight gain of about 30 lbs over the past 3 years. Contributing factors include COVID pandemic, grad school, desk job and sedentary lifestyle.     Pt has met with a RD 1x - discussed portions, etc. Felt helpful but wants more support.     Pt goals: build at least 1 new solid habit, improve fitness level and learn how to eat for activity, decrease snacking and learn better options    Typical foods consumed  B - 2 eggs, 2 zhang, bread, 2 waffles, coffee  L - frozen food such as pizza or chicken strips  D - Take out, fried foods (Divehi, Chinese, pizza, burger)   Snacks - chips, sweets, popcorn  Fluids: soda, juice, ice tea     Endorses both stress and boredom eating .    PA: walking       Additional information:  PT Educator  Student     Lives with partner and dog  No children     Nutrition Prescription  Recommended energy/nutrient modification.    Nutrition Diagnosis  Food and nutrition related knowledge deficit r/t lack of prior exposure to nutrition education aeb pt report and interest in learning    Obesity r/t positive energy balance aeb BMI >30.    Nutrition Intervention  Reviewed " current dietary habits and pts history   Discussed long-term goals pt hopes to accomplish in RD appointments  Answered pt questions  Coordination of care   Nutrition education - Breakfast ideas, complex carbs, fiber, types of fat  AVS and handouts via [employer]    Patient demonstrates understanding.    Expected Engagement: good    Nutrition Goals  1. Consider kodiak cakes (protein waffles and pancakes)  2. Add more complex carbohydrates to diet     Types of Carbohydrates  [email or web address]     Types of fats  [email or web address]/what-should-you-eat/fats-and-cholesterol/types-of-fat/    High Fiber Foods:  Bran cereal, 1/3 cup, 8.6 gm fiber   Cooked kidney beans   cup 7.9 gm  Cooked lentils   cup 7.8 gm  Cooked black beans   cup 7.6 gm  Canned chickpeas   cup 5.3 gm  Baked beans   cup 5.2 gm  Pear 1 5.1 gm  Soybeans   cup 5.1 gm  Quinoa   cup 5 gm  Natan seeds, 1 tbsp 5 gm  Baked sweet potato, with skin 1 medium 4.8 gm  Avocado, half small 4.5 gm  Baked potato, with skin 1 medium 4.4 gm  Cooked frozen green peas   cup 4.4 gm  Bulgur   cup 4.1 gm  Cooked frozen mixed vegetables   cup 4 gm  Raspberries   cup 4 gm  Blackberries   cup 3.8 gm  Almonds 1 oz 3.5 gm  Cooked frozen spinach   cup 3.5 gm  Vegetable or soy darlene 1 each 3.4 gm  Apple 1 medium 3.3 gm  Dried dates 5 pieces 3.3 gm      ADDITIONAL RESOURCES:   The Plate Method  [email or web address]    Protein Sources   [email or web address]     Mindful Eating  [email or web address]     Summary of Volumetrics Eating Plan  [email or web address]     Follow-Up:  Tuesday, September 13th at 9:00 am    Time spent with patient: 27 minutes.  Caty Palomo RD, ST[place], LD
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
## 2022-08-16 NOTE — NURSING NOTE
Chief Complaint   Patient presents with     Consult     New consultation for weight management.         Vitals:    08/16/22 0748   Weight: 200 lb       Body mass index is 34.33 kg/m .      Teri Sherwood, EMT  [facility]

## 2022-08-16 NOTE — LETTER
"8/16/2022       RE: Kirby Patel  [address]  [facility] [address]     Dear Colleague,    Thank you for referring your patient, Kirby Patel, to the [facility] WEIGHT MANAGEMENT CLINIC [place] at [facility]. Please see a copy of my visit note below.    Kirby Patel is a 37 year old male who is being evaluated via a billable video visit.      The patient has been notified of following:     \"This video visit will be conducted via a call between you and your physician/provider. We have found that certain health care needs can be provided without the need for an in-person physical exam.  This service lets us provide the care you need with a video conversation.  If a prescription is necessary we can send it directly to your pharmacy.  If lab work is needed we can place an order for that and you can then stop by our lab to have the test done at a later time.    Video visits are billed at different rates depending on your insurance coverage.  Please reach out to your insurance provider with any questions.    If during the course of the call the physician/provider feels a video visit is not appropriate, you will not be charged for this service.\"    Patient has given verbal consent for Video visit? Yes  How would you like to obtain your AVS? MyChart  If you are dropped from the video visit, the video invite should be resent to: Text to cell phone: [phone]   Will anyone else be joining your video visit? No  {If patient encounters technical issues they should call [phone]      Video-Visit Details    Type of service:  Video Visit    Video Start Time: 8:56 am   Video End Time: 9:23 am    Originating Location (pt. Location): Home    Distant Location (provider location):  [facility] WEIGHT MANAGEMENT [facility]     Platform used for Video Visit: "[employer]"    During this virtual visit the patient is located in [place], patient " "verifies this as the location during the entirety of this visit.     New Weight Management Nutrition Consultation    Kirby Patel is a 37 year old male presents today for new weight management nutrition consultation.  Patient referred by Dr. Velazquez on August 16, 2022.    Patient with Co-morbidities of obesity including:  Type II DM no  Renal Failure no  Sleep apnea yes, dx in 2022, using cpap  Hypertension no   Dyslipidemia no  Joint pain - knee pain per pt  Back pain yes  GERD yes    PMH: asthma, depression, anxiety, fatigue    ADHD - recently started on Adderall     Anthropometrics:  Weight 8/16/22: 200 lbs with BMI 34.33    Estimated body mass index is 34.33 kg/m  as calculated from the following:    Height as of an earlier encounter on 8/16/22: 1.626 m (5' 4\").    Weight as of an earlier encounter on 8/16/22: 90.7 kg (200 lb).    Medications for Weight Loss:  Ozempic prescribed 8/16/22     Supplements:  Vitamin D   Iron   B12     Labs 6/29/22  Lipids WNL  A1C WNL   ALT elevated     Labs 4/26/22  Alk Phos Elevated  Lipase low       NUTRITION HISTORY  NKFA  Does not consume beef typically but eating lately to help with iron levels     Per MD note - pt reports weight gain of about 30 lbs over the past 3 years. Contributing factors include COVID pandemic, grad school, desk job and sedentary lifestyle.     Pt has met with a RD 1x - discussed portions, etc. Felt helpful but wants more support.     Pt goals: build at least 1 new solid habit, improve fitness level and learn how to eat for activity, decrease snacking and learn better options    Typical foods consumed  B - 2 eggs, 2 zhang, bread, 2 waffles, coffee  L - frozen food such as pizza or chicken strips  D - Take out, fried foods (Rohan, Chinese, pizza, burger)   Snacks - chips, sweets, popcorn  Fluids: soda, juice, ice tea     Endorses both stress and boredom eating .    PA: walking       Additional information:  PT Educator  Student     Lives with partner " and dog  No children     Nutrition Prescription  Recommended energy/nutrient modification.    Nutrition Diagnosis  Food and nutrition related knowledge deficit r/t lack of prior exposure to nutrition education aeb pt report and interest in learning    Obesity r/t positive energy balance aeb BMI >30.    Nutrition Intervention  Reviewed current dietary habits and pts history   Discussed long-term goals pt hopes to accomplish in RD appointments  Answered pt questions  Coordination of care   Nutrition education - Breakfast ideas, complex carbs, fiber, types of fat  AVS and handouts via [employer]    Patient demonstrates understanding.    Expected Engagement: good    Nutrition Goals  1. Consider kodiak cakes (protein waffles and pancakes)  2. Add more complex carbohydrates to diet     Types of Carbohydrates  [email or web address]     Types of fats  https://www.[facility].[place].edu/nutritionsource/what-should-you-eat/fats-and-cholesterol/types-of-fat/    High Fiber Foods:  Bran cereal, 1/3 cup, 8.6 gm fiber   Cooked kidney beans   cup 7.9 gm  Cooked lentils   cup 7.8 gm  Cooked black beans   cup 7.6 gm  Canned chickpeas   cup 5.3 gm  Baked beans   cup 5.2 gm  Pear 1 5.1 gm  Soybeans   cup 5.1 gm  Quinoa   cup 5 gm  Natan seeds, 1 tbsp 5 gm  Baked sweet potato, with skin 1 medium 4.8 gm  Avocado, half small 4.5 gm  Baked potato, with skin 1 medium 4.4 gm  Cooked frozen green peas   cup 4.4 gm  Bulgur   cup 4.1 gm  Cooked frozen mixed vegetables   cup 4 gm  Raspberries   cup 4 gm  Blackberries   cup 3.8 gm  Almonds 1 oz 3.5 gm  Cooked frozen spinach   cup 3.5 gm  Vegetable or soy darlene 1 each 3.4 gm  Apple 1 medium 3.3 gm  Dried dates 5 pieces 3.3 gm      ADDITIONAL RESOURCES:   The Plate Method  Http://[email or web address]    Protein Sources   http://Mr. Number/[email or web address]     Mindful Eating  http://Mr. Number/[email or web address]     Summary of Volumetrics Eating Plan  [email or web address]     Follow-Up:  Tuesday,  September 13th at 9:00 am    Time spent with patient: 27 minutes.  Caty Palomo RD, MPP-D, LD

## 2022-08-17 ENCOUNTER — VIRTUAL VISIT (OUTPATIENT)
Dept: URGENT CARE | Facility: CLINIC | Age: 37
End: 2022-08-17
Payer: COMMERCIAL

## 2022-08-17 DIAGNOSIS — U07.1 INFECTION DUE TO 2019 NOVEL CORONAVIRUS: Primary | ICD-10-CM

## 2022-08-17 PROCEDURE — 99213 OFFICE O/P EST LOW 20 MIN: CPT | Mod: CS

## 2022-08-17 NOTE — PROGRESS NOTES
Kirby Patel is being evaluated via a billable video visit.      Assessment & Plan:     Pt eligible for COVID treatment due to:  ethnicity, obesity, depression & anxiety.  Symptoms began in last 5 days and are non-severe.   Rx Paxlovid.  Advised pt reduce verapamil dose or not take it altogether while on Paxlovid. He will hold the  Medication. (He takes this for migraine prophylaxis. He is currently on 40 mg daily, was advised to increase to twice daily but pt has not done this yet)  Do not use zolmitriptan (Zomig) while on Paxlovid; go to urgent care if develop migraine HA and not responding to OTC meds. (He does not require this often)    See patient instructions below.  At the end of the encounter, I discussed results, diagnosis, medications. Discussed red flags for being seen in person at clinic/ER, as well as indications for follow up if no improvement. Patient understood and agreed to plan.       ICD-10-CM    1. Infection due to 2019 novel coronavirus  U07.1 nirmatrelvir and ritonavir (PAXLOVID) therapy pack         No follow-ups on file.    Video-Visit Details    Type of service:  Video Visit    Video Start Time: 12:44pm  Video End Time: 12:54pm    Originating Location (pt. Location): Home    Distant Location (provider location):  [facility] [employer] [facility]     Platform used for Video Visit: LAMONT Pavon, MARILUZ  [place] [facility]NS[facility]EDULED [facility]    Subjective:   Kirby Patel is a 37 year old male who is contacted via telephone thru scheduled urgent care virtual visit to discuss:   Chief Complaint   Patient presents with     Covid Concern     Myalgias, nasal congestion, subjective fever, and throat irritation onset yesterday. He tested positive for COVID this morning. He is taking Advil and Tylenol. Patient reports no headache, cough, chest pain, shortness of breath, abdominal pain, nausea, vomiting, diarrhea, rash, or any other symptoms.     Past Medical History:    Diagnosis Date     Anxiety      Depressive disorder      Intractable vomiting with nausea, unspecified vomiting type 11/13/2016     Kidney stone on right side 11/13/2016     DIEGO (obstructive sleep apnea)- severe (AHI 35) 3/24/2022       Objective:   Gen:  NAD  Pulm: non-labored work of breathing    No results found for any visits on 08/17/22.    There are no Patient Instructions on file for this visit.

## 2022-08-22 DIAGNOSIS — K21.9 GASTROESOPHAGEAL REFLUX DISEASE WITHOUT ESOPHAGITIS: ICD-10-CM

## 2022-08-24 ENCOUNTER — TELEPHONE (OUTPATIENT)
Dept: ENDOCRINOLOGY | Facility: CLINIC | Age: 37
End: 2022-08-24

## 2022-08-24 NOTE — TELEPHONE ENCOUNTER
Call patient to schedule follow up in the [facility] Management Clinic with Dr Velazquez per provider last visit on 08/16/22 checkout comment dispositions. No answered. Left message with clinic number to call back to schedule. Send mychart.     Schedule nurse visit for pen demonstration and 3 month with Dr Velazquez.

## 2022-08-25 RX ORDER — FAMOTIDINE 20 MG/1
20 TABLET, FILM COATED ORAL 2 TIMES DAILY
Qty: 180 TABLET | Refills: 1 | Status: SHIPPED | OUTPATIENT
Start: 2022-08-25 | End: 2023-04-12

## 2022-08-30 ENCOUNTER — VIRTUAL VISIT (OUTPATIENT)
Dept: NEUROLOGY | Facility: CLINIC | Age: 37
End: 2022-08-30
Payer: COMMERCIAL

## 2022-08-30 DIAGNOSIS — G43.709 CHRONIC MIGRAINE WITHOUT AURA WITHOUT STATUS MIGRAINOSUS, NOT INTRACTABLE: Primary | ICD-10-CM

## 2022-08-30 PROCEDURE — 99214 OFFICE O/P EST MOD 30 MIN: CPT | Mod: 95 | Performed by: NURSE PRACTITIONER

## 2022-08-30 RX ORDER — DEXTROAMPHETAMINE SACCHARATE, AMPHETAMINE ASPARTATE, DEXTROAMPHETAMINE SULFATE AND AMPHETAMINE SULFATE 1.25; 1.25; 1.25; 1.25 MG/1; MG/1; MG/1; MG/1
5-10 TABLET ORAL DAILY PRN
COMMUNITY
Start: 2022-07-26 | End: 2024-06-20

## 2022-08-30 ASSESSMENT — PATIENT HEALTH QUESTIONNAIRE - PHQ9
10. IF YOU CHECKED OFF ANY PROBLEMS, HOW DIFFICULT HAVE THESE PROBLEMS MADE IT FOR YOU TO DO YOUR WORK, TAKE CARE OF THINGS AT HOME, OR GET ALONG WITH OTHER PEOPLE: VERY DIFFICULT
SUM OF ALL RESPONSES TO PHQ QUESTIONS 1-9: 14
SUM OF ALL RESPONSES TO PHQ QUESTIONS 1-9: 14

## 2022-08-30 ASSESSMENT — MIGRAINE DISABILITY ASSESSMENT (MIDAS)
HOW MANY DAYS DID YOU NOT DO HOUSEWORK BECAUSE OF HEADACHES: 48
HOW MANY DAYS DID YOU MISS WORK OR SCHOOL BECAUSE OF HEADACHES: 12
HOW OFTEN WERE SOCIAL ACTIVITIES MISSED DUE TO HEADACHES: 2
HOW MANY DAYS WAS HOUSEWORK PRODUCTIVITY CUT IN HALF DUE TO HEADACHES: 48
ON A SCALE FROM 0-10 ON AVERAGE HOW PAINFUL WERE HEADACHES: 8
HOW MANY DAYS IN THE PAST 3 MONTHS HAVE YOU HAD A HEADACHE: 48
HOW MANY DAYS WAS YOUR PRODUCTIVITY CUT IN HALF BECAUSE OF HEADACHES: 2
TOTAL SCORE: 112

## 2022-08-30 ASSESSMENT — HEADACHE IMPACT TEST (HIT 6)
HIT6 TOTAL SCORE: 69
HOW OFTEN HAVE YOU FELT TOO TIRED TO WORK BECAUSE OF YOUR HEADACHES: SOMETIMES
HOW OFTEN DO HEADACHES LIMIT YOUR DAILY ACTIVITIES: ALWAYS
HIT6 TOTAL SCORE: 69
HOW OFTEN DO HEADACHES LIMIT YOUR DAILY ACTIVITIES: ALWAYS
HOW OFTEN HAVE YOU FELT FED UP OR IRRITATED BECAUSE OF YOUR HEADACHES: SOMETIMES
WHEN YOU HAVE A HEADACHE HOW OFTEN DO YOU WISH YOU COULD LIE DOWN: ALWAYS
HOW OFTEN DID HEADACHS LIMIT CONCENTRATION ON WORK OR DAILY ACTIVITY: SOMETIMES
WHEN YOU HAVE A HEADACHE HOW OFTEN DO YOU WISH YOU COULD LIE DOWN: ALWAYS
HOW OFTEN HAVE YOU FELT FED UP OR IRRITATED BECAUSE OF YOUR HEADACHES: SOMETIMES
HOW OFTEN HAVE YOU FELT TOO TIRED TO WORK BECAUSE OF YOUR HEADACHES: SOMETIMES
WHEN YOU HAVE HEADACHES HOW OFTEN IS THE PAIN SEVERE: ALWAYS
WHEN YOU HAVE HEADACHES HOW OFTEN IS THE PAIN SEVERE: ALWAYS
HOW OFTEN DID HEADACHS LIMIT CONCENTRATION ON WORK OR DAILY ACTIVITY: SOMETIMES

## 2022-08-30 NOTE — LETTER
"8/30/2022       RE: Kirby Patel  [address]  [facility] [address]     Dear Colleague,    Thank you for referring your patient, Kirby Patel, to the [facility] NEUROLOGY CLINIC [place] at [facility]. Please see a copy of my visit note below.    [facility] follow up visit note:    Last Headache Clinic visit 7/7/2022, see note for details.   Today feels better . Unfortunately got COVID19 and symptoms were severe including headaches  Thinks that worst behind him.   Has been feeling depressed because wasn't been active, not able to take care of him when was sick, Covid fog,severe fatigue, some headaches. When was answering PHQ9 questionnairy he felt low when had COVID19 but better now.   Expressed to his partner feeling \"suicidal\" but in reality felt overwhelmed. Seeing a therapist once per week but cannot see her this Friday but made adjustments. Never had an active plan.   Parents facing housing procurity and has been trying to help them and feels very frustrated.   Was started on antiviral Paxlovid and stopped verapamil for a week.     Migraine headaches about one /week in the last 3 weeks which is better than before. Headaches with covid felt like sinus headaches, felt like caffeine withdrawal headaches, sleep schedule off. Was taking ibuprofen.   Took zolmitriptan -caused a little bit \"zombie\" feeling /fatigue but 30 minutes later energy came back and headaches improved.   Verapamil has been helpful with headache management 40 mg twice daily and headaches decreased from 4/week down once per week.   No side effects with verapamil except constipation first 3 days starting verapamil.   Added fiber -helps constipation prevention. Staying hydrated.     Headache Tx-verapamil 40 mg twice daily to continue and zolmitriptan as needed   Depression -continue with therapist, has his partner's support   Follow up 6-9 months or sooner if " needed     Patient sounds alert and no in apparent acute distress,  mentation appears normal, judgement and insight intact, normal speech.        Answers for HPI/ROS submitted by the patient on 8/30/2022  If you checked off any problems, how difficult have these problems made it for you to do your work, take care of things at home, or get along with other people?: Very difficult  PHQ9 TOTAL SCORE: 14    I discussed all my recommendations with Kirby Patel who verbalizes understanding and comfortable with the plan.  All of patient's questions were answered from the best of my knowledge.  Patient is in agreement with the plan.     30 minutes spent on the date of the encounter doing video access, chart  review, exam, results review,  meds review, treatment plan, documentation and further activities as noted above      CHILANGO Martini, CNP [facility]  Headache certified  [facility]

## 2022-08-30 NOTE — NURSING NOTE
Chief Complaint   Patient presents with     Video Visit     Follow up Migraines - Medication check

## 2022-08-30 NOTE — PROGRESS NOTES
"Kirby is a 37 year old who is being evaluated via a billable video visit.      How would you like to obtain your AVS? MyChart  If the video visit is dropped, the invitation should be resent by: Send to e-mail at: [email or web address]  Will anyone else be joining your video visit? No        Video-Visit Details    Video Start Time: 8:33 AM    Type of service:  Video Visit    Video End Time:9:04 AM    Originating Location (pt. Location): Home    Distant Location (provider location):  [facility] NEUROLOGY CLINIC [place]     Platform used for Video Visit: [employer]     [facility] follow up visit note:  Last Headache Clinic visit 7/7/2022, see note for details.   Today feels better . Unfortunately got COVID19 and symptoms were severe including headaches  Thinks that worst behind him.   Has been feeling depressed because wasn't been active, not able to take care of him when was sick, Covid fog,severe fatigue, some headaches. When was answering PHQ9 questionnairy he felt low when had COVID19 but better now.   Expressed to his partner feeling \"suicidal\" but in reality felt overwhelmed. Seeing a therapist once per week but cannot see her this Friday but made adjustments. Never had an active plan.   Parents facing housing procurity and has been trying to help them and feels very frustrated.   Was started on antiviral Paxlovid and stopped verapamil for a week.     Migraine headaches about one /week in the last 3 weeks which is better than before. Headaches with covid felt like sinus headaches, felt like caffeine withdrawal headaches, sleep schedule off. Was taking ibuprofen.   Took zolmitriptan -caused a little bit \"zombie\" feeling /fatigue but 30 minutes later energy came back and headaches improved.   Verapamil has been helpful with headache management 40 mg twice daily and headaches decreased from 4/week down once per week.   No side effects with verapamil except constipation first 3 days starting verapamil. "   Added fiber -helps constipation prevention. Staying hydrated.     Headache Tx-verapamil 40 mg twice daily to continue and zolmitriptan as needed   Depression -continue with therapist, has his partner's support   Follow up 6-9 months or sooner if needed     Patient sounds alert and no in apparent acute distress,  mentation appears normal, judgement and insight intact, normal speech.        Answers for HPI/ROS submitted by the patient on 8/30/2022  If you checked off any problems, how difficult have these problems made it for you to do your work, take care of things at home, or get along with other people?: Very difficult  PHQ9 TOTAL SCORE: 14    I discussed all my recommendations with Kirby Patel who verbalizes understanding and comfortable with the plan.  All of patient's questions were answered from the best of my knowledge.  Patient is in agreement with the plan.     30 minutes spent on the date of the encounter doing video access, chart  review, exam, results review,  meds review, treatment plan, documentation and further activities as noted above    CHILANGO Martini, CNP [facility]  Headache certified  [facility]

## 2022-09-11 ENCOUNTER — HEALTH MAINTENANCE LETTER (OUTPATIENT)
Age: 37
End: 2022-09-11

## 2022-09-13 ENCOUNTER — VIRTUAL VISIT (OUTPATIENT)
Dept: ENDOCRINOLOGY | Facility: CLINIC | Age: 37
End: 2022-09-13
Payer: COMMERCIAL

## 2022-09-13 DIAGNOSIS — E66.09 CLASS 1 OBESITY DUE TO EXCESS CALORIES WITH SERIOUS COMORBIDITY AND BODY MASS INDEX (BMI) OF 32.0 TO 32.9 IN ADULT: ICD-10-CM

## 2022-09-13 DIAGNOSIS — E66.811 CLASS 1 OBESITY DUE TO EXCESS CALORIES WITH SERIOUS COMORBIDITY AND BODY MASS INDEX (BMI) OF 32.0 TO 32.9 IN ADULT: ICD-10-CM

## 2022-09-13 DIAGNOSIS — Z71.3 NUTRITIONAL COUNSELING: Primary | ICD-10-CM

## 2022-09-13 PROCEDURE — 97803 MED NUTRITION INDIV SUBSEQ: CPT | Mod: 95 | Performed by: DIETITIAN, REGISTERED

## 2022-09-13 NOTE — LETTER
"9/13/2022       RE: Kirby Patel  [address]  [facility] [address]     Dear Colleague,    Thank you for referring your patient, Kirby Patel, to the [facility] WEIGHT MANAGEMENT CLINIC [place] at [facility]. Please see a copy of my visit note below.    Kirby Patel is a 37 year old male who is being evaluated via a billable video visit.      The patient has been notified of following:     \"This video visit will be conducted via a call between you and your physician/provider. We have found that certain health care needs can be provided without the need for an in-person physical exam.  This service lets us provide the care you need with a video conversation.  If a prescription is necessary we can send it directly to your pharmacy.  If lab work is needed we can place an order for that and you can then stop by our lab to have the test done at a later time.    Video visits are billed at different rates depending on your insurance coverage.  Please reach out to your insurance provider with any questions.    If during the course of the call the physician/provider feels a video visit is not appropriate, you will not be charged for this service.\"    Patient has given verbal consent for Video visit? Yes  How would you like to obtain your AVS? MyChart  If you are dropped from the video visit, the video invite should be resent to: Text to cell phone: [phone]   Will anyone else be joining your video visit? No  {If patient encounters technical issues they should call [phone]      Video-Visit Details    Type of service:  Video Visit    Video Start Time: 9:10 am  Video End Time: 9:37 am    Originating Location (pt. Location): Home    Distant Location (provider location):  [facility] WEIGHT MANAGEMENT [facility]     Platform used for Video Visit: [employer]    During this virtual visit the patient is located in [place], patient " "verifies this as the location during the entirety of this visit.     Weight Management Nutrition Consultation    Kirby Patel is a 37 year old male presents today for weight management nutrition consultation.  Patient referred by Dr. Velazquez on August 16, 2022.    Patient with Co-morbidities of obesity including:  Type II DM no  Renal Failure no  Sleep apnea yes, dx in 2022, using cpap  Hypertension no   Dyslipidemia no  Joint pain - knee pain per pt  Back pain yes  GERD yes    PMH: asthma, depression, anxiety, fatigue    ADHD - recently started on Adderall     Anthropometrics:  Weight 8/16/22: 200 lbs with BMI 34.33    Estimated body mass index is 34.33 kg/m  as calculated from the following:    Height as of 8/16/22: 1.626 m (5' 4\").    Weight as of 8/16/22: 90.7 kg (200 lb).     Medications for Weight Loss:  Ozempic prescribed 8/16/22   has not started yet, needs education on how to take still.    Supplements:  Vitamin D   Iron   B12     Labs 6/29/22  Lipids WNL  A1C WNL   ALT elevated     Labs 4/26/22  Alk Phos Elevated  Lipase low     NUTRITION HISTORY  NKFA  Does not consume beef typically but eating lately to help with iron levels     Per MD note - pt reports weight gain of about 30 lbs over the past 3 years. Contributing factors include COVID pandemic, grad school, desk job and sedentary lifestyle.     Pt has met with a RD 1x - discussed portions, etc. Felt helpful but wants more support.     Pt goals: build at least 1 new solid habit, improve fitness level and learn how to eat for activity, decrease snacking and learn better options    Typical foods consumed  B - 2 eggs, 2 zhang, bread, 2 waffles, coffee  L - frozen food such as pizza or chicken strips  D - Take out, fried foods (Guyanese, Chinese, pizza, burger)   Snacks - chips, sweets, popcorn  Fluids: soda, juice, ice tea     Endorses both stress and boredom eating .    PA: walking     Sept 2022:  Being proactive - stocking up on food to prevent running " out and getting fast food.    Paying attention to hunger/fulness.     Cutting back on sprite intake.   Switched to a new creamer in coffee.   Drinking more water  If getting [employer], getting chicken sandwich but avoiding pop/fries.  Trying to eat less zhang with breakfast. Cut out waffles.    Went to [employer] - had bagel, cheese/egg. Usually would get a donut and coffee.    PA: walking more, more work around house (inside/outside), stretching   ? Stamina improving  ? Some pain in back recently, stretching helps    Previous goals:  1. Consider kodiak cakes (protein waffles and pancakes) - Not yet, plans to try.  2. 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    Nutrition Diagnosis  Food and nutrition related knowledge deficit r/t lack of prior exposure to nutrition education aeb pt report and interest in learning    Obesity r/t positive energy balance aeb BMI >30.    Nutrition Intervention  Reviewed current dietary habits and pts history   Discussed long-term goals pt hopes to accomplish in RD appointments  Answered pt questions  Coordination of care   Nutrition education - Breakfast ideas, complex carbs, fiber, types of fat  AVS and handouts via [employer]ista    Patient demonstrates understanding.    Expected Engagement: good    Nutrition Goals  1. Consider trying kodiak cakes (protein waffles and pancakes)    2. Reduce zhang intake to 2x/week   3. Incorporate more plant based meals   4. Use new creamer (caramel macchiato) 2x/week   5. Consider starting day with a walk  6. Cut back on bread intake/consider other options (rye, whole wheat)    Vegetarian Protein Ideas  ? Quinoa  ? Nuts  ? Beans  ? Lentils  ? Protein pasta   ? Nutritional yeast  ? Garden of life raw meal powder  ? Liquid  aminos  ? Homemade meats - a taco meat could be made with chopped cauliflower/mushroom as an example   ? Hummus (could do homemade if preferred)  ? Hemp hearts     Vegetarian Meals   [email or web address]      Types of Carbohydrates  [email or web address]     Types of fats  https://www.\[facility]\"".[place].[facility]/nutritionsource/what-should-you-eat/fats-and-cholesterol/types-of-fat/    High Fiber Foods:  Bran cereal, 1/3 cup, 8.6 gm fiber   Cooked kidney beans   cup 7.9 gm  Cooked lentils   cup 7.8 gm  Cooked black beans   cup 7.6 gm  Canned chickpeas   cup 5.3 gm  Baked beans   cup 5.2 gm  Pear 1 5.1 gm  Soybeans   cup 5.1 gm  Quinoa   cup 5 gm  Natan seeds, 1 tbsp 5 gm  Baked sweet potato, with skin 1 medium 4.8 gm  Avocado, half small 4.5 gm  Baked potato, with skin 1 medium 4.4 gm  Cooked frozen green peas   cup 4.4 gm  Bulgur   cup 4.1 gm  Cooked frozen mixed vegetables   cup 4 gm  Raspberries   cup 4 gm  Blackberries   cup 3.8 gm  Almonds 1 oz 3.5 gm  Cooked frozen spinach   cup 3.5 gm  Vegetable or soy darlene 1 each 3.4 gm  Apple 1 medium 3.3 gm  Dried dates 5 pieces 3.3 gm      ADDITIONAL RESOURCES:   The Plate Method  Http://[email or web address]    Protein Sources   http://[employer]/198606.pdf     Mindful Eating  http://[email or web address]     Summary of Volumetrics Eating Plan  [email or web address]     Follow-Up:  Tuesday, October 11th at 9:30 am    Time spent with patient: 37 minutes.  Caty Palomo RD, STEVEND, LD

## 2022-09-13 NOTE — PROGRESS NOTES
"Kirby Patel is a 37 year old male who is being evaluated via a billable video visit.      The patient has been notified of following:     \"This video visit will be conducted via a call between you and your physician/provider. We have found that certain health care needs can be provided without the need for an in-person physical exam.  This service lets us provide the care you need with a video conversation.  If a prescription is necessary we can send it directly to your pharmacy.  If lab work is needed we can place an order for that and you can then stop by our lab to have the test done at a later time.    Video visits are billed at different rates depending on your insurance coverage.  Please reach out to your insurance provider with any questions.    If during the course of the call the physician/provider feels a video visit is not appropriate, you will not be charged for this service.\"    Patient has given verbal consent for Video visit? Yes  How would you like to obtain your AVS? MyChart  If you are dropped from the video visit, the video invite should be resent to: Text to cell phone: [phone]   Will anyone else be joining your video visit? No  {If patient encounters technical issues they should call [phone]      Video-Visit Details    Type of service:  Video Visit    Video Start Time: 9:10 am  Video End Time: 9:37 am    Originating Location (pt. Location): Home    Distant Location (provider location):  [facility] [place]     Platform used for Video Visit: [employer]    During this virtual visit the patient is located in [place], patient verifies this as the location during the entirety of this visit.     Weight Management Nutrition Consultation    Kirby Patel is a 37 year old male presents today for weight management nutrition consultation.  Patient referred by Dr. Velazquez on August 16, 2022.    Patient with Co-morbidities of obesity including:  Type II DM " "no  Renal Failure no  Sleep apnea yes, dx in 2022, using cpap  Hypertension no   Dyslipidemia no  Joint pain - knee pain per pt  Back pain yes  GERD yes    PMH: asthma, depression, anxiety, fatigue    ADHD - recently started on Adderall     Anthropometrics:  Weight 8/16/22: 200 lbs with BMI 34.33    Estimated body mass index is 34.33 kg/m  as calculated from the following:    Height as of 8/16/22: 1.626 m (5' 4\").    Weight as of 8/16/22: 90.7 kg (200 lb).     Medications for Weight Loss:  Ozempic prescribed 8/16/22   has not started yet, needs education on how to take still.    Supplements:  Vitamin D   Iron   B12     Labs 6/29/22  Lipids WNL  A1C WNL   ALT elevated     Labs 4/26/22  Alk Phos Elevated  Lipase low     NUTRITION HISTORY  NKFA  Does not consume beef typically but eating lately to help with iron levels     Per MD note - pt reports weight gain of about 30 lbs over the past 3 years. Contributing factors include COVID pandemic, grad school, desk job and sedentary lifestyle.     Pt has met with a RD 1x - discussed portions, etc. Felt helpful but wants more support.     Pt goals: build at least 1 new solid habit, improve fitness level and learn how to eat for activity, decrease snacking and learn better options    Typical foods consumed  B - 2 eggs, 2 zhang, bread, 2 waffles, coffee  L - frozen food such as pizza or chicken strips  D - Take out, fried foods (Rohan, Chinese, pizza, burger)   Snacks - chips, sweets, popcorn  Fluids: soda, juice, ice tea     Endorses both stress and boredom eating .    PA: walking     Sept 2022:  Being proactive - stocking up on food to prevent running out and getting fast food.    Paying attention to hunger/fulness.     Cutting back on sprite intake.   Switched to a new creamer in coffee.   Drinking more water  If getting [employer], getting chicken sandwich but avoiding pop/fries.  Trying to eat less zhang with breakfast. Cut out waffles.    Went to [employer] - had bagel, " cheese/egg. Usually would get a donut and coffee.    PA: walking more, more work around house (inside/outside), stretching   ? Stamina improving  ? Some pain in back recently, stretching helps    Previous goals:  1. Consider kodiak cakes (protein waffles and pancakes) - Not yet, plans to try.  2. 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    Nutrition Diagnosis  Food and nutrition related knowledge deficit r/t lack of prior exposure to nutrition education aeb pt report and interest in learning    Obesity r/t positive energy balance aeb BMI >30.    Nutrition Intervention  Reviewed current dietary habits and pts history   Discussed long-term goals pt hopes to accomplish in RD appointments  Answered pt questions  Coordination of care   Nutrition education - Breakfast ideas, complex carbs, fiber, types of fat  AVS and handouts via [employer]    Patient demonstrates understanding.    Expected Engagement: good    Nutrition Goals  1. Consider trying kodiak cakes (protein waffles and pancakes)    2. Reduce zhang intake to 2x/week   3. Incorporate more plant based meals   4. Use new creamer (caramel macchiato) 2x/week   5. Consider starting day with a walk  6. Cut back on bread intake/consider other options (rye, whole wheat)    Vegetarian Protein Ideas  ? Quinoa  ? Nuts  ? Beans  ? Lentils  ? Protein pasta   ? Nutritional yeast  ? Garden of life raw meal powder  ? Liquid aminos  ? Homemade meats - a taco meat could be made with chopped cauliflower/mushroom as an example   ? Hummus (could do homemade if preferred)  ? Hemp hearts     Vegetarian Meals   [email or web address]/food-recipes/entrees/      Types of Carbohydrates  [email or web address]     Types of  fats  https://www.[facility].[place].[facility]/nutritionsource/what-should-you-eat/fats-and-cholesterol/types-of-fat/    High Fiber Foods:  Bran cereal, 1/3 cup, 8.6 gm fiber   Cooked kidney beans   cup 7.9 gm  Cooked lentils   cup 7.8 gm  Cooked black beans   cup 7.6 gm  Canned chickpeas   cup 5.3 gm  Baked beans   cup 5.2 gm  Pear 1 5.1 gm  Soybeans   cup 5.1 gm  Quinoa   cup 5 gm  Natan seeds, 1 tbsp 5 gm  Baked sweet potato, with skin 1 medium 4.8 gm  Avocado, half small 4.5 gm  Baked potato, with skin 1 medium 4.4 gm  Cooked frozen green peas   cup 4.4 gm  Bulgur   cup 4.1 gm  Cooked frozen mixed vegetables   cup 4 gm  Raspberries   cup 4 gm  Blackberries   cup 3.8 gm  Almonds 1 oz 3.5 gm  Cooked frozen spinach   cup 3.5 gm  Vegetable or soy darlene 1 each 3.4 gm  Apple 1 medium 3.3 gm  Dried dates 5 pieces 3.3 gm      ADDITIONAL RESOURCES:   The Plate Method  [email or web address]    Protein Sources   [email or web address]     Mindful Eating  [email or web address]     Summary of Volumetrics Eating Plan  [email or web address]     Follow-Up:  Tuesday, October 11th at 9:30 am    Time spent with patient: 37 minutes.  Caty Palomo RD, MPP-D, LD

## 2022-09-26 ENCOUNTER — VIRTUAL VISIT (OUTPATIENT)
Dept: PHARMACY | Facility: CLINIC | Age: 37
End: 2022-09-26
Payer: COMMERCIAL

## 2022-09-26 DIAGNOSIS — F41.9 ANXIETY: ICD-10-CM

## 2022-09-26 DIAGNOSIS — E66.09 CLASS 1 OBESITY DUE TO EXCESS CALORIES WITH SERIOUS COMORBIDITY AND BODY MASS INDEX (BMI) OF 32.0 TO 32.9 IN ADULT: Primary | ICD-10-CM

## 2022-09-26 DIAGNOSIS — J45.30 MILD PERSISTENT ASTHMA WITHOUT COMPLICATION: ICD-10-CM

## 2022-09-26 DIAGNOSIS — K59.00 CONSTIPATION, UNSPECIFIED CONSTIPATION TYPE: ICD-10-CM

## 2022-09-26 DIAGNOSIS — F32.A DEPRESSIVE DISORDER: ICD-10-CM

## 2022-09-26 DIAGNOSIS — G43.719 INTRACTABLE CHRONIC MIGRAINE WITHOUT AURA AND WITHOUT STATUS MIGRAINOSUS: ICD-10-CM

## 2022-09-26 DIAGNOSIS — F90.9 ATTENTION DEFICIT HYPERACTIVITY DISORDER (ADHD), UNSPECIFIED ADHD TYPE: ICD-10-CM

## 2022-09-26 DIAGNOSIS — E66.811 CLASS 1 OBESITY DUE TO EXCESS CALORIES WITH SERIOUS COMORBIDITY AND BODY MASS INDEX (BMI) OF 32.0 TO 32.9 IN ADULT: Primary | ICD-10-CM

## 2022-09-26 DIAGNOSIS — K21.9 GASTROESOPHAGEAL REFLUX DISEASE, UNSPECIFIED WHETHER ESOPHAGITIS PRESENT: ICD-10-CM

## 2022-09-26 PROCEDURE — 99607 MTMS BY PHARM ADDL 15 MIN: CPT | Performed by: PHARMACIST

## 2022-09-26 PROCEDURE — 99605 MTMS BY PHARM NP 15 MIN: CPT | Performed by: PHARMACIST

## 2022-09-26 RX ORDER — FLUTICASONE PROPIONATE AND SALMETEROL 50; 250 UG/1; UG/1
1 POWDER RESPIRATORY (INHALATION) 2 TIMES DAILY
COMMUNITY
Start: 2022-09-22 | End: 2024-06-20

## 2022-09-26 RX ORDER — DEXTROAMPHETAMINE SACCHARATE, AMPHETAMINE ASPARTATE MONOHYDRATE, DEXTROAMPHETAMINE SULFATE AND AMPHETAMINE SULFATE 2.5; 2.5; 2.5; 2.5 MG/1; MG/1; MG/1; MG/1
10 CAPSULE, EXTENDED RELEASE ORAL 3 TIMES DAILY
COMMUNITY
Start: 2022-09-06

## 2022-09-26 NOTE — PATIENT INSTRUCTIONS
"Recommendations from today's MTM visit:                                                    MTM (medication therapy management) is a service provided by a clinical pharmacist designed to help you get the most of out of your medicines.   Today we reviewed what your medicines are for, how to know if they are working, that your medicines are safe and how to make your medicine regimen as easy as possible.      1. Continue current regimen.     2. Follow up with dietitian as planned and Dr. Caroline Coles in mid November.     3. If you do decide to start Ozempic in future, please reach out to the clinic and let us know.     Follow-up: as needed with Medication Therapy Management (MTM)  Pharmacist in future or yearly otherwise.     It was great speaking with you today.  I value your experience and would be very thankful for your time in providing feedback in our clinic survey. In the next few days, you may receive an email or text message from [facility] [employer] with a link to a survey related to your  clinical pharmacist.\"     My Clinical Pharmacist's contact information:                                                      Please feel free to contact me with any questions or concerns you have.      Lauren Bloch, PharmD  Medication Therapy Management Pharmacist   [facility] [place]             "

## 2022-10-11 NOTE — PROGRESS NOTES
"Kirby Patel is a 37 year old male who is being evaluated via a billable video visit.      The patient has been notified of following:     \"This video visit will be conducted via a call between you and your physician/provider. We have found that certain health care needs can be provided without the need for an in-person physical exam.  This service lets us provide the care you need with a video conversation.  If a prescription is necessary we can send it directly to your pharmacy.  If lab work is needed we can place an order for that and you can then stop by our lab to have the test done at a later time.    Video visits are billed at different rates depending on your insurance coverage.  Please reach out to your insurance provider with any questions.    If during the course of the call the physician/provider feels a video visit is not appropriate, you will not be charged for this service.\"    Patient has given verbal consent for Video visit? Yes  How would you like to obtain your AVS? MyChart  If you are dropped from the video visit, the video invite should be resent to: Text to cell phone: [phone]   Will anyone else be joining your video visit? No  {If patient encounters technical issues they should call [phone]      Video-Visit Details    Type of service:  Video Visit    Video Start Time: 10:01 am  Video End Time: 10:35 am    Originating Location (pt. Location): Home    Distant Location (provider location):  [facility] [place]     Platform used for Video Visit: [employer]    During this virtual visit the patient is located in [place], patient verifies this as the location during the entirety of this visit.     Weight Management Nutrition Consultation    Kirby Patel is a 37 year old male presents today for weight management nutrition consultation.  Patient referred by Dr. Velazquez on August 16, 2022.    Patient with Co-morbidities of obesity including:  Type II DM " "no  Renal Failure no  Sleep apnea yes, dx in 2022, using cpap  Hypertension no   Dyslipidemia no  Joint pain - knee pain per pt  Back pain yes  GERD yes    PMH: asthma, depression, anxiety, fatigue    ADHD - recently started on Adderall     Anthropometrics:  Weight 8/16/22: 200 lbs with BMI 34.33    Estimated body mass index is 34.33 kg/m  as calculated from the following:    Height as of 8/16/22: 1.626 m (5' 4\").    Weight as of 8/16/22: 90.7 kg (200 lb).     Current weight: did not check in today, pt reported losing 10 lbs at last appt    Medications for Weight Loss:  Ozempic prescribed 8/16/22   - Did not start, doing well without per MTM note    Supplements:  Vitamin D   Iron   B12     Labs 6/29/22  Lipids WNL  A1C WNL   ALT elevated     Labs 4/26/22  Alk Phos Elevated  Lipase low     NUTRITION HISTORY  NKFA  Does not consume beef typically but eating lately to help with iron levels     Per MD note - pt reports weight gain of about 30 lbs over the past 3 years. Contributing factors include COVID pandemic, grad school, desk job and sedentary lifestyle.     Pt has met with a RD 1x - discussed portions, etc. Felt helpful but wants more support.     Pt goals: build at least 1 new solid habit, improve fitness level and learn how to eat for activity, decrease snacking and learn better options    Typical foods consumed  B - 2 eggs, 2 zhang, bread, 2 waffles, coffee  L - frozen food such as pizza or chicken strips  D - Take out, fried foods (Rohan, Chinese, pizza, burger)   Snacks - chips, sweets, popcorn  Fluids: soda, juice, ice tea     Endorses both stress and boredom eating .    PA: walking     Sept 2022:  Being proactive - stocking up on food to prevent running out and getting fast food.    Paying attention to hunger/fulness.     Cutting back on sprite intake.   Switched to a new creamer in coffee.   Drinking more water  If getting McDonalds, getting chicken sandwich but avoiding pop/fries.  Trying to eat less zhang " with breakfast. Cut out waffles.    Went to [employer] - had bagel, cheese/egg. Usually would get a donut and coffee.    PA: walking more, more work around house (inside/outside), stretching   ? Stamina improving  ? Some pain in back recently, stretching helps    Oct 2022:    Eating 2 meals/day lately, breakfast and dinner. Finding he gets really hungry by 2nd meal. Feels his ADHD might be getting in the way of eating. 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     Occ going to [employer] to get a salad for lunch.    Recent recall  B around 10 am- biscuit sandwich from [employer] with his own coffee from home with agave as sweetener   D around 6 pm - [employer] miso soup cup  9 pm - still hungry, made two Nutella sandwiches     Fluids: started drinking some soda again (his partner accidentally bought regular instead of diet and doesn t like it but Kirby didn t want to waste it). Wants to stop drinking, breaking out lately and also interfering with sleep. Wants to sip on diet Snapple tea and drink more water instead.     Constipation improving per Pharm note - uses milk of magnesia prn. Working on adding more fiber to diet and increasing hydration.     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    Nutrition Diagnosis  Food and nutrition related knowledge deficit r/t lack of prior exposure to nutrition education aeb pt report and interest in learning    Obesity r/t positive energy balance aeb BMI >30.    Nutrition Intervention  Reviewed current dietary habits and pts history   Discussed long-term goals pt hopes to accomplish in RD appointments  Answered pt questions  Coordination of care   Nutrition education - Breakfast ideas, complex carbs, fiber, types of fat  AVS and handouts  via [employer]    Patient demonstrates understanding.    Expected Engagement: good    Nutrition Goals  1. Aim for dinner no later than 7 pm   2. Aim to grab something on campus after class or making food once home (if not bringing lunch)  3. Look into bringing food to work when at [employer] that doesn t have to be kept in fridge (pack own ice pack or room temperature food). Consider utilizing fridge at [place]  4. Aim to get [employer] salads on Tuesdays   5. Aim to switch from pop to diet Snapple tea/water.   6. Aim to be in bed by 11 pm    Vegetarian Protein Ideas  ? Quinoa  ? Nuts  ? Beans  ? Lentils  ? Protein pasta   ? Nutritional yeast  ? Garden of life raw meal powder  ? Liquid aminos  ? Homemade meats - a taco meat could be made with chopped cauliflower/mushroom as an example   ? Hummus (could do homemade if preferred)  ? Hemp hearts     Vegetarian Meals   https://[employer]/category/food-recipes/entrees/      Types of Carbohydrates  [email or web address]     Types of fats  https://www.[facility].[place].edu/nutritionsource/what-should-you-eat/fats-and-cholesterol/types-of-fat/    High Fiber Foods:  Bran cereal, 1/3 cup, 8.6 gm fiber   Cooked kidney beans   cup 7.9 gm  Cooked lentils   cup 7.8 gm  Cooked black beans   cup 7.6 gm  Canned chickpeas   cup 5.3 gm  Baked beans   cup 5.2 gm  Pear 1 5.1 gm  Soybeans   cup 5.1 gm  Quinoa   cup 5 gm  Natan seeds, 1 tbsp 5 gm  Baked sweet potato, with skin 1 medium 4.8 gm  Avocado, half small 4.5 gm  Baked potato, with skin 1 medium 4.4 gm  Cooked frozen green peas   cup 4.4 gm  Bulgur   cup 4.1 gm  Cooked frozen mixed vegetables   cup 4 gm  Raspberries   cup 4 gm  Blackberries   cup 3.8 gm  Almonds 1 oz 3.5 gm  Cooked frozen spinach   cup 3.5 gm  Vegetable or soy darlene 1 each 3.4 gm  Apple 1 medium 3.3 gm  Dried dates 5 pieces 3.3 gm      ADDITIONAL RESOURCES:   The Plate Method  [email or web address]    Protein Sources   [email or web address]      Mindful Eating  [email or web address]     Summary of Volumetrics Eating Plan  [email or web address]     Follow-Up:  Thursday, November 17th at 9:30 am    Time spent with patient: 34 minutes.  Caty Palomo RD, MPP-D, LD

## 2022-10-20 ENCOUNTER — VIRTUAL VISIT (OUTPATIENT)
Dept: ENDOCRINOLOGY | Facility: CLINIC | Age: 37
End: 2022-10-20
Payer: COMMERCIAL

## 2022-10-20 DIAGNOSIS — E66.09 CLASS 1 OBESITY DUE TO EXCESS CALORIES WITH SERIOUS COMORBIDITY AND BODY MASS INDEX (BMI) OF 32.0 TO 32.9 IN ADULT: ICD-10-CM

## 2022-10-20 DIAGNOSIS — E66.811 CLASS 1 OBESITY DUE TO EXCESS CALORIES WITH SERIOUS COMORBIDITY AND BODY MASS INDEX (BMI) OF 32.0 TO 32.9 IN ADULT: ICD-10-CM

## 2022-10-20 DIAGNOSIS — Z71.3 NUTRITIONAL COUNSELING: Primary | ICD-10-CM

## 2022-10-20 PROCEDURE — 97803 MED NUTRITION INDIV SUBSEQ: CPT | Mod: 95 | Performed by: DIETITIAN, REGISTERED

## 2022-10-20 NOTE — PATIENT INSTRUCTIONS
Nutrition Goals  Aim for dinner no later than 7 pm   Aim to grab something on campus after class or making food once home (if not bringing lunch)  Look into bringing food to work when at [employer] that doesn t have to be kept in fridge (pack own ice pack or room temperature food). Consider utilizing fridge at [place]  Aim to get [employer] salads on Tuesdays   Aim to switch from pop to diet Snapple tea/water.   Aim to be in bed by 11 pm    Vegetarian Protein Ideas  Quinoa  Nuts  Beans  Lentils  Protein pasta   Nutritional yeast  Garden of life raw meal powder  Liquid aminos  Homemade meats - a taco meat could be made with chopped cauliflower/mushroom as an example   Hummus (could do homemade if preferred)  Hemp hearts     Vegetarian Meals   https://[employer]/category/food-recipes/entrees/      Types of Carbohydrates  [email or web address]     Types of fats  https://www.[facility].[place].edu/nutritionsource/what-should-you-eat/fats-and-cholesterol/types-of-fat/    High Fiber Foods:  Bran cereal, 1/3 cup, 8.6 gm fiber   Cooked kidney beans   cup 7.9 gm  Cooked lentils   cup 7.8 gm  Cooked black beans   cup 7.6 gm  Canned chickpeas   cup 5.3 gm  Baked beans   cup 5.2 gm  Pear 1 5.1 gm  Soybeans   cup 5.1 gm  Quinoa   cup 5 gm  Natan seeds, 1 tbsp 5 gm  Baked sweet potato, with skin 1 medium 4.8 gm  Avocado, half small 4.5 gm  Baked potato, with skin 1 medium 4.4 gm  Cooked frozen green peas   cup 4.4 gm  Bulgur   cup 4.1 gm  Cooked frozen mixed vegetables   cup 4 gm  Raspberries   cup 4 gm  Blackberries   cup 3.8 gm  Almonds 1 oz 3.5 gm  Cooked frozen spinach   cup 3.5 gm  Vegetable or soy darlene 1 each 3.4 gm  Apple 1 medium 3.3 gm  Dried dates 5 pieces 3.3 gm      ADDITIONAL RESOURCES:   The Plate Method  [email or web address]    Protein Sources   [email or web address]     Mindful Eating  [email or web address]     Summary of Volumetrics Eating Plan  [email or web address]      Follow-Up:  Thursday, November 17th at 9:30 am    RUPERTO Rider, RD, LD  [facility] #: [phone]

## 2022-10-20 NOTE — LETTER
"10/20/2022       RE: Kirby Patel  [address]  [facility] [address]     Dear Colleague,    Thank you for referring your patient, Kirby Patel, to the [facility] [place] [facility]. Please see a copy of my visit note below.    Kirby Patel is a 37 year old male who is being evaluated via a billable video visit.      The patient has been notified of following:     \"This video visit will be conducted via a call between you and your physician/provider. We have found that certain health care needs can be provided without the need for an in-person physical exam.  This service lets us provide the care you need with a video conversation.  If a prescription is necessary we can send it directly to your pharmacy.  If lab work is needed we can place an order for that and you can then stop by our lab to have the test done at a later time.    Video visits are billed at different rates depending on your insurance coverage.  Please reach out to your insurance provider with any questions.    If during the course of the call the physician/provider feels a video visit is not appropriate, you will not be charged for this service.\"    Patient has given verbal consent for Video visit? Yes  How would you like to obtain your AVS? MyChart  If you are dropped from the video visit, the video invite should be resent to: Text to cell phone: [phone]   Will anyone else be joining your video visit? No  {If patient encounters technical issues they should call [phone]      Video-Visit Details    Type of service:  Video Visit    Video Start Time: 10:01 am  Video End Time: 10:35 am    Originating Location (pt. Location): Home    Distant Location (provider location):  [facility]     Platform used for Video Visit: [employer]King    During this virtual visit the patient is located in [place], patient " "verifies this as the location during the entirety of this visit.     Weight Management Nutrition Consultation    Kirby Patel is a 37 year old male presents today for weight management nutrition consultation.  Patient referred by Dr. Velazquez on August 16, 2022.    Patient with Co-morbidities of obesity including:  Type II DM no  Renal Failure no  Sleep apnea yes, dx in 2022, using cpap  Hypertension no   Dyslipidemia no  Joint pain - knee pain per pt  Back pain yes  GERD yes    PMH: asthma, depression, anxiety, fatigue    ADHD - recently started on Adderall     Anthropometrics:  Weight 8/16/22: 200 lbs with BMI 34.33    Estimated body mass index is 34.33 kg/m  as calculated from the following:    Height as of 8/16/22: 1.626 m (5' 4\").    Weight as of 8/16/22: 90.7 kg (200 lb).     Current weight: did not check in today, pt reported losing 10 lbs at last appt    Medications for Weight Loss:  Ozempic prescribed 8/16/22   - Did not start, doing well without per MTM note    Supplements:  Vitamin D   Iron   B12     Labs 6/29/22  Lipids WNL  A1C WNL   ALT elevated     Labs 4/26/22  Alk Phos Elevated  Lipase low     NUTRITION HISTORY  NKFA  Does not consume beef typically but eating lately to help with iron levels     Per MD note - pt reports weight gain of about 30 lbs over the past 3 years. Contributing factors include COVID pandemic, grad school, desk job and sedentary lifestyle.     Pt has met with a RD 1x - discussed portions, etc. Felt helpful but wants more support.     Pt goals: build at least 1 new solid habit, improve fitness level and learn how to eat for activity, decrease snacking and learn better options    Typical foods consumed  B - 2 eggs, 2 zhang, bread, 2 waffles, coffee  L - frozen food such as pizza or chicken strips  D - Take out, fried foods (French, Chinese, pizza, burger)   Snacks - chips, sweets, popcorn  Fluids: soda, juice, ice tea     Endorses both stress and boredom eating .    PA: walking "     Sept 2022:  Being proactive - stocking up on food to prevent running out and getting fast food.    Paying attention to hunger/fulness.     Cutting back on sprite intake.   Switched to a new creamer in coffee.   Drinking more water  If getting [employer]onal[employer], getting chicken sandwich but avoiding pop/fries.  Trying to eat less zhang with breakfast. Cut out waffles.    Went to [employer] - had bagel, cheese/egg. Usually would get a donut and coffee.    PA: walking more, more work around house (inside/outside), stretching   ? Stamina improving  ? Some pain in back recently, stretching helps    Oct 2022:    Eating 2 meals/day lately, breakfast and dinner. Finding he gets really hungry by 2nd meal. Feels his ADHD might be getting in the way of eating. 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     Occ going to [employer] to get a salad for lunch.    Recent recall  B around 10 am- biscuit sandwich from [employer] with his own coffee from home with agave as sweetener   D around 6 pm - [employer] miso soup cup  9 pm - still hungry, made two Nutella sandwiches     Fluids: started drinking some soda again (his partner accidentally bought regular instead of diet and doesn t like it but Kirby didn t want to waste it). Wants to stop drinking, breaking out lately and also interfering with sleep. Wants to sip on diet Snapple tea and drink more water instead.     Constipation improving per Pharm note - uses milk of magnesia prn. Working on adding more fiber to diet and increasing hydration.     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    Nutrition Diagnosis  Food and nutrition related knowledge deficit r/t lack of prior exposure to nutrition education aeb pt report and interest in  learning    Obesity r/t positive energy balance aeb BMI >30.    Nutrition Intervention  Reviewed current dietary habits and pts history   Discussed long-term goals pt hopes to accomplish in RD appointments  Answered pt questions  Coordination of care   Nutrition education - Breakfast ideas, complex carbs, fiber, types of fat  AVS and handouts via [employer]    Patient demonstrates understanding.    Expected Engagement: good    Nutrition Goals  1. Aim for dinner no later than 7 pm   2. Aim to grab something on campus after class or making food once home (if not bringing lunch)  3. Look into bringing food to work when at [employer] that doesn t have to be kept in fridge (pack own ice pack or room temperature food). Consider utilizing fridge at [place]  4. Aim to get [employer] salads on Tuesdays   5. Aim to switch from pop to diet Snapple tea/water.   6. Aim to be in bed by 11 pm    Vegetarian Protein Ideas  ? Quinoa  ? Nuts  ? Beans  ? Lentils  ? Protein pasta   ? Nutritional yeast  ? Garden of life raw meal powder  ? Liquid aminos  ? Homemade meats - a taco meat could be made with chopped cauliflower/mushroom as an example   ? Hummus (could do homemade if preferred)  ? Hemp hearts     Vegetarian Meals   https://[employer]/category/food-recipes/entrees/      Types of Carbohydrates  [email or web address]     Types of fats  https://www.hsp.[place].edu/nutritionsource/what-should-you-eat/fats-and-cholesterol/types-of-fat/    High Fiber Foods:  Bran cereal, 1/3 cup, 8.6 gm fiber   Cooked kidney beans   cup 7.9 gm  Cooked lentils   cup 7.8 gm  Cooked black beans   cup 7.6 gm  Canned chickpeas   cup 5.3 gm  Baked beans   cup 5.2 gm  Pear 1 5.1 gm  Soybeans   cup 5.1 gm  Quinoa   cup 5 gm  Natan seeds, 1 tbsp 5 gm  Baked sweet potato, with skin 1 medium 4.8 gm  Avocado, half small 4.5 gm  Baked potato, with skin 1 medium 4.4 gm  Cooked frozen green peas   cup 4.4 gm  Bulgur   cup 4.1 gm  Cooked frozen mixed vegetables   cup  4 gm  Raspberries   cup 4 gm  Blackberries   cup 3.8 gm  Almonds 1 oz 3.5 gm  Cooked frozen spinach   cup 3.5 gm  Vegetable or soy darlene 1 each 3.4 gm  Apple 1 medium 3.3 gm  Dried dates 5 pieces 3.3 gm      ADDITIONAL RESOURCES:   The Plate Method  [email or web address]    Protein Sources   [email or web address]     Mindful Eating  [email or web address]     Summary of Volumetrics Eating Plan  [email or web address]     Follow-Up:  Thursday, November 17th at 9:30 am    Time spent with patient: 34 minutes.  Caty Palomo RD, MPP-D, LD

## 2022-11-17 ENCOUNTER — VIRTUAL VISIT (OUTPATIENT)
Dept: ENDOCRINOLOGY | Facility: CLINIC | Age: 37
End: 2022-11-17
Payer: COMMERCIAL

## 2022-11-17 VITALS — BODY MASS INDEX: 32.27 KG/M2 | WEIGHT: 188 LBS

## 2022-11-17 DIAGNOSIS — E66.09 CLASS 1 OBESITY DUE TO EXCESS CALORIES WITH SERIOUS COMORBIDITY AND BODY MASS INDEX (BMI) OF 32.0 TO 32.9 IN ADULT: ICD-10-CM

## 2022-11-17 DIAGNOSIS — Z71.3 NUTRITIONAL COUNSELING: Primary | ICD-10-CM

## 2022-11-17 DIAGNOSIS — E66.811 CLASS 1 OBESITY DUE TO EXCESS CALORIES WITH SERIOUS COMORBIDITY AND BODY MASS INDEX (BMI) OF 32.0 TO 32.9 IN ADULT: ICD-10-CM

## 2022-11-17 PROCEDURE — 97803 MED NUTRITION INDIV SUBSEQ: CPT | Mod: 95 | Performed by: DIETITIAN, REGISTERED

## 2022-11-17 NOTE — PATIENT INSTRUCTIONS
Nutrition Goals  1. Aim to maintain physical activity as able/tolerated in winter months  2. Mindful with portions and comfort foods   3. Call to get parts for CPAP  4. Consider dermatologist if acne not improving     Keep up the hard work!    Vegetarian Protein Ideas  Quinoa  Nuts  Beans  Lentils  Protein pasta   Nutritional yeast  Garden of life raw meal powder  Liquid aminos  Homemade meats - a taco meat could be made with chopped cauliflower/mushroom as an example   Hummus (could do homemade if preferred)  Hemp hearts     Vegetarian Meals   https://[employer]/category/food-recipes/entrees/      Types of Carbohydrates  [email or web address]     Types of fats  https://www.[facility].[employer].edu/nutritionsource/what-should-you-eat/fats-and-cholesterol/types-of-fat/    High Fiber Foods:  Bran cereal, 1/3 cup, 8.6 gm fiber   Cooked kidney beans   cup 7.9 gm  Cooked lentils   cup 7.8 gm  Cooked black beans   cup 7.6 gm  Canned chickpeas   cup 5.3 gm  Baked beans   cup 5.2 gm  Pear 1 5.1 gm  Soybeans   cup 5.1 gm  Quinoa   cup 5 gm  Natan seeds, 1 tbsp 5 gm  Baked sweet potato, with skin 1 medium 4.8 gm  Avocado, half small 4.5 gm  Baked potato, with skin 1 medium 4.4 gm  Cooked frozen green peas   cup 4.4 gm  Bulgur   cup 4.1 gm  Cooked frozen mixed vegetables   cup 4 gm  Raspberries   cup 4 gm  Blackberries   cup 3.8 gm  Almonds 1 oz 3.5 gm  Cooked frozen spinach   cup 3.5 gm  Vegetable or soy darlene 1 each 3.4 gm  Apple 1 medium 3.3 gm  Dried dates 5 pieces 3.3 gm      ADDITIONAL RESOURCES:   The Plate Method  Http://[email or web address]    Protein Sources   http://H-art (WPP)/[email or web address]     Mindful Eating  http://H-art ([email or web address]     Summary of Volumetrics Eating Plan  [email or web address]     Follow-Up:  Thursday, December 15th at 11:30 am    RUPERTO Rider, RD, LD  Clinic #: [phone]

## 2022-11-17 NOTE — PROGRESS NOTES
"Kirby Patel is a 37 year old male who is being evaluated via a billable video visit.      The patient has been notified of following:     \"This video visit will be conducted via a call between you and your physician/provider. We have found that certain health care needs can be provided without the need for an in-person physical exam.  This service lets us provide the care you need with a video conversation.  If a prescription is necessary we can send it directly to your pharmacy.  If lab work is needed we can place an order for that and you can then stop by our lab to have the test done at a later time.    Video visits are billed at different rates depending on your insurance coverage.  Please reach out to your insurance provider with any questions.    If during the course of the call the physician/provider feels a video visit is not appropriate, you will not be charged for this service.\"    Patient has given verbal consent for Video visit? Yes  How would you like to obtain your AVS? MyChart  If you are dropped from the video visit, the video invite should be resent to: Text to cell phone: [phone]   Will anyone else be joining your video visit? No  {If patient encounters technical issues they should call [phone]      Video-Visit Details    Type of service:  Video Visit    Video Start Time: 9:35 am  Video End Time: 10:08 pm    Originating Location (pt. Location): [facility]    Distant Location (provider location):  Offsite     Platform used for Video Visit: [employer]    During this virtual visit the patient is located in [place], patient verifies this as the location during the entirety of this visit.     Weight Management Nutrition Consultation    Kirby Patel is a 37 year old male presents today for weight management nutrition consultation.  Patient referred by Dr. Velazquez on August 16, 2022.    Patient with Co-morbidities of obesity including:  Type II DM no  Renal Failure no  Sleep apnea " "yes, dx in 2022, using cpap  Hypertension no   Dyslipidemia no  Joint pain - knee pain per pt  Back pain yes  GERD yes    PMH: asthma, depression, anxiety, fatigue    ADHD - recently started on Adderall     Anthropometrics:  Weight 8/16/22: 200 lbs with BMI 34.33    Estimated body mass index is 32.27 kg/m  as calculated from the following:    Height as of 8/16/22: 1.626 m (5' 4\").    Weight as of this encounter: 85.3 kg (188 lb).     Current weight: 188 lbs, pt report    Medications for Weight Loss:  Ozempic prescribed 8/16/22   - Did not start, doing well without per MTM note    Supplements:  Vitamin D   Iron   B12     Labs 6/29/22  Lipids WNL  A1C WNL   ALT elevated     Labs 4/26/22  Alk Phos Elevated  Lipase low     NUTRITION HISTORY  NKFA  Does not consume beef typically but eating lately to help with iron levels     Per MD note - pt reports weight gain of about 30 lbs over the past 3 years. Contributing factors include COVID pandemic, grad school, desk job and sedentary lifestyle.     Pt has met with a RD 1x - discussed portions, etc. Felt helpful but wants more support.     Pt goals: build at least 1 new solid habit, improve fitness level and learn how to eat for activity, decrease snacking and learn better options    Typical foods consumed  B - 2 eggs, 2 zhang, bread, 2 waffles, coffee  L - frozen food such as pizza or chicken strips  D - Take out, fried foods ([place], Chinese, pizza, burger)   Snacks - chips, sweets, popcorn  Fluids: soda, juice, ice tea     Endorses both stress and boredom eating .    PA: walking     Sept 2022:  Being proactive - stocking up on food to prevent running out and getting fast food.    Paying attention to hunger/fulness.     Cutting back on sprite intake.   Switched to a new creamer in coffee.   Drinking more water  If getting McDonalds, getting chicken sandwich but avoiding pop/fries.  Trying to eat less zhang with breakfast. Cut out waffles.    Went to [employer] - had bagel, " cheese/egg. Usually would get a donut and coffee.    PA: walking more, more work around house (inside/outside), stretching   ? Stamina improving  ? Some pain in back recently, stretching helps    Oct 2022:    Eating 2 meals/day lately, breakfast and dinner. Finding he gets really hungry by 2nd meal. Feels his ADHD might be getting in the way of eating. 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     Occ going to [employer] to get a salad for lunch.    Recent recall  B around 10 am- biscuit sandwich from [employer] s with his own coffee from home with agave as sweetener   D around 6 pm - [employer] miso soup cup  9 pm - still hungry, made two Nutella sandwiches     Fluids: started drinking some soda again (his partner accidentally bought regular instead of diet and doesn t like it but Kirby didn t want to waste it). Wants to stop drinking, breaking out lately and also interfering with sleep. Wants to sip on diet Snapple tea and drink more water instead.     Constipation improving per Pharm note - uses milk of magnesia prn. Working on adding more fiber to diet and increasing hydration.     Nov 2022:  Things going really well per pt.     Down about 17 lbs (got up to 205 lbs per pt, now at 188 lbs). Partner also losing weight (on wevogy) and down 40 lbs per pt. Working on lifestyle changes together.     Stopped drinking pop but still breaking out. Was doing a pint of ice cream almost nightly he realized - since stopped. Also was doing 4 bowls in cereal occ when feeling hungry in night. Discussed possible reasons for acne and recommended following up with dermatologist if concerned further. Feels he might be consuming too much sugar still.     Trying to eat dinner earlier. Doing more meal planning. Cooking on Mondays and again Thursdays then having leftovers - made teriyaki chicken and vegetables, tuna sandwiches. Decreased take out - doing 1x/week on often and making better  choices.     Bought more fruit - grapes, bananas    Breakfast - cooking at home OR [employer] s sandwich     BM: constipation improving, increased fiber intake    PA: walking around campus and with dogs.  Mobility improving. Also feels he is running out of breath less often.    Previous goals:  1. Aim for dinner no later than 7 pm   2. Aim to grab something on campus after class or making food once home (if not bringing lunch)  3. Look into bringing food to work when at [employer] that doesn t have to be kept in fridge (pack own ice pack or room temperature food). Consider utilizing fridge at [place]  4. Aim to get [employer] salads on Tuesdays   5. Aim to switch from pop to diet Snapple tea/water. - Met, doing more teas and water.   6. Aim to be in bed by 11 pm - In progress, sleep has been bad. 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    Nutrition Diagnosis  Food and nutrition related knowledge deficit r/t lack of prior exposure to nutrition education aeb pt report and interest in learning    Obesity r/t positive energy balance aeb BMI >30.    Nutrition Intervention  Reviewed and modified previous goals   Answered pt questions  Coordination of care   Nutrition education - Breakfast ideas, complex carbs, fiber, types of fat  AVS and handouts via [employer]    Patient demonstrates understanding.    Expected Engagement: good    Nutrition Goals  1. Aim to maintain physical activity as able/tolerated in winter months  2. Mindful with portions and comfort foods   3. Call to get parts for CPAP  4. Consider dermatologist if acne not improving     Keep up the hard work!    Vegetarian Protein Ideas  ? Quinoa  ? Nuts  ? Beans  ? Lentils  ? Protein pasta   ? Nutritional yeast  ? Garden of life raw  meal powder  ? Liquid aminos  ? Homemade meats - a taco meat could be made with chopped cauliflower/mushroom as an example   ? Hummus (could do homemade if preferred)  ? Hemp hearts     Vegetarian Meals   https://[employer]/category/food-recipes/entrees/      Types of Carbohydrates  [email or web address]     Types of fats  https://www.[facility].[place].[facility]/nutritionsource/what-should-you-eat/fats-and-cholesterol/types-of-fat/    High Fiber Foods:  Bran cereal, 1/3 cup, 8.6 gm fiber   Cooked kidney beans   cup 7.9 gm  Cooked lentils   cup 7.8 gm  Cooked black beans   cup 7.6 gm  Canned chickpeas   cup 5.3 gm  Baked beans   cup 5.2 gm  Pear 1 5.1 gm  Soybeans   cup 5.1 gm  Quinoa   cup 5 gm  Natan seeds, 1 tbsp 5 gm  Baked sweet potato, with skin 1 medium 4.8 gm  Avocado, half small 4.5 gm  Baked potato, with skin 1 medium 4.4 gm  Cooked frozen green peas   cup 4.4 gm  Bulgur   cup 4.1 gm  Cooked frozen mixed vegetables   cup 4 gm  Raspberries   cup 4 gm  Blackberries   cup 3.8 gm  Almonds 1 oz 3.5 gm  Cooked frozen spinach   cup 3.5 gm  Vegetable or soy darlene 1 each 3.4 gm  Apple 1 medium 3.3 gm  Dried dates 5 pieces 3.3 gm      ADDITIONAL RESOURCES:   The Plate Method  [email or web address]    Protein Sources   http://[employer]/813642.pdf     Mindful Eating  http://[employer]/165844.pdf     Summary of Volumetrics Eating Plan  [email or web address]     Follow-Up:  Thursday, December 15th at 11:30 am    Time spent with patient: 33 minutes.  Caty Palomo RD, STEVEND, LD

## 2022-11-17 NOTE — LETTER
"11/17/2022       RE: Kirby Patel  [address]  [facility] [address]     Dear Colleague,    Thank you for referring your patient, Kirby Patel, to the [facility] [place] [facility]. Please see a copy of my visit note below.    Kirby Patel is a 37 year old male who is being evaluated via a billable video visit.      The patient has been notified of following:     \"This video visit will be conducted via a call between you and your physician/provider. We have found that certain health care needs can be provided without the need for an in-person physical exam.  This service lets us provide the care you need with a video conversation.  If a prescription is necessary we can send it directly to your pharmacy.  If lab work is needed we can place an order for that and you can then stop by our lab to have the test done at a later time.    Video visits are billed at different rates depending on your insurance coverage.  Please reach out to your insurance provider with any questions.    If during the course of the call the physician/provider feels a video visit is not appropriate, you will not be charged for this service.\"    Patient has given verbal consent for Video visit? Yes  How would you like to obtain your AVS? MyChart  If you are dropped from the video visit, the video invite should be resent to: Text to cell phone: [phone]   Will anyone else be joining your video visit? No  {If patient encounters technical issues they should call [phone]      Video-Visit Details    Type of service:  Video Visit    Video Start Time: 9:35 am  Video End Time: 10:08 pm    Originating Location (pt. Location): [facility] [place] [facility]    Distant Location (provider location):  Offsite     Platform used for Video Visit: [employer]    During this virtual visit the patient is located in [place], patient verifies this as the location " "during the entirety of this visit.     Weight Management Nutrition Consultation    Kirby Patel is a 37 year old male presents today for weight management nutrition consultation.  Patient referred by Dr. Velazquez on August 16, 2022.    Patient with Co-morbidities of obesity including:  Type II DM no  Renal Failure no  Sleep apnea yes, dx in 2022, using cpap  Hypertension no   Dyslipidemia no  Joint pain - knee pain per pt  Back pain yes  GERD yes    PMH: asthma, depression, anxiety, fatigue    ADHD - recently started on Adderall     Anthropometrics:  Weight 8/16/22: 200 lbs with BMI 34.33    Estimated body mass index is 32.27 kg/m  as calculated from the following:    Height as of 8/16/22: 1.626 m (5' 4\").    Weight as of this encounter: 85.3 kg (188 lb).     Current weight: 188 lbs, pt report    Medications for Weight Loss:  Ozempic prescribed 8/16/22   - Did not start, doing well without per MTM note    Supplements:  Vitamin D   Iron   B12     Labs 6/29/22  Lipids WNL  A1C WNL   ALT elevated     Labs 4/26/22  Alk Phos Elevated  Lipase low     NUTRITION HISTORY  NKFA  Does not consume beef typically but eating lately to help with iron levels     Per MD note - pt reports weight gain of about 30 lbs over the past 3 years. Contributing factors include COVID pandemic, grad school, desk job and sedentary lifestyle.     Pt has met with a RD 1x - discussed portions, etc. Felt helpful but wants more support.     Pt goals: build at least 1 new solid habit, improve fitness level and learn how to eat for activity, decrease snacking and learn better options    Typical foods consumed  B - 2 eggs, 2 zhang, bread, 2 waffles, coffee  L - frozen food such as pizza or chicken strips  D - Take out, fried foods (Rohan, Chinese, pizza, burger)   Snacks - chips, sweets, popcorn  Fluids: soda, juice, ice tea     Endorses both stress and boredom eating .    PA: walking     Sept 2022:  Being proactive - stocking up on food to prevent " running out and getting fast food.    Paying attention to hunger/fulness.     Cutting back on sprite intake.   Switched to a new creamer in coffee.   Drinking more water  If getting [employer], getting chicken sandwich but avoiding pop/fries.  Trying to eat less zhang with breakfast. Cut out waffles.    Went to [employer] - had bagel, cheese/egg. Usually would get a donut and coffee.    PA: walking more, more work around house (inside/outside), stretching   ? Stamina improving  ? Some pain in back recently, stretching helps    Oct 2022:    Eating 2 meals/day lately, breakfast and dinner. Finding he gets really hungry by 2nd meal. Feels his ADHD might be getting in the way of eating. 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     Occ going to [facility] to get a salad for lunch.    Recent recall  B around 10 am- biscuit sandwich from [employer] with his own coffee from home with agave as sweetener   D around 6 pm - [employer] miso soup cup  9 pm - still hungry, made two Nutella sandwiches     Fluids: started drinking some soda again (his partner accidentally bought regular instead of diet and doesn t like it but Kirby didn t want to waste it). Wants to stop drinking, breaking out lately and also interfering with sleep. Wants to sip on diet Snapple tea and drink more water instead.     Constipation improving per Pharm note - uses milk of magnesia prn. Working on adding more fiber to diet and increasing hydration.     Nov 2022:  Things going really well per pt.     Down about 17 lbs (got up to 205 lbs per pt, now at 188 lbs). Partner also losing weight (on wevogy) and down 40 lbs per pt. Working on lifestyle changes together.     Stopped drinking pop but still breaking out. Was doing a pint of ice cream almost nightly he realized - since stopped. Also was doing 4 bowls in cereal occ when feeling hungry in night. Discussed possible reasons for acne and recommended following  up with dermatologist if concerned further. Feels he might be consuming too much sugar still.     Trying to eat dinner earlier. Doing more meal planning. Cooking on Mondays and again Thursdays then having leftovers - made teriyaki chicken and vegetables, tuna sandwiches. Decreased take out - doing 1x/week on often and making better choices.     Bought more fruit - grapes, bananas    Breakfast - cooking at home OR [employer] sandwich     BM: constipation improving, increased fiber intake    PA: walking around campus and with dogs.  Mobility improving. Also feels he is running out of breath less often.    Previous goals:  1. Aim for dinner no later than 7 pm   2. Aim to grab something on campus after class or making food once home (if not bringing lunch)  3. Look into bringing food to work when at [employer] that doesn t have to be kept in fridge (pack own ice pack or room temperature food). Consider utilizing fridge at [employer]  4. Aim to get [employer] salads on Tuesdays   5. Aim to switch from pop to diet Snapple tea/water. - Met, doing more teas and water.   6. Aim to be in bed by 11 pm - In progress, sleep has been bad. 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    Nutrition Diagnosis  Food and nutrition related knowledge deficit r/t lack of prior exposure to nutrition education aeb pt report and interest in learning    Obesity r/t positive energy balance aeb BMI >30.    Nutrition Intervention  Reviewed and modified previous goals   Answered pt questions  Coordination of care   Nutrition education - Breakfast ideas, complex carbs, fiber, types of fat  AVS and handouts via [employer]    Patient demonstrates understanding.    Expected Engagement: good    Nutrition Goals  1. Aim to  maintain physical activity as able/tolerated in winter months  2. Mindful with portions and comfort foods   3. Call to get parts for CPAP  4. Consider dermatologist if acne not improving     Keep up the hard work!    Vegetarian Protein Ideas  ? Quinoa  ? Nuts  ? Beans  ? Lentils  ? Protein pasta   ? Nutritional yeast  ? Garden of life raw meal powder  ? Liquid aminos  ? Homemade meats - a taco meat could be made with chopped cauliflower/mushroom as an example   ? Hummus (could do homemade if preferred)  ? Hemp hearts     Vegetarian Meals   [email or web address]/category/food-recipes/entrees/      Types of Carbohydrates  [email or web address]     Types of fats  https://www.[facility].edu/nutritionsource/what-should-you-eat/fats-and-cholesterol/types-of-fat/    High Fiber Foods:  Bran cereal, 1/3 cup, 8.6 gm fiber   Cooked kidney beans   cup 7.9 gm  Cooked lentils   cup 7.8 gm  Cooked black beans   cup 7.6 gm  Canned chickpeas   cup 5.3 gm  Baked beans   cup 5.2 gm  Pear 1 5.1 gm  Soybeans   cup 5.1 gm  Quinoa   cup 5 gm  Natan seeds, 1 tbsp 5 gm  Baked sweet potato, with skin 1 medium 4.8 gm  Avocado, half small 4.5 gm  Baked potato, with skin 1 medium 4.4 gm  Cooked frozen green peas   cup 4.4 gm  Bulgur   cup 4.1 gm  Cooked frozen mixed vegetables   cup 4 gm  Raspberries   cup 4 gm  Blackberries   cup 3.8 gm  Almonds 1 oz 3.5 gm  Cooked frozen spinach   cup 3.5 gm  Vegetable or soy darlene 1 each 3.4 gm  Apple 1 medium 3.3 gm  Dried dates 5 pieces 3.3 gm      ADDITIONAL RESOURCES:   The Plate Method  [email or web address]    Protein Sources   [email or web address]://[email or web address]     Mindful Eating  [email or web address]     Summary of Volumetrics Eating Plan  [email or web address]     Follow-Up:  Thursday, December 15th at 11:30 am    Time spent with patient: 33 minutes.  Caty Palomo RD, MPP-D, LD

## 2022-12-15 ENCOUNTER — VIRTUAL VISIT (OUTPATIENT)
Dept: ENDOCRINOLOGY | Facility: CLINIC | Age: 37
End: 2022-12-15
Payer: COMMERCIAL

## 2022-12-15 VITALS — BODY MASS INDEX: 31.41 KG/M2 | WEIGHT: 183 LBS

## 2022-12-15 DIAGNOSIS — Z71.3 NUTRITIONAL COUNSELING: Primary | ICD-10-CM

## 2022-12-15 DIAGNOSIS — E66.9 OBESITY: ICD-10-CM

## 2022-12-15 PROCEDURE — 97803 MED NUTRITION INDIV SUBSEQ: CPT | Mod: 95 | Performed by: DIETITIAN, REGISTERED

## 2022-12-15 NOTE — PROGRESS NOTES
"Kirby Patel is a 37 year old male who is being evaluated via a billable video visit.      The patient has been notified of following:     \"This video visit will be conducted via a call between you and your physician/provider. We have found that certain health care needs can be provided without the need for an in-person physical exam.  This service lets us provide the care you need with a video conversation.  If a prescription is necessary we can send it directly to your pharmacy.  If lab work is needed we can place an order for that and you can then stop by our lab to have the test done at a later time.    Video visits are billed at different rates depending on your insurance coverage.  Please reach out to your insurance provider with any questions.    If during the course of the call the physician/provider feels a video visit is not appropriate, you will not be charged for this service.\"    Patient has given verbal consent for Video visit? Yes  How would you like to obtain your AVS? MyChart  If you are dropped from the video visit, the video invite should be resent to: Text to cell phone: [phone]   Will anyone else be joining your video visit? No  {If patient encounters technical issues they should call [phone]      Video-Visit Details    Type of service:  Video Visit    Video Start Time: 11:40 am  Video End Time: 12:10 pm    Originating Location (pt. Location): Home    Distant Location (provider location):  Offsite     Platform used for Video Visit: Need    During this virtual visit the patient is located in [place], patient verifies this as the location during the entirety of this visit.     Weight Management Nutrition Consultation    Kirby Patel is a 37 year old male presents today for weight management nutrition consultation.  Patient referred by Dr. Velazquez on August 16, 2022.    Patient with Co-morbidities of obesity including:  Type II DM no  Renal Failure no  Sleep apnea yes, dx in 2022, " "using cpap  Hypertension no   Dyslipidemia no  Joint pain - knee pain per pt  Back pain yes  GERD yes    PMH: asthma, depression, anxiety, fatigue    ADHD - recently started on Adderall     Anthropometrics:  Weight 8/16/22: 200 lbs with BMI 34.33    Estimated body mass index is 31.41 kg/m  as calculated from the following:    Height as of 8/16/22: 1.626 m (5' 4\").    Weight as of this encounter: 83 kg (183 lb).     Current weight: 183 lbs, pt report  - Down 5 lbs since last month    Medications for Weight Loss:  Ozempic prescribed 8/16/22   - Did not start, doing well without per pt    Supplements:  Vitamin D   Iron   B12     Labs 6/29/22  Lipids WNL  A1C WNL   ALT elevated     Labs 4/26/22  Alk Phos Elevated  Lipase low     NUTRITION HISTORY  NKFA  Does not consume beef typically but eating lately to help with iron levels     Per MD note - pt reports weight gain of about 30 lbs over the past 3 years. Contributing factors include COVID pandemic, grad school, desk job and sedentary lifestyle.     Pt has met with a RD 1x - discussed portions, etc. Felt helpful but wants more support.     Pt goals: build at least 1 new solid habit, improve fitness level and learn how to eat for activity, decrease snacking and learn better options    Typical foods consumed  B - 2 eggs, 2 zhang, bread, 2 waffles, coffee  L - frozen food such as pizza or chicken strips  D - Take out, fried foods (Yoruba, Chinese, pizza, burger)   Snacks - chips, sweets, popcorn  Fluids: soda, juice, ice tea     Endorses both stress and boredom eating .    PA: walking     Sept 2022:  Being proactive - stocking up on food to prevent running out and getting fast food.    Paying attention to hunger/fulness.     Cutting back on sprite intake.   Switched to a new creamer in coffee.   Drinking more water  If getting [employer], getting chicken sandwich but avoiding pop/fries.  Trying to eat less zhang with breakfast. Cut out waffles.    Went to [employer] - had " bagel, cheese/egg. Usually would get a donut and coffee.    PA: walking more, more work around house (inside/outside), stretching   ? Stamina improving  ? Some pain in back recently, stretching helps    Oct 2022:    Eating 2 meals/day lately, breakfast and dinner. Finding he gets really hungry by 2nd meal. Feels his ADHD might be getting in the way of eating. 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     [facility]    Recent recall  B around 10 am- biscuit sandwich from [employer] with his own coffee from home with agave as sweetener   D around 6 pm - [employer] miso soup cup  9 pm - still hungry, made two Nutella sandwiches     Fluids: started drinking some soda again (his partner accidentally bought regular instead of diet and doesn t like it but Kirby didn t want to waste it). Wants to stop drinking, breaking out lately and also interfering with sleep. Wants to sip on diet Snapple tea and drink more water instead.     Constipation improving per Pharm note - uses milk of magnesia prn. Working on adding more fiber to diet and increasing hydration.     Nov 2022:  Things going really well per pt.     Down about 17 lbs (got up to 205 lbs per pt, now at 188 lbs). Partner also losing weight (on [employer]vogy) and down 40 lbs per pt. Working on lifestyle changes together.     Stopped drinking pop but still breaking out. Was doing a pint of ice cream almost nightly he realized - since stopped. Also was doing 4 bowls in cereal occ when feeling hungry in night. Discussed possible reasons for acne and recommended following up with dermatologist if concerned further. Feels he might be consuming too much sugar still.     Trying to eat dinner earlier. Doing more meal planning. Cooking on Mondays and again Thursdays then having leftovers - made teriyaki chicken and vegetables, tuna sandwiches. Decreased take out - doing 1x/week on often and making  better choices.     Bought more fruit - grapes, bananas    Breakfast - cooking at home OR [employer] sandwich     BM: constipation improving, increased fiber intake    PA: walking around campus and with dogs.  Mobility improving. Also feels he is running out of breath less often.    Dec 2022:  Doing well - noticing weight loss. Feeling really good overall mentally/physically with changes (see below for notes on body image concerns).     Classes ended yesterday - still has lots of grading.    Got new neighbors - hanging out often. Neighbor wants to be more active so they plan to workout together.    Doing less take out - hasn t had any for a few weeks.  Starting with smaller portions. Then adding food if still hungry (nuts, fruit, popcorn, etc)    Decreased ice cream but finding self having fruit popsicles at 4 am sometimes (getting up to use bathroom and finding self hungry/having cravings). Then not feeling good and affecting sleep. Has been going to bed earlier and feels good about that.    Caffeine/sugar before bed likely playing a role.   Also needs new parts for CPAP - finally got but out of distilled water       PA: loves dancing, active around house (tracking activity on [employer] Watch)  ? SOB decreasing with activity   ? Wants to add in more next month     Noticing body dysmorphia and struggling with acne. Discussed possible contributing factors. Is going to start working with a derm and is already working with a therapist.     Previous goals:  1. Aim to maintain physical activity as able/tolerated in winter months - Going well   2. Mindful with portions and comfort foods   3. Call to get parts for CPAP - Met but ran out of distilled water. Did get more today.  4. 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    Nutrition Diagnosis  Food and nutrition related knowledge deficit r/t lack of prior exposure to nutrition education aeb pt report and interest in learning    Obesity r/t positive energy balance aeb BMI >30.    Nutrition Intervention  Reviewed and modified previous goals   Answered pt questions  Coordination of care   Nutrition education - Breakfast ideas, complex carbs, fiber, types of fat  AVS and handouts via [employer]    Patient demonstrates understanding.    Expected Engagement: good    Nutrition Goals  1. Start working out with friend  2. Recommend discussing body dysmorphia and body image concerns more with therapist to ensure you are getting good support  3. Start using CPAP to help with sleep/night time eating  4. Pay attention to eating habits (look for any trends with eating/waking up hungry)     Keep up the hard work!    Vegetarian Protein Ideas  ? Quinoa  ? Nuts  ? Beans  ? Lentils  ? Protein pasta   ? Nutritional yeast  ? Garden of life raw meal powder  ? Liquid aminos  ? Homemade meats - a taco meat could be made with chopped cauliflower/mushroom as an example   ? Hummus (could do homemade if preferred)  ? Hemp hearts     Vegetarian Meals   https://[employer]/category/food-recipes/entrees/      Types of Carbohydrates  [email or web address]     Types of fats  https://www.[facility].edu/nutritionsource/what-should-you-eat/fats-and-cholesterol/types-of-fat/    High Fiber Foods:  Bran cereal, 1/3 cup, 8.6 gm fiber   Cooked kidney beans   cup 7.9 gm  Cooked lentils   cup 7.8 gm  Cooked black beans   cup 7.6 gm  Canned chickpeas   cup 5.3 gm  Baked beans   cup 5.2 gm  Pear 1 5.1 gm  Soybeans   cup 5.1 gm  Quinoa   cup 5 gm  Natan seeds, 1 tbsp 5 gm  Baked sweet potato, with skin 1 medium 4.8 gm  Avocado, half small 4.5 gm  Baked potato, with skin 1 medium 4.4 gm  Cooked frozen green peas    cup 4.4 gm  Bulgur   cup 4.1 gm  Cooked frozen mixed vegetables   cup 4 gm  Raspberries   cup 4 gm  Blackberries   cup 3.8 gm  Almonds 1 oz 3.5 gm  Cooked frozen spinach   cup 3.5 gm  Vegetable or soy darlene 1 each 3.4 gm  Apple 1 medium 3.3 gm  Dried dates 5 pieces 3.3 gm      ADDITIONAL RESOURCES:   The Plate Method  [email or web address]    Protein Sources   [email or web address]     Mindful Eating  [email or web address]     Summary of Volumetrics Eating Plan  [email or web address]     Follow-Up:  Tuesday, January 24th at 1:30 pm    Time spent with patient: 30 minutes.  Caty Palomo RD, MPP-D, LD

## 2022-12-15 NOTE — LETTER
"12/15/2022       RE: Kirby Patel  [address]  [facility] [address]     Dear Colleague,    Thank you for referring your patient, Kirby Patel, to the [facility] at [facility]. Please see a copy of my visit note below.    Kirby Patel is a 37 year old male who is being evaluated via a billable video visit.      The patient has been notified of following:     \"This video visit will be conducted via a call between you and your physician/provider. We have found that certain health care needs can be provided without the need for an in-person physical exam.  This service lets us provide the care you need with a video conversation.  If a prescription is necessary we can send it directly to your pharmacy.  If lab work is needed we can place an order for that and you can then stop by our lab to have the test done at a later time.    Video visits are billed at different rates depending on your insurance coverage.  Please reach out to your insurance provider with any questions.    If during the course of the call the physician/provider feels a video visit is not appropriate, you will not be charged for this service.\"    Patient has given verbal consent for Video visit? Yes  How would you like to obtain your AVS? MyChart  If you are dropped from the video visit, the video invite should be resent to: Text to cell phone: [phone]   Will anyone else be joining your video visit? No  {If patient encounters technical issues they should call [phone]      Video-Visit Details    Type of service:  Video Visit    Video Start Time: 11:40 am  Video End Time: 12:10 pm    Originating Location (pt. Location): Home    Distant Location (provider location):  Offsite     Platform used for Video Visit: Bucmi    During this virtual visit the patient is located in [place], patient verifies this as the location during the entirety " "of this visit.     Weight Management Nutrition Consultation    Kirby Patel is a 37 year old male presents today for weight management nutrition consultation.  Patient referred by Dr. Velazquez on August 16, 2022.    Patient with Co-morbidities of obesity including:  Type II DM no  Renal Failure no  Sleep apnea yes, dx in 2022, using cpap  Hypertension no   Dyslipidemia no  Joint pain - knee pain per pt  Back pain yes  GERD yes    PMH: asthma, depression, anxiety, fatigue    ADHD - recently started on Adderall     Anthropometrics:  Weight 8/16/22: 200 lbs with BMI 34.33    Estimated body mass index is 31.41 kg/m  as calculated from the following:    Height as of 8/16/22: 1.626 m (5' 4\").    Weight as of this encounter: 83 kg (183 lb).     Current weight: 183 lbs, pt report  - Down 5 lbs since last month    Medications for Weight Loss:  Ozempic prescribed 8/16/22   - Did not start, doing well without per pt    Supplements:  Vitamin D   Iron   B12     Labs 6/29/22  Lipids WNL  A1C WNL   ALT elevated     Labs 4/26/22  Alk Phos Elevated  Lipase low     NUTRITION HISTORY  NKFA  Does not consume beef typically but eating lately to help with iron levels     Per MD note - pt reports weight gain of about 30 lbs over the past 3 years. Contributing factors include COVID pandemic, grad school, desk job and sedentary lifestyle.     Pt has met with a RD 1x - discussed portions, etc. Felt helpful but wants more support.     Pt goals: build at least 1 new solid habit, improve fitness level and learn how to eat for activity, decrease snacking and learn better options    Typical foods consumed  B - 2 eggs, 2 zhang, bread, 2 waffles, coffee  L - frozen food such as pizza or chicken strips  D - Take out, fried foods (Albanian, Chinese, pizza, burger)   Snacks - chips, sweets, popcorn  Fluids: soda, juice, ice tea     Endorses both stress and boredom eating .    PA: walking     Sept 2022:  Being proactive - stocking up on food to prevent " running out and getting fast food.    Paying attention to hunger/fulness.     Cutting back on sprite intake.   Switched to a new creamer in coffee.   Drinking more water  If getting [employer], getting chicken sandwich but avoiding pop/fries.  Trying to eat less zhang with breakfast. Cut out waffles.    Went to [employer] - had bagel, cheese/egg. Usually would get a donut and coffee.    PA: walking more, more work around house (inside/outside), stretching   ? Stamina improving  ? Some pain in back recently, stretching helps    Oct 2022:    Eating 2 meals/day lately, breakfast and dinner. Finding he gets really hungry by 2nd meal. Feels his ADHD might be getting in the way of eating. 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     Occ going to [facility] to get a salad for lunch.    Recent recall  B around 10 am- biscuit sandwich from [employer] with his own coffee from home with agave as sweetener   D around 6 pm - [employer] miso soup cup  9 pm - still hungry, made two Nutella sandwiches     Fluids: started drinking some soda again (his partner accidentally bought regular instead of diet and doesn t like it but Kirby didn t want to waste it). Wants to stop drinking, breaking out lately and also interfering with sleep. Wants to sip on diet Snapple tea and drink more water instead.     Constipation improving per Pharm note - uses milk of magnesia prn. Working on adding more fiber to diet and increasing hydration.     Nov 2022:  Things going really well per pt.     Down about 17 lbs (got up to 205 lbs per pt, now at 188 lbs). Partner also losing weight (on wevogy) and down 40 lbs per pt. Working on lifestyle changes together.     Stopped drinking pop but still breaking out. Was doing a pint of ice cream almost nightly he realized - since stopped. Also was doing 4 bowls in cereal occ when feeling hungry in night. Discussed possible reasons for acne and recommended following  up with dermatologist if concerned further. Feels he might be consuming too much sugar still.     Trying to eat dinner earlier. Doing more meal planning. Cooking on Mondays and again Thursdays then having leftovers - made teriyaki chicken and vegetables, tuna sandwiches. Decreased take out - doing 1x/week on often and making better choices.     Bought more fruit - grapes, bananas    Breakfast - cooking at home OR [employer] sandwich     BM: constipation improving, increased fiber intake    PA: walking around campus and with dogs.  Mobility improving. Also feels he is running out of breath less often.    Dec 2022:  Doing well - noticing weight loss. Feeling really good overall mentally/physically with changes (see below for notes on body image concerns).     Classes ended yesterday - still has lots of grading.    Got new neighbors - hanging out often. Neighbor wants to be more active so they plan to workout together.    Doing less take out - hasn t had any for a few weeks.  Starting with smaller portions. Then adding food if still hungry (nuts, fruit, popcorn, etc)    Decreased ice cream but finding self having fruit popsicles at 4 am sometimes (getting up to use bathroom and finding self hungry/having cravings). Then not feeling good and affecting sleep. Has been going to bed earlier and feels good about that.    Caffeine/sugar before bed likely playing a role.   Also needs new parts for CPAP - finally got but out of distilled water       PA: loves dancing, active around house (tracking activity on Apple Watch)  ? SOB decreasing with activity   ? Wants to add in more next month     Noticing body dysmorphia and struggling with acne. Discussed possible contributing factors. Is going to start working with a derm and is already working with a therapist.     Previous goals:  1. Aim to maintain physical activity as able/tolerated in winter months - Going well   2. Mindful with portions and comfort foods   3. Call to get  parts for CPAP - Met but ran out of distilled water. Did get more today.  4. 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    Nutrition Diagnosis  Food and nutrition related knowledge deficit r/t lack of prior exposure to nutrition education aeb pt report and interest in learning    Obesity r/t positive energy balance aeb BMI >30.    Nutrition Intervention  Reviewed and modified previous goals   Answered pt questions  Coordination of care   Nutrition education - Breakfast ideas, complex carbs, fiber, types of fat  AVS and handouts via [employer]    Patient demonstrates understanding.    Expected Engagement: good    Nutrition Goals  1. Start working out with friend  2. Recommend discussing body dysmorphia and body image concerns more with therapist to ensure you are getting good support  3. Start using CPAP to help with sleep/night time eating  4. Pay attention to eating habits (look for any trends with eating/waking up hungry)     Keep up the hard work!    Vegetarian Protein Ideas  ? Quinoa  ? Nuts  ? Beans  ? Lentils  ? Protein pasta   ? Nutritional yeast  ? Garden of life raw meal powder  ? Liquid aminos  ? Homemade meats - a taco meat could be made with chopped cauliflower/mushroom as an example   ? Hummus (could do homemade if preferred)  ? Hemp hearts     Vegetarian Meals   [email or web address]      Types of Carbohydrates  [email or web address]     Types of fats  https://www.[facility].[employer].edu/nutritionsource/what-should-you-eat/fats-and-cholesterol/types-of-fat/    High Fiber Foods:  Bran cereal, 1/3 cup, 8.6 gm fiber   Cooked kidney beans   cup 7.9 gm  Cooked lentils    cup 7.8 gm  Cooked black beans   cup 7.6 gm  Canned chickpeas   cup 5.3 gm  Baked beans   cup 5.2 gm  Pear 1 5.1 gm  Soybeans   cup 5.1 gm  Quinoa   cup 5 gm  Natan seeds, 1 tbsp 5 gm  Baked sweet potato, with skin 1 medium 4.8 gm  Avocado, half small 4.5 gm  Baked potato, with skin 1 medium 4.4 gm  Cooked frozen green peas   cup 4.4 gm  Bulgur   cup 4.1 gm  Cooked frozen mixed vegetables   cup 4 gm  Raspberries   cup 4 gm  Blackberries   cup 3.8 gm  Almonds 1 oz 3.5 gm  Cooked frozen spinach   cup 3.5 gm  Vegetable or soy darlene 1 each 3.4 gm  Apple 1 medium 3.3 gm  Dried dates 5 pieces 3.3 gm      ADDITIONAL RESOURCES:   The Plate Method  [email or web address]    Protein Sources   [email or web address]     Mindful Eating  [email or web address]     Summary of Volumetrics Eating Plan  [email or web address]     Follow-Up:  Tuesday, January 24th at 1:30 pm    Time spent with patient: 30 minutes.  Caty Palomo RD, STEVEND, LD

## 2022-12-16 NOTE — PATIENT INSTRUCTIONS
Nutrition Goals  Start working out with friend  Recommend discussing body dysmorphia and body image concerns more with therapist to ensure you are getting good support  Start using CPAP to help with sleep/night time eating  Pay attention to eating habits (look for any trends with eating/waking up hungry)     Keep up the hard work!    Vegetarian Protein Ideas  Quinoa  Nuts  Beans  Lentils  Protein pasta   Nutritional yeast  Garden of life raw meal powder  Liquid aminos  Homemade meats - a taco meat could be made with chopped cauliflower/mushroom as an example   Hummus (could do homemade if preferred)  Hemp hearts     Vegetarian Meals   [email or web address]-recipes/[email or web address]      Types of Carbohydrates  [email or web address]     Types of fats  https://www.[facility].[employer].edu/nutritionsource/what-should-you-eat/fats-and-cholesterol/types-of-fat/    High Fiber Foods:  Bran cereal, 1/3 cup, 8.6 gm fiber   Cooked kidney beans   cup 7.9 gm  Cooked lentils   cup 7.8 gm  Cooked black beans   cup 7.6 gm  Canned chickpeas   cup 5.3 gm  Baked beans   cup 5.2 gm  Pear 1 5.1 gm  Soybeans   cup 5.1 gm  Quinoa   cup 5 gm  Natan seeds, 1 tbsp 5 gm  Baked sweet potato, with skin 1 medium 4.8 gm  Avocado, half small 4.5 gm  Baked potato, with skin 1 medium 4.4 gm  Cooked frozen green peas   cup 4.4 gm  Bulgur   cup 4.1 gm  Cooked frozen mixed vegetables   cup 4 gm  Raspberries   cup 4 gm  Blackberries   cup 3.8 gm  Almonds 1 oz 3.5 gm  Cooked frozen spinach   cup 3.5 gm  Vegetable or soy darlene 1 each 3.4 gm  Apple 1 medium 3.3 gm  Dried dates 5 pieces 3.3 gm      ADDITIONAL RESOURCES:   The Plate Method  [email or web address]    Protein Sources   [email or web address]     Mindful Eating  [email or web address]     Summary of Volumetrics Eating Plan  [email or web address]     Follow-Up:  Tuesday, January 24th at 1:30 pm    RUPERTO Rider, RD, LD  Clinic #: [phone]

## 2022-12-29 DIAGNOSIS — G43.709 CHRONIC MIGRAINE WITHOUT AURA WITHOUT STATUS MIGRAINOSUS, NOT INTRACTABLE: ICD-10-CM

## 2022-12-29 RX ORDER — VERAPAMIL HYDROCHLORIDE 40 MG/1
TABLET ORAL
Qty: 60 TABLET | Refills: 9 | Status: SHIPPED | OUTPATIENT
Start: 2022-12-29 | End: 2023-10-10

## 2022-12-29 NOTE — TELEPHONE ENCOUNTER
Rx Authorization:  Requested Medication/ Dose VERAPAMIL 40 MG TABLET  Date last refill ordered: 7/7/22  Quantity ordered:   # refills: 3  Date of last clinic visit with ordering provider:   Date of next clinic visit with ordering provider:   All pertinent protocol data (lab date/result):   Include pertinent information from patients message:

## 2023-02-06 DIAGNOSIS — K21.9 GASTROESOPHAGEAL REFLUX DISEASE WITHOUT ESOPHAGITIS: ICD-10-CM

## 2023-02-08 RX ORDER — OMEPRAZOLE 40 MG/1
40 CAPSULE, DELAYED RELEASE ORAL DAILY
Qty: 90 CAPSULE | Refills: 0 | Status: SHIPPED | OUTPATIENT
Start: 2023-02-08 | End: 2023-04-12

## 2023-02-08 NOTE — TELEPHONE ENCOUNTER
LCV:1/18/2022  [facility] [place] ( RTC 3 M)  RF 90 day  FYI to scheduling

## 2023-02-10 NOTE — TELEPHONE ENCOUNTER
ANA for scheduling, past Toni pt. Can schedule return gi visit with any gen GI provider, next available. Sent [email or web address].

## 2023-03-27 ENCOUNTER — MYC MEDICAL ADVICE (OUTPATIENT)
Dept: GASTROENTEROLOGY | Facility: CLINIC | Age: 38
End: 2023-03-27
Payer: COMMERCIAL

## 2023-04-03 NOTE — TELEPHONE ENCOUNTER
Called to remind patient of their upcoming appointment with our GI clinic, on Wednesday, April 12th at 12:45pm with Bindu Matias. This appointment is scheduled as a video visit. You will receive a call approximately 30 minutes prior to check you in, you must be in [place] for this visit., if your appointment is virtual (video or telephone) you need to be in [place] for the visit. To reschedule or cancel patient to call [phone].    Kimberly Antoine

## 2023-04-12 ENCOUNTER — VIRTUAL VISIT (OUTPATIENT)
Dept: GASTROENTEROLOGY | Facility: CLINIC | Age: 38
End: 2023-04-12
Payer: COMMERCIAL

## 2023-04-12 DIAGNOSIS — K62.5 BRBPR (BRIGHT RED BLOOD PER RECTUM): Primary | ICD-10-CM

## 2023-04-12 DIAGNOSIS — K21.9 GASTROESOPHAGEAL REFLUX DISEASE WITHOUT ESOPHAGITIS: ICD-10-CM

## 2023-04-12 PROCEDURE — 99215 OFFICE O/P EST HI 40 MIN: CPT | Mod: VID | Performed by: PHYSICIAN ASSISTANT

## 2023-04-12 RX ORDER — FAMOTIDINE 20 MG/1
20 TABLET, FILM COATED ORAL 2 TIMES DAILY
Qty: 180 TABLET | Refills: 3 | Status: SHIPPED | OUTPATIENT
Start: 2023-04-12

## 2023-04-12 RX ORDER — OMEPRAZOLE 40 MG/1
40 CAPSULE, DELAYED RELEASE ORAL DAILY
Qty: 90 CAPSULE | Refills: 3 | Status: SHIPPED | OUTPATIENT
Start: 2023-04-12 | End: 2024-07-31

## 2023-04-12 NOTE — PATIENT INSTRUCTIONS
It was a pleasure taking care of you today.  I've included a brief summary of our discussion and care plan from today's visit below.  Please review this information with your primary care provider.  _______________________________________________________________________    My recommendations are summarized as follows:    1) You can use over-the-counter magnesium supplements, which work as a gentle, non-habit forming laxative. Look for magnesium citrate, magnesium oxide, or magnesium glycinate. These are often available in tablets, capsules, powders, or gummies. A good starting dose for most people is 300-400 mg daily. If you experience diarrhea with this dosing, you can decrease to 100-200 mg daily. It may be increased up to 1000 mg daily.    2) Trial of benefiber; start with 1-2 teaspoons, and gradually increase to 1 Tablespoon daily. This can be increased up to 3 tablespoons daily, if needed. You could consider first starting the magnesium alone for a week or two, and then introduce the fiber, to see if this causes less gas and risk of constipation.    3) Referral placed to the Colorectal surgery      Return to GI Clinic in 3 months to review your progress.    ______________________________________________________________________    How do I schedule labs, imaging studies, or procedures that were ordered in clinic today?     Labs: To schedule lab appointment at the [facility], use my chart or call [phone]. If you have a [place] lab closer to home where you are regularly seen you can give them a call.     Procedures: If a colonoscopy, upper endoscopy, breath test, esophageal manometry, or pH impedence was ordered today, our endoscopy team will call you to schedule this. If you have not heard from our endoscopy team within a week, please call [phone] option 2 to schedule.     Imaging Studies: If you were scheduled for a CT scan, X-ray, MRI, ultrasound, HIDA scan or other imaging study,  please call [phone] to have this scheduled.     Referral: If a referral to another specialty was ordered, expect a phone call or follow instructions above. If you have not heard from anyone regarding your referral in a week, please call our clinic to check the status.     Who do I call with any questions after my visit?  Please be in touch if there are any further questions that arise following today's visit.  There are multiple ways to contact your gastroenterology care team.      During business hours, you may reach a Gastroenterology nurse at [phone]    To schedule or reschedule an appointment, please call [phone].     You can always send a secure message through [employer].  [employer] messages are answered by your nurse or doctor typically within 24 hours.  Please allow extra time on weekends and holidays.      For urgent/emergent questions after business hours, you may reach the on-call GI Fellow by contacting the [facility]  at [phone].     How will I get the results of any tests ordered?    You will receive all of your results.  If you have signed up for [employer], any tests ordered at your visit will be available to you after your physician reviews them.  Typically this takes 1-2 weeks.  If there are urgent results that require a change in your care plan, your physician or nurse will call you to discuss the next steps.      What is [employer]?  [employer] is a secure way for you to access all of your healthcare records from the [facility].  It is a web based computer program, so you can sign on to it from any location.  It also allows you to send secure messages to your care team.  I recommend signing up for [employer] access if you have not already done so and are comfortable with using a computer.      How to I schedule a follow-up visit?  If you did not schedule a follow-up visit today, please call [phone] to schedule a follow-up office visit.       Sincerely,    Bindu Matias PA-C  Division of Gastroenterology, Hepatology & Nutrition  [facility]

## 2023-04-12 NOTE — PROGRESS NOTES
GI CLINIC VISIT    CC/REFERRING PROVIDER: Referred Self    HPI: 37 year old male with PMH of anxiety, depression, DIEGO, migraine headaches, presenting to GI clinic for follow-up.    Kirby has previously established in our clinic with Dr. Ofelia Milligan and Ivana Muñoz PA-C. Briefly, he was initially evaluated by a gastroenterologist in [place], where he was diagnosed with irritable bowel syndrome and GERD. He was later seen at [facility] between 2015 and 2016, at which time he had an EGD and colonoscopy for blood in stools. Per patient, this was unremarkable. At time of consultations at our clinic, he reported a variable stool pattern with loose stools alternating with constipation. He underwent EGD 1/23/2020 with normal esophagus, stomach with erythema and a few erosions in the gastric antrum, with biopsies suggestive of reactive gastropathy, without evidence of H pylori. Duodenal biopsies consistent with peptic duodenitis. Patient underwent colonoscopy 1/18/2021 without source of symptoms. He had previously been given a course of Rifaximin for symptoms, with initial course offering symptom improvement.    At most recent follow-up January 2022, he noted bloating and constipation, followed by diarrhea. He was recommended to start magnesium 400 mg daily. In regard to GERD, he was recommended to continue omeprazole.    Today, Kirby notes he has lost weight; he notes that around the Fall of  Last year, he was formaly diagnosed and placed on ADHD medications and started to lose weight following this. His partner started a weight loss journey around that time, and this has changed Kirby's eating habits as well, noting decreased intake of take-out (previously daily), and decreased volume of intake. He estimates with these changes, he has lost 25 pounds intentionally. He notes that this has helped the heartburn and reflux significantly. He uses famotidine daily to as needed, which helps. Remains on  Prilosec.    However, he continues to have periods of time where he notes he has BRBPR with bowel movements. He is trying to strain less and spend less time on the toilet. He notes there was a time where he was taking fiber, and instead of helping with the stools, he notes he had increased bloating and constipated. Currently, he notes he has ongoing variable bowel habits. He notes the bloating has been improved by cutting out soda. Coffee can augment bloating as well. He uses Gas-X if needed. He is having bowel movements ranging from once daily, which then is followed by what he describes as a release, with 5-6 clustered bowel movements that day. He estimates that this has happened 3-4 times in the last few months. Stools can often be hard and dry, associated with straining and a sense of incomplete evacuation. He is not currently using magnesium.     ROS: 10pt ROS performed and otherwise negative.    PAST MEDICAL HISTORY:  Past Medical History:   Diagnosis Date     Anxiety      Depressive disorder      Intractable vomiting with nausea, unspecified vomiting type 11/13/2016     Kidney stone on right side 11/13/2016     DIEGO (obstructive sleep apnea)- severe (AHI 35) 3/24/2022       PREVIOUS ABDOMINAL/GYNECOLOGIC SURGERIES:  Past Surgical History:   Procedure Laterality Date     COLONOSCOPY       COLONOSCOPY N/A 1/18/2021    Procedure: COLONOSCOPY, WITH  BIOPSY;  Surgeon: Spencer Barraza MD;  [place] [facility] [place]         PERTINENT MEDICATIONS:  Current Outpatient Medications   Medication Sig Dispense Refill     ADVAIR DISKUS 250-50 MCG/ACT inhaler Inhale 1 puff into the lungs 2 times daily       albuterol (PROAIR HFA/PROVENTIL HFA/VENTOLIN HFA) 108 (90 Base) MCG/ACT inhaler Inhale 1 puff into the lungs as needed       amphetamine-dextroamphetamine (ADDERALL XR) 10 MG 24 hr capsule Take 10 mg by mouth every morning       amphetamine-dextroamphetamine (ADDERALL) 5 MG tablet Take 5-10 mg by mouth daily as needed        escitalopram (LEXAPRO) 20 MG tablet Take 1 tablet by mouth daily       famotidine (PEPCID) 20 MG tablet Take 1 tablet (20 mg) by mouth 2 times daily 180 tablet 1     LORazepam (ATIVAN) 0.5 MG tablet Take 0.5 mg by mouth every 6 hours as needed       magnesium hydroxide (MOM) 2400 MG/10ML SUSP Take 5 mLs by mouth as needed for constipation       omeprazole (PRILOSEC) 40 MG DR capsule Take 1 capsule (40 mg) by mouth daily For additional refills, please schedule a follow-up appointment 90 capsule 0     ondansetron (ZOFRAN ODT) 4 MG ODT tab Take 1 tablet (4 mg) by mouth every 8 hours as needed for nausea 20 tablet 3     semaglutide (OZEMPIC, 0.25 OR 0.5 MG/DOSE,) 2 MG/1.5ML SOPN pen -Start Ozempic 0.25 mg weekly for 2 weeks then increase to 0.5 mg weekly for 1 month and then increase to 1.0 mg weekly thereafter (Patient not taking: Reported on 9/26/2022) 4.5 mL 0     Semaglutide, 1 MG/DOSE, 4 MG/3ML SOPN Inject 1 mg Subcutaneous once a week To be used after finishing 0.5 mg weekly (Patient not taking: Reported on 9/26/2022) 9 mL 3     verapamil (CALAN) 40 MG tablet Take  one tab twice daily 60 tablet 9     ZOLMitriptan (ZOMIG) 2.5 MG tablet Take 1 tablet (2.5 mg) by mouth at onset of headache for migraine May repeat in 2 hours. Max 4 tablets/24 hours. 18 tablet 9       SOCIAL HISTORY:  Social History     Socioeconomic History     Marital status: Single     Spouse name: Not on file     Number of children: Not on file     Years of education: Not on file     Highest education level: Not on file   Occupational History     Not on file   Tobacco Use     Smoking status: Never     Smokeless tobacco: Never   Vaping Use     Vaping status: Not on file   Substance and Sexual Activity     Alcohol use: Not Currently     Drug use: No     Sexual activity: Yes     Partners: Male   Other Topics Concern     Not on file   Social History Narrative     Not on file     Social Determinants of Health     Financial Resource Strain: Not on  file   Food Insecurity: Not on file   Transportation Needs: Not on file   Physical Activity: Not on file   Stress: Not on file   Social Connections: Not on file   Intimate Partner Violence: Not on file   Housing Stability: Not on file       FAMILY HISTORY:  Family History   Problem Relation Age of Onset     Diabetes Mother      Obesity Mother      Hyperlipidemia Mother      Uterine Cancer Maternal Grandmother        PHYSICAL EXAMINATION:  Vitals reviewed  There were no vitals taken for this visit.  Video physical exam  General: Patient appears well in no acute distress.   Skin: No visualized rash or lesions on visualized skin  Eyes: EOMI, no erythema, sclera icterus or discharge noted  Resp: Appears to be breathing comfortably without accessory muscle usage, speaking in full sentences, no cough  MSK: Appears to have normal range of motion based on visualized movements  Neurologic: No apparent tremors, facial movements symmetric  Psych: affect normal, alert and oriented    The rest of a comprehensive physical examination is deferred due to PHE (public health emergency) video restrictions      PERTINENT STUDIES Reviewed in EMR    ASSESSMENT/PLAN:    # Bloating  # Irregular bowel habits  Long history of irregular bowel habits, with multiple negative endoscopic evaluations. Bloating has improved with reducing soda intake. He continues to have irregular bowel habits marked by the presence of hard, dry stools associated with straining, and intermittent sense of incomplete evacuation. We reviewed the following:  -- Start osmotic laxative in the form of magnesium  -- Add in fiber supplementation    # BRBPR  Colonoscopy in 2021 without concerning findings, suspected to have outlet bleeding.  He continues to have episodic BRBPR associated with pain. We discussed a referral to CRS, of which he was interested in. In the meantime, continue the above recommendations.    # Heartburn and reflux  EGD with findings consistent with  reactive gastropathy and peptic duodenitis, without erosive GERD. Symptoms have significantly improved with intentional weight loss. We discussed considering trial of stopping PPI given the weight loss, versus continuing. Refilled both omeprazole and Pepcid.    RTC 3 months    Thank you for this consultation. It was a pleasure to participate in the care of this patient; please contact us with any further questions.    Bindu Matias PA-C    57 minutes spent on the date of the encounter doing chart review, review of test results, patient visit and documentation

## 2023-04-12 NOTE — LETTER
4/12/2023         RE: Kirby Patel  [address]  [facility] [address]        Dear Colleague,    Thank you for referring your patient, Kirby Patel, to the [facility] [place]. Please see a copy of my visit note below.    GI CLINIC VISIT    CC/REFERRING PROVIDER: Referred Self    HPI: 37 year old male with PMH of anxiety, depression, DIEGO, migraine headaches, presenting to GI clinic for follow-up.    Kirby has previously established in our clinic with Dr. Ofelia Milligan and Ivana Muñoz PA-C. Briefly, he was initially evaluated by a gastroenterologist in [place], where he was diagnosed with irritable bowel syndrome and GERD. He was later seen at [facility] between 2015 and 2016, at which time he had an EGD and colonoscopy for blood in stools. Per patient, this was unremarkable. At time of consultations at our clinic, he reported a variable stool pattern with loose stools alternating with constipation. He underwent EGD 1/23/2020 with normal esophagus, stomach with erythema and a few erosions in the gastric antrum, with biopsies suggestive of reactive gastropathy, without evidence of H pylori. Duodenal biopsies consistent with peptic duodenitis. Patient underwent colonoscopy 1/18/2021 without source of symptoms. He had previously been given a course of Rifaximin for symptoms, with initial course offering symptom improvement.    At most recent follow-up January 2022, he noted bloating and constipation, followed by diarrhea. He was recommended to start magnesium 400 mg daily. In regard to GERD, he was recommended to continue omeprazole.    Today, Kirby notes he has lost weight; he notes that around the Fall of  Last year, he was formaly diagnosed and placed on ADHD medications and started to lose weight following this. His partner started a weight loss journey around that time, and this has changed Kirby's eating habits as well, noting decreased  intake of take-out (previously daily), and decreased volume of intake. He estimates with these changes, he has lost 25 pounds intentionally. He notes that this has helped the heartburn and reflux significantly. He uses famotidine daily to as needed, which helps. Remains on Prilosec.    However, he continues to have periods of time where he notes he has BRBPR with bowel movements. He is trying to strain less and spend less time on the toilet. He notes there was a time where he was taking fiber, and instead of helping with the stools, he notes he had increased bloating and constipated. Currently, he notes he has ongoing variable bowel habits. He notes the bloating has been improved by cutting out soda. Coffee can augment bloating as well. He uses Gas-X if needed. He is having bowel movements ranging from once daily, which then is followed by what he describes as a release, with 5-6 clustered bowel movements that day. He estimates that this has happened 3-4 times in the last few months. Stools can often be hard and dry, associated with straining and a sense of incomplete evacuation. He is not currently using magnesium.     ROS: 10pt ROS performed and otherwise negative.    PAST MEDICAL HISTORY:  Past Medical History:   Diagnosis Date    Anxiety     Depressive disorder     Intractable vomiting with nausea, unspecified vomiting type 11/13/2016    Kidney stone on right side 11/13/2016    DIEGO (obstructive sleep apnea)- severe (AHI 35) 3/24/2022       PREVIOUS ABDOMINAL/GYNECOLOGIC SURGERIES:  Past Surgical History:   Procedure Laterality Date    COLONOSCOPY      COLONOSCOPY N/A 1/18/2021    Procedure: COLONOSCOPY, WITH  BIOPSY;  Surgeon: Spencer Barraza MD;  [place] [facility] [place]         PERTINENT MEDICATIONS:  Current Outpatient Medications   Medication Sig Dispense Refill    ADVAIR DISKUS 250-50 MCG/ACT inhaler Inhale 1 puff into the lungs 2 times daily      albuterol (PROAIR HFA/PROVENTIL HFA/VENTOLIN HFA) 108 (90  Base) MCG/ACT inhaler Inhale 1 puff into the lungs as needed      amphetamine-dextroamphetamine (ADDERALL XR) 10 MG 24 hr capsule Take 10 mg by mouth every morning      amphetamine-dextroamphetamine (ADDERALL) 5 MG tablet Take 5-10 mg by mouth daily as needed      escitalopram (LEXAPRO) 20 MG tablet Take 1 tablet by mouth daily      famotidine (PEPCID) 20 MG tablet Take 1 tablet (20 mg) by mouth 2 times daily 180 tablet 1    LORazepam (ATIVAN) 0.5 MG tablet Take 0.5 mg by mouth every 6 hours as needed      magnesium hydroxide (MOM) 2400 MG/10ML SUSP Take 5 mLs by mouth as needed for constipation      omeprazole (PRILOSEC) 40 MG DR capsule Take 1 capsule (40 mg) by mouth daily For additional refills, please schedule a follow-up appointment 90 capsule 0    ondansetron (ZOFRAN ODT) 4 MG ODT tab Take 1 tablet (4 mg) by mouth every 8 hours as needed for nausea 20 tablet 3    semaglutide (OZEMPIC, 0.25 OR 0.5 MG/DOSE,) 2 MG/1.5ML SOPN pen -Start Ozempic 0.25 mg weekly for 2 weeks then increase to 0.5 mg weekly for 1 month and then increase to 1.0 mg weekly thereafter (Patient not taking: Reported on 9/26/2022) 4.5 mL 0    Semaglutide, 1 MG/DOSE, 4 MG/3ML SOPN Inject 1 mg Subcutaneous once a week To be used after finishing 0.5 mg weekly (Patient not taking: Reported on 9/26/2022) 9 mL 3    verapamil (CALAN) 40 MG tablet Take  one tab twice daily 60 tablet 9    ZOLMitriptan (ZOMIG) 2.5 MG tablet Take 1 tablet (2.5 mg) by mouth at onset of headache for migraine May repeat in 2 hours. Max 4 tablets/24 hours. 18 tablet 9       SOCIAL HISTORY:  Social History     Socioeconomic History    Marital status: Single     Spouse name: Not on file    Number of children: Not on file    Years of education: Not on file    Highest education level: Not on file   Occupational History    Not on file   Tobacco Use    Smoking status: Never    Smokeless tobacco: Never   Vaping Use    Vaping status: Not on file   Substance and Sexual Activity     Alcohol use: Not Currently    Drug use: No    Sexual activity: Yes     Partners: Male   Other Topics Concern    Not on file   Social History Narrative    Not on file     Social Determinants of Health     Financial Resource Strain: Not on file   Food Insecurity: Not on file   Transportation Needs: Not on file   Physical Activity: Not on file   Stress: Not on file   Social Connections: Not on file   Intimate Partner Violence: Not on file   Housing Stability: Not on file       FAMILY HISTORY:  Family History   Problem Relation Age of Onset    Diabetes Mother     Obesity Mother     Hyperlipidemia Mother     Uterine Cancer Maternal Grandmother        PHYSICAL EXAMINATION:  Vitals reviewed  There were no vitals taken for this visit.  Video physical exam  General: Patient appears well in no acute distress.   Skin: No visualized rash or lesions on visualized skin  Eyes: EOMI, no erythema, sclera icterus or discharge noted  Resp: Appears to be breathing comfortably without accessory muscle usage, speaking in full sentences, no cough  MSK: Appears to have normal range of motion based on visualized movements  Neurologic: No apparent tremors, facial movements symmetric  Psych: affect normal, alert and oriented    The rest of a comprehensive physical examination is deferred due to PHE (public health emergency) video restrictions      PERTINENT STUDIES Reviewed in EMR    ASSESSMENT/PLAN:    # Bloating  # Irregular bowel habits  Long history of irregular bowel habits, with multiple negative endoscopic evaluations. Bloating has improved with reducing soda intake. He continues to have irregular bowel habits marked by the presence of hard, dry stools associated with straining, and intermittent sense of incomplete evacuation. We reviewed the following:  -- Start osmotic laxative in the form of magnesium  -- Add in fiber supplementation    # BRBPR  Colonoscopy in 2021 without concerning findings, suspected to have outlet bleeding.  He  continues to have episodic BRBPR associated with pain. We discussed a referral to CRS, of which he was interested in. In the meantime, continue the above recommendations.    # Heartburn and reflux  EGD with findings consistent with reactive gastropathy and peptic duodenitis, without erosive GERD. Symptoms have significantly improved with intentional weight loss. We discussed considering trial of stopping PPI given the weight loss, versus continuing. Refilled both omeprazole and Pepcid.    RTC 3 months    Thank you for this consultation. It was a pleasure to participate in the care of this patient; please contact us with any further questions.    57 minutes spent on the date of the encounter doing chart review, review of test results, patient visit and documentation        Again, thank you for allowing me to participate in the care of your patient.      Sincerely,    Bindu Matias PA-C

## 2023-04-13 ENCOUNTER — TELEPHONE (OUTPATIENT)
Dept: SURGERY | Facility: CLINIC | Age: 38
End: 2023-04-13
Payer: COMMERCIAL

## 2023-04-13 NOTE — TELEPHONE ENCOUNTER
[facility] Call Center    Phone Message    May a detailed message be left on voicemail: yes     Reason for Call: Appointment Intake      Referring Provider Name: Bindu Matias PA-C [place] [facility]    Diagnosis and/or Symptoms: Rectal bleeding    Scheduled first available - sending per guidelines - thank you!     Action Taken: Message routed to:  [facility] ([facility]): Colorectal     Travel Screening: Not Applicable

## 2023-04-14 NOTE — TELEPHONE ENCOUNTER
Diagnosis, Referred by & from: Rectal Bleeding   Appt date: 7/12/2023   NOTES STATUS DETAILS   OFFICE NOTE from referring provider [facility]:  4/12/23 - GI OV with MERCEDES Mcgee   OFFICE NOTE from other specialist Received / [facility]:  1/18/22 - GI OV with MERCEDES Toribio  3/27/19 - GI OV with Dr. Roxy Chisholm:  6/29/22, 7/8/21 - PCC OV with Dr. Melendrez  3/23/16, 7/1/15 - UC OV with Dr. Israel   DISCHARGE SUMMARY from hospital N/A    DISCHARGE REPORT from the ER [facility]:  11/27/18 - ED OV with Dr. Angela   OPERATIVE REPORT N/A    MEDICATION LIST Internal    LABS     BIOPSIES/PATHOLOGY RELATED TO DIAGNOSIS [facility]:  1/18/21 - Colon Biopsy (Case: )   DIAGNOSTIC PROCEDURES     COLONOSCOPY [facility]:  1/18/21 - Colonoscopy   UPPER ENDOSCOPY (EGD) [facility]:  1/23/20 - EGD   IMAGING (DISC & REPORT)      ULTRASOUND  (ENDOANAL/ENDORECTAL) [facility]:  7/15/19 - US Abdomen

## 2023-04-19 ENCOUNTER — TELEPHONE (OUTPATIENT)
Dept: GASTROENTEROLOGY | Facility: CLINIC | Age: 38
End: 2023-04-19
Payer: COMMERCIAL

## 2023-06-03 ENCOUNTER — HEALTH MAINTENANCE LETTER (OUTPATIENT)
Age: 38
End: 2023-06-03

## 2023-07-12 ENCOUNTER — MYC MEDICAL ADVICE (OUTPATIENT)
Dept: GASTROENTEROLOGY | Facility: CLINIC | Age: 38
End: 2023-07-12

## 2023-07-12 ENCOUNTER — PRE VISIT (OUTPATIENT)
Dept: SURGERY | Facility: CLINIC | Age: 38
End: 2023-07-12

## 2023-07-12 ENCOUNTER — OFFICE VISIT (OUTPATIENT)
Dept: SURGERY | Facility: CLINIC | Age: 38
End: 2023-07-12
Attending: PHYSICIAN ASSISTANT
Payer: COMMERCIAL

## 2023-07-12 VITALS
BODY MASS INDEX: 30.73 KG/M2 | WEIGHT: 180 LBS | HEART RATE: 79 BPM | SYSTOLIC BLOOD PRESSURE: 117 MMHG | DIASTOLIC BLOOD PRESSURE: 79 MMHG | OXYGEN SATURATION: 99 % | HEIGHT: 64 IN

## 2023-07-12 DIAGNOSIS — K64.8 INTERNAL HEMORRHOIDS: Primary | ICD-10-CM

## 2023-07-12 DIAGNOSIS — K62.5 BRBPR (BRIGHT RED BLOOD PER RECTUM): ICD-10-CM

## 2023-07-12 DIAGNOSIS — K62.5 RECTAL BLEEDING: ICD-10-CM

## 2023-07-12 ASSESSMENT — PAIN SCALES - GENERAL: PAINLEVEL: NO PAIN (0)

## 2023-07-12 NOTE — NURSING NOTE
"Chief Complaint   Patient presents with     New Patient     Rectal Bleeding       Vitals:    07/12/23 0740   BP: 117/79   BP Location: Left arm   Patient Position: Sitting   Cuff Size: Adult Regular   Pulse: 79   SpO2: 99%   Weight: 180 lb   Height: 5' 4\"       Body mass index is 30.9 kg/m .     Papo Hinojosa, EMT- P    "

## 2023-07-12 NOTE — PROGRESS NOTES
"Colon and Rectal Surgery Consult Clinic Note    Date: 2023     Referring provider:  Bindu Matias PA-C  [address] [place] [address]     RE: Kirby Patel  : 1985  WAGNER: 2023    Kirby Patel is a very pleasant 38 year old male who presents today for rectal bleeding.    HPI:  Bleeding improved in the past two weeks but for about 2 months was having streams of blood about three times a day. This was associated with some abdominal pain and some sharp anal pain. No change in bowel habits recently. Is on Benefiber once a day with increased water intake. No prolapsing tissue. No anorectal surgeries in the past. Dose not smoke. No anal intercourse. Follows with GI for irregular bowel habits with constipation. Colonoscopy  was normal with normal biopsies.    Physical Examination: Exam was chaperoned by Papo Hinojosa, EMT-P   /79 (BP Location: Left arm, Patient Position: Sitting, Cuff Size: Adult Regular)   Pulse 79   Ht 5' 4\"   Wt 180 lb   SpO2 99%   BMI 30.90 kg/m    General: alert, oriented, in no acute distress, sitting comfortably  HEENT: mucous membranes moist  Perianal external examination:  Perianal skin: Intact with no excoriation or lichenification.  Lesions: No evidence of an external lesion, nodularity, or induration in the perianal region.  Eversion of buttocks: There was not evidence of an anal fissure. Details: N/A.  Skin tags or external hemorrhoids: None.    Digital rectal examination: Was performed.   Sphincter tone: Good.  Palpable lesions: No.  Prostate: Normal.  Other: None.    Anoscopy: Was performed.   Hemorrhoids: Yes. Grade 2-3 internal hemorrhoids without active bleeding  Lesions: No    Assessment/Plan: 38 year old male with rectal bleeding, constipation, and internal hemorrhoids. We discussed hemorrhoid banding versus conservative management. He would like to first try increasing his fiber supplement and dietary fiber since he has had " some improvement with this. If bleeding persists, will have him return for hemorrhoid banding. Patient's questions were answered to his stated satisfaction and he is in agreement with this plan.     Medical history:  Past Medical History:   Diagnosis Date     Anxiety      Depressive disorder      Intractable vomiting with nausea, unspecified vomiting type 11/13/2016     Kidney stone on right side 11/13/2016     DIEGO (obstructive sleep apnea)- severe (AHI 35) 3/24/2022       Surgical history:  Past Surgical History:   Procedure Laterality Date     COLONOSCOPY       COLONOSCOPY N/A 1/18/2021    Procedure: COLONOSCOPY, WITH  BIOPSY;  Surgeon: Spencer Barraza MD;  Location: [facility] OR       Problem list:    Patient Active Problem List    Diagnosis Date Noted     Esophageal reflux 03/24/2022     Priority: Medium     Depressive disorder 03/24/2022     Priority: Medium     Anxiety 03/24/2022     Priority: Medium     DIEGO (obstructive sleep apnea)- severe (AHI 35) 03/24/2022     Priority: Medium     3/23/2022 [facility] Sleep Study (-) - AHI 35.3, RDI 38.3, Supine AHI 69.1, Lateral AHI 5, REM AHI 1.7, Low O2 78.0%, Time Spent ?88% 20.0 minutes / Time Spent ?89% 28.2 minutes.       Class 1 obesity due to excess calories with serious comorbidity and body mass index (BMI) of 32.0 to 32.9 in adult 03/24/2022     Priority: Low       Medications:  Current Outpatient Medications   Medication Sig Dispense Refill     ADVAIR DISKUS 250-50 MCG/ACT inhaler Inhale 1 puff into the lungs 2 times daily       albuterol (PROAIR HFA/PROVENTIL HFA/VENTOLIN HFA) 108 (90 Base) MCG/ACT inhaler Inhale 1 puff into the lungs as needed       amphetamine-dextroamphetamine (ADDERALL XR) 10 MG 24 hr capsule Take 10 mg by mouth every morning       amphetamine-dextroamphetamine (ADDERALL) 5 MG tablet Take 5-10 mg by mouth daily as needed       escitalopram (LEXAPRO) 20 MG tablet Take 1 tablet by mouth daily       famotidine (PEPCID) 20 MG  tablet Take 1 tablet (20 mg) by mouth 2 times daily 180 tablet 3     LORazepam (ATIVAN) 0.5 MG tablet Take 0.5 mg by mouth every 6 hours as needed       magnesium hydroxide (MOM) 2400 MG/10ML SUSP Take 5 mLs by mouth as needed for constipation       omeprazole (PRILOSEC) 40 MG DR capsule Take 1 capsule (40 mg) by mouth daily 90 capsule 3     ondansetron (ZOFRAN ODT) 4 MG ODT tab Take 1 tablet (4 mg) by mouth every 8 hours as needed for nausea 20 tablet 3     semaglutide (OZEMPIC, 0.25 OR 0.5 MG/DOSE,) 2 MG/1.5ML SOPN pen -Start Ozempic 0.25 mg weekly for 2 weeks then increase to 0.5 mg weekly for 1 month and then increase to 1.0 mg weekly thereafter (Patient not taking: Reported on 9/26/2022) 4.5 mL 0     Semaglutide, 1 MG/DOSE, 4 MG/3ML SOPN Inject 1 mg Subcutaneous once a week To be used after finishing 0.5 mg weekly (Patient not taking: Reported on 9/26/2022) 9 mL 3     verapamil (CALAN) 40 MG tablet Take  one tab twice daily 60 tablet 9     ZOLMitriptan (ZOMIG) 2.5 MG tablet Take 1 tablet (2.5 mg) by mouth at onset of headache for migraine May repeat in 2 hours. Max 4 tablets/24 hours. 18 tablet 9       Allergies:  Allergies   Allergen Reactions     Codeine Anaphylaxis     Hydromorphone Nausea and Vomiting     Zofran DOES NOT HELP, only compounds problem, PER PT.        Family history:  Family History   Problem Relation Age of Onset     Diabetes Mother      Obesity Mother      Hyperlipidemia Mother      Uterine Cancer Maternal Grandmother        Social history:  Social History     Tobacco Use     Smoking status: Never     Smokeless tobacco: Never   Substance Use Topics     Alcohol use: Not Currently    Marital status: single.  Occupation: PhD student.    Nursing Notes:   Papo Hinojosa, EMT  7/12/2023  7:44 AM  Signed  Chief Complaint   Patient presents with     New Patient     Rectal Bleeding       Vitals:    07/12/23 0740   BP: 117/79   BP Location: Left arm   Patient Position: Sitting   Cuff Size: Adult  "Regular   Pulse: 79   SpO2: 99%   Weight: 180 lb   Height: 5' 4\"       Body mass index is 30.9 kg/m .     Papo Hinojosa, EMT- P         30 minutes spent on the date of encounter performing chart review, history and exam, documentation and further activities as noted above with an additional 2 minutes for anoscopy.     CHILANGO Valdivia, NP-C  Colon and Rectal Surgery   [facility]    This note was created using speech recognition software and may contain unintended word substitutions.    "

## 2023-07-12 NOTE — PATIENT INSTRUCTIONS
Increase Benefiber to twice a day  Drink a lot of water  Increase dietary fiber  Follow up in 6 weeks if bleeding persists

## 2023-07-12 NOTE — LETTER
"2023       RE: Kirby Patel  [address]  [facility] [place] [address]         Dear Colleague,    Thank you for referring your patient, Kirby Patel, to the [facility] [place] at [facility]. Please see a copy of my visit note below.    Colon and Rectal Surgery Consult Clinic Note    Date: 2023     Referring provider:  Bindu Matias PA-C  [address] [place] [id number]     RE: Kirby Patel  : 1985  WAGNER: 2023    Kirby Patel is a very pleasant 38 year old male who presents today for rectal bleeding.    HPI:  Bleeding improved in the past two weeks but for about 2 months was having streams of blood about three times a day. This was associated with some abdominal pain and some sharp anal pain. No change in bowel habits recently. Is on Benefiber once a day with increased water intake. No prolapsing tissue. No anorectal surgeries in the past. Dose not smoke. No anal intercourse. Follows with GI for irregular bowel habits with constipation. Colonoscopy  was normal with normal biopsies.    Physical Examination: Exam was chaperoned by Papo Hinojosa, EMT-P   /79 (BP Location: Left arm, Patient Position: Sitting, Cuff Size: Adult Regular)   Pulse 79   Ht 5' 4\"   Wt 180 lb   SpO2 99%   BMI 30.90 kg/m    General: alert, oriented, in no acute distress, sitting comfortably  HEENT: mucous membranes moist  Perianal external examination:  Perianal skin: Intact with no excoriation or lichenification.  Lesions: No evidence of an external lesion, nodularity, or induration in the perianal region.  Eversion of buttocks: There was not evidence of an anal fissure. Details: N/A.  Skin tags or external hemorrhoids: None.    Digital rectal examination: Was performed.   Sphincter tone: Good.  Palpable lesions: No.  Prostate: Normal.  Other: None.    Anoscopy: Was performed. "   Hemorrhoids: Yes. Grade 2-3 internal hemorrhoids without active bleeding  Lesions: No    Assessment/Plan: 38 year old male with rectal bleeding, constipation, and internal hemorrhoids. We discussed hemorrhoid banding versus conservative management. He would like to first try increasing his fiber supplement and dietary fiber since he has had some improvement with this. If bleeding persists, will have him return for hemorrhoid banding. Patient's questions were answered to his stated satisfaction and he is in agreement with this plan.     Medical history:  Past Medical History:   Diagnosis Date    Anxiety     Depressive disorder     Intractable vomiting with nausea, unspecified vomiting type 11/13/2016    Kidney stone on right side 11/13/2016    DIEGO (obstructive sleep apnea)- severe (AHI 35) 3/24/2022       Surgical history:  Past Surgical History:   Procedure Laterality Date    COLONOSCOPY      COLONOSCOPY N/A 1/18/2021    Procedure: COLONOSCOPY, WITH  BIOPSY;  Surgeon: Spencer Barraza MD;  Location: [facility] OR       Problem list:    Patient Active Problem List    Diagnosis Date Noted    Esophageal reflux 03/24/2022     Priority: Medium    Depressive disorder 03/24/2022     Priority: Medium    Anxiety 03/24/2022     Priority: Medium    DIEGO (obstructive sleep apnea)- severe (AHI 35) 03/24/2022     Priority: Medium     3/23/2022 [facility] Sleep Study (-) - AHI 35.3, RDI 38.3, Supine AHI 69.1, Lateral AHI 5, REM AHI 1.7, Low O2 78.0%, Time Spent ?88% 20.0 minutes / Time Spent ?89% 28.2 minutes.      Class 1 obesity due to excess calories with serious comorbidity and body mass index (BMI) of 32.0 to 32.9 in adult 03/24/2022     Priority: Low       Medications:  Current Outpatient Medications   Medication Sig Dispense Refill    ADVAIR DISKUS 250-50 MCG/ACT inhaler Inhale 1 puff into the lungs 2 times daily      albuterol (PROAIR HFA/PROVENTIL HFA/VENTOLIN HFA) 108 (90 Base) MCG/ACT inhaler Inhale 1 puff  into the lungs as needed      amphetamine-dextroamphetamine (ADDERALL XR) 10 MG 24 hr capsule Take 10 mg by mouth every morning      amphetamine-dextroamphetamine (ADDERALL) 5 MG tablet Take 5-10 mg by mouth daily as needed      escitalopram (LEXAPRO) 20 MG tablet Take 1 tablet by mouth daily      famotidine (PEPCID) 20 MG tablet Take 1 tablet (20 mg) by mouth 2 times daily 180 tablet 3    LORazepam (ATIVAN) 0.5 MG tablet Take 0.5 mg by mouth every 6 hours as needed      magnesium hydroxide (MOM) 2400 MG/10ML SUSP Take 5 mLs by mouth as needed for constipation      omeprazole (PRILOSEC) 40 MG DR capsule Take 1 capsule (40 mg) by mouth daily 90 capsule 3    ondansetron (ZOFRAN ODT) 4 MG ODT tab Take 1 tablet (4 mg) by mouth every 8 hours as needed for nausea 20 tablet 3    semaglutide (OZEMPIC, 0.25 OR 0.5 MG/DOSE,) 2 MG/1.5ML SOPN pen -Start Ozempic 0.25 mg weekly for 2 weeks then increase to 0.5 mg weekly for 1 month and then increase to 1.0 mg weekly thereafter (Patient not taking: Reported on 9/26/2022) 4.5 mL 0    Semaglutide, 1 MG/DOSE, 4 MG/3ML SOPN Inject 1 mg Subcutaneous once a week To be used after finishing 0.5 mg weekly (Patient not taking: Reported on 9/26/2022) 9 mL 3    verapamil (CALAN) 40 MG tablet Take  one tab twice daily 60 tablet 9    ZOLMitriptan (ZOMIG) 2.5 MG tablet Take 1 tablet (2.5 mg) by mouth at onset of headache for migraine May repeat in 2 hours. Max 4 tablets/24 hours. 18 tablet 9       Allergies:  Allergies   Allergen Reactions    Codeine Anaphylaxis    Hydromorphone Nausea and Vomiting     Zofran DOES NOT HELP, only compounds problem, PER PT.        Family history:  Family History   Problem Relation Age of Onset    Diabetes Mother     Obesity Mother     Hyperlipidemia Mother     Uterine Cancer Maternal Grandmother        Social history:  Social History     Tobacco Use    Smoking status: Never    Smokeless tobacco: Never   Substance Use Topics    Alcohol use: Not Currently    Marital  "status: single.  Occupation: PhD student.    Nursing Notes:   Papo Hinojosa, EMT  7/12/2023  7:44 AM  Signed  Chief Complaint   Patient presents with    New Patient    Rectal Bleeding       Vitals:    07/12/23 0740   BP: 117/79   BP Location: Left arm   Patient Position: Sitting   Cuff Size: Adult Regular   Pulse: 79   SpO2: 99%   Weight: 180 lb   Height: 5' 4\"       Body mass index is 30.9 kg/m .     Papo Hinojosa, CHRIS- P         30 minutes spent on the date of encounter performing chart review, history and exam, documentation and further activities as noted above with an additional 2 minutes for anoscopy.       This note was created using speech recognition software and may contain unintended word substitutions.        Again, thank you for allowing me to participate in the care of your patient.      Sincerely,    CHILANGO Turner CNP      "

## 2023-07-17 NOTE — TELEPHONE ENCOUNTER
Called to remind patient of their upcoming appointment with our GI clinic, on Thursday 7/27/2023 at 7:15AM with Sol Vo . This appointment is scheduled as an in-person appt. Please arrive 15 minutes early to check in for your appointment. , if your appointment is virtual (video or telephone) you need to be in [place] for the visit. To reschedule or cancel appointment patient needs to call [phone] option 1.  To confirm appointment patient advised to call back.     Namita Parker MA

## 2023-07-27 ENCOUNTER — OFFICE VISIT (OUTPATIENT)
Dept: GASTROENTEROLOGY | Facility: CLINIC | Age: 38
End: 2023-07-27
Payer: COMMERCIAL

## 2023-07-27 ENCOUNTER — LAB (OUTPATIENT)
Dept: LAB | Facility: CLINIC | Age: 38
End: 2023-07-27
Payer: COMMERCIAL

## 2023-07-27 VITALS
SYSTOLIC BLOOD PRESSURE: 121 MMHG | DIASTOLIC BLOOD PRESSURE: 80 MMHG | BODY MASS INDEX: 32.44 KG/M2 | HEIGHT: 64 IN | OXYGEN SATURATION: 98 % | WEIGHT: 190 LBS | HEART RATE: 82 BPM

## 2023-07-27 DIAGNOSIS — R10.13 DYSPEPSIA: ICD-10-CM

## 2023-07-27 DIAGNOSIS — R79.89 ELEVATED LFTS: ICD-10-CM

## 2023-07-27 DIAGNOSIS — K62.5 BRBPR (BRIGHT RED BLOOD PER RECTUM): Primary | ICD-10-CM

## 2023-07-27 DIAGNOSIS — K21.9 GASTROESOPHAGEAL REFLUX DISEASE WITHOUT ESOPHAGITIS: ICD-10-CM

## 2023-07-27 LAB
FERRITIN SERPL-MCNC: 36 NG/ML (ref 31–409)
HAV IGG SER QL IA: REACTIVE
HBV CORE AB SERPL QL IA: NONREACTIVE
HBV CORE IGM SERPL QL IA: NONREACTIVE
HBV SURFACE AB SERPL IA-ACNC: >1000 M[IU]/ML
HBV SURFACE AB SERPL IA-ACNC: REACTIVE M[IU]/ML
HBV SURFACE AG SERPL QL IA: NONREACTIVE
HCV AB SERPL QL IA: NONREACTIVE
IRON BINDING CAPACITY (ROCHE): 275 UG/DL (ref 240–430)
IRON SATN MFR SERPL: 35 % (ref 15–46)
IRON SERPL-MCNC: 97 UG/DL (ref 61–157)

## 2023-07-27 PROCEDURE — 99000 SPECIMEN HANDLING OFFICE-LAB: CPT | Performed by: PATHOLOGY

## 2023-07-27 PROCEDURE — 86038 ANTINUCLEAR ANTIBODIES: CPT | Performed by: PHYSICIAN ASSISTANT

## 2023-07-27 PROCEDURE — 82103 ALPHA-1-ANTITRYPSIN TOTAL: CPT | Performed by: PHYSICIAN ASSISTANT

## 2023-07-27 PROCEDURE — 82728 ASSAY OF FERRITIN: CPT | Performed by: PATHOLOGY

## 2023-07-27 PROCEDURE — 36415 COLL VENOUS BLD VENIPUNCTURE: CPT | Performed by: PATHOLOGY

## 2023-07-27 PROCEDURE — 84075 ASSAY ALKALINE PHOSPHATASE: CPT | Mod: 90 | Performed by: PATHOLOGY

## 2023-07-27 PROCEDURE — 86706 HEP B SURFACE ANTIBODY: CPT | Performed by: PHYSICIAN ASSISTANT

## 2023-07-27 PROCEDURE — 84080 ASSAY ALKALINE PHOSPHATASES: CPT | Mod: 90 | Performed by: PATHOLOGY

## 2023-07-27 PROCEDURE — 83516 IMMUNOASSAY NONANTIBODY: CPT | Mod: 90 | Performed by: PATHOLOGY

## 2023-07-27 PROCEDURE — 83540 ASSAY OF IRON: CPT | Performed by: PATHOLOGY

## 2023-07-27 PROCEDURE — 86803 HEPATITIS C AB TEST: CPT | Performed by: PHYSICIAN ASSISTANT

## 2023-07-27 PROCEDURE — 86381 MITOCHONDRIAL ANTIBODY EACH: CPT | Performed by: PHYSICIAN ASSISTANT

## 2023-07-27 PROCEDURE — 87340 HEPATITIS B SURFACE AG IA: CPT | Performed by: PHYSICIAN ASSISTANT

## 2023-07-27 PROCEDURE — 99214 OFFICE O/P EST MOD 30 MIN: CPT | Performed by: PHYSICIAN ASSISTANT

## 2023-07-27 PROCEDURE — 86704 HEP B CORE ANTIBODY TOTAL: CPT | Performed by: PHYSICIAN ASSISTANT

## 2023-07-27 PROCEDURE — 86705 HEP B CORE ANTIBODY IGM: CPT | Performed by: PHYSICIAN ASSISTANT

## 2023-07-27 PROCEDURE — 82390 ASSAY OF CERULOPLASMIN: CPT | Performed by: PHYSICIAN ASSISTANT

## 2023-07-27 PROCEDURE — 83550 IRON BINDING TEST: CPT | Performed by: PATHOLOGY

## 2023-07-27 PROCEDURE — 86708 HEPATITIS A ANTIBODY: CPT | Performed by: PHYSICIAN ASSISTANT

## 2023-07-27 ASSESSMENT — PAIN SCALES - GENERAL: PAINLEVEL: NO PAIN (0)

## 2023-07-27 NOTE — LETTER
7/27/2023         RE: Kirby Patel  [address]  [facility] [address]        Dear Colleague,    Thank you for referring your patient, Kirby Patel, to the [facility] [place]. Please see a copy of my visit note below.    Gastroenterology Visit for: Kirby Patel 1985   MRN: [id number]     Reason for Visit:  chief complaint    Referred by: Self  / No address on file  Patient Care Team:  [facility] as PCP - General (Clinic)  Spencer Barraza MD as MD (Gastroenterology)  Branden Melendrez MD as Referring Physician (Pediatrics)  Roderick Hargrove MD as MD (Neurology)  Rico Burciaga PA-C as Physician Assistant (Physician Assistant)  Patel Smith MD as MD (Cardiology)  Gricel Childers APRN CNP as Nurse Practitioner (Neurology)  Ivana Muñoz PA-C (Inactive) as Physician Assistant (Gastroenterology)  Gricel Childers APRN CNP as Assigned Neuroscience Provider  Caty Palomo RD as Registered Dietitian (Dietitian, Registered)  Caroline Coles MD as Assigned Endocrinology Provider  Bloch, Lauren Turner, RPH as Pharmacist (Pharmacist)  Bloch, Lauren Turner, RPH as Assigned MTM Pharmacist  Bindu Matias PA-C as Physician Assistant (Gastroenterology)  Nisha Hartmann APRN CNP as Nurse Practitioner (Colon & Rectal)  Bindu Matias PA-C as Assigned Cancer Care Provider    History of Present Illness:   Kirby Patel is a 38 year old male with significant past medical history pertinent for anxiety, depression, DIEGO, migraine headaches who is presenting as a follow up patient however as a new patient to myself with a chief complaint of dyspepsia/GERD.    Interval History July 27, 2023:    Current acid suppressive medication includes Omeprazole in the morning on an empty stomach and Famotidine 20 mg twice daily prior to meals. Continues to  have indigestion later in the evening/prior to bed is relieved with the use of TUMs. Denies heartburn/regurgitation.      Currently Kirby is having bowel movement 2-3 times daily consistent with [place] Stool Scale Type 4.  Since his last office visit he was evaluated by the colorectal team and advised to increase daily fiber supplementation/daily water intake.  With increase in fiber has noted less frequent red blood per rectum. With more agressive wiping has noted red blood on the toilet paper.     Denies weight loss, nausea, emesis, dysphagia, odynophagia, heartburn, regurgitation, abdominal pain, diarrhea, constipation (< 3 stools per week), melena and hematochezia.     No family history or GI related malignancy (esophageal, gastric, pancreatic, liver or colon) or family history of IBD/celiac disease.     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    ---------------------------------------------------------------------------------------------------------------------------------------------------------  4/12/2023 [facility] Bindu Matias PA-C:     37 year old male with PMH of anxiety, depression, DIEGO, migraine headaches, presenting to GI clinic for follow-up.     Kirby has previously established in our clinic with Dr. Ofelia Milligan and Ivana Muñoz PA-C. Briefly, he was initially evaluated by a gastroenterologist in [place], where he was diagnosed with irritable bowel syndrome and GERD. He was later seen at [facility] between 2015 and 2016, at which time he had an EGD and colonoscopy for blood in stools. Per patient, this was unremarkable. At time of consultations at our clinic, he reported a variable stool pattern with loose stools alternating with constipation. He underwent EGD 1/23/2020 with normal esophagus, stomach with erythema and a few erosions in the gastric antrum, with biopsies suggestive of reactive gastropathy, without evidence of H  pylori. Duodenal biopsies consistent with peptic duodenitis. Patient underwent colonoscopy 1/18/2021 without source of symptoms. He had previously been given a course of Rifaximin for symptoms, with initial course offering symptom improvement.     At most recent follow-up January 2022, he noted bloating and constipation, followed by diarrhea. He was recommended to start magnesium 400 mg daily. In regard to GERD, he was recommended to continue omeprazole.     Today, Kirby notes he has lost weight; he notes that around the Fall of  Last year, he was formaly diagnosed and placed on ADHD medications and started to lose weight following this. His partner started a weight loss journey around that time, and this has changed Kirby's eating habits as well, noting decreased intake of take-out (previously daily), and decreased volume of intake. He estimates with these changes, he has lost 25 pounds intentionally. He notes that this has helped the heartburn and reflux significantly. He uses famotidine daily to as needed, which helps. Remains on Prilosec.     However, he continues to have periods of time where he notes he has BRBPR with bowel movements. He is trying to strain less and spend less time on the toilet. He notes there was a time where he was taking fiber, and instead of helping with the stools, he notes he had increased bloating and constipated. Currently, he notes he has ongoing variable bowel habits. He notes the bloating has been improved by cutting out soda. Coffee can augment bloating as well. He uses Gas-X if needed. He is having bowel movements ranging from once daily, which then is followed by what he describes as a release, with 5-6 clustered bowel movements that day. He estimates that this has happened 3-4 times in the last few months. Stools can often be hard and dry, associated with straining and a sense of incomplete evacuation. He is not currently using magnesium.        Esophageal Questionnaire(s)    BEDQ  Questionnaire       No data to display                   No data to display                Eckardt Questionnaire       No data to display                Promis 10 Questionnaire       No data to display                    STUDIES & PROCEDURES:    EGD:     1/2020 [facility]     FINAL DIAGNOSIS:   A. DUODENAL BIOPSY:   - Duodenal mucosa with foveolar metaplasia consistent with peptic   duodenitis     B. GASTRIC BIOPSY:   - Mild chronic gastritis   - Features of reactive gastropathy   - No H. pylori like organisms identified on routine staining   - Negative for intestinal metaplasia or dysplasia     Colonoscopy:    1/8/2021  Findings:       The terminal ileum appeared normal. Biopsies were taken with a cold        forceps for histology.        The entire examined colon appeared normal.        Biopsies for histology were taken with a cold forceps from the right        colon, left colon and rectum for evaluation of microscopic colitis.     Impression:          - The examined portion of the ileum was normal. Biopsied.                        - The entire examined colon is normal.                        - Biopsies were taken with a cold forceps from the right                        colon, left colon and rectum for evaluation of                        microscopic colitis.                        - Retroflexion was not performed as the rectal vault was                        small and patient would not tolerate retroflexion.     FINAL DIAGNOSIS:   A. TERMINAL ILEUM, BIOPSY:   - Ileal mucosa with no significant histologic abnormality   - No evidence of inflammation     B. RIGHT COLON, BIOPSY:   - Colonic mucosa with no significant histologic abnormality   - No evidence of inflammation     C. LEFT COLON, BIOPSY:   - Colonic mucosa with no significant histologic abnormality   - No evidence of inflammation     D. RECTUM, BIOPSY:   - Rectal mucosa with no significant histologic abnormality   - No evidence of inflammation      EndoFLIP  directed at the UES or LES (8cm (EF-325) balloon length or 16cm (EF-322) balloon length):   Date:  8cm balloon  Balloon inflation Balloon pressure CSA (mm^2) DI (mm^2/mmHg) Dmin (mm) Compliance   20 (ladmark ID)        30        40        50           16cm balloon  Balloon inflation Balloon pressure CSA (mm^2) DI (mm^2/mmHg) Dmin (mm) Compliance   30 (ladmark ID)        40        50        60        70           High Resolution Manometry:    PH/Impedance:     Bravo:    CT:    Esophagram:    FL VSS:     GES:    U/S:     7/15/2019  FINDINGS:   The liver has a normal echotexture with no focal abnormality.  Visualized portions of the pancreas are normal. The spleen is normal  in size at 9.8 cm. The gallbladder is normal. Sonographic 's  sign is negative. There are no gallstones. Common duct measures 1 mm.  The aorta and IVC are normal. The right kidney measures 11.2 cm. The  left kidney measures 10.9 cm. There is no hydronephrosis.                                                               IMPRESSION:      Normal abdominal ultrasound.       XRAY:    Other:       Prior medical records were reviewed including, but not limited to, notes from referring providers, lab work, radiographic tests, and other diagnostic tests. Pertinent results were summarized above.     History     Past Medical History:   Diagnosis Date    Anxiety     Depressive disorder     Intractable vomiting with nausea, unspecified vomiting type 11/13/2016    Kidney stone on right side 11/13/2016    DIEGO (obstructive sleep apnea)- severe (AHI 35) 3/24/2022       Past Surgical History:   Procedure Laterality Date    COLONOSCOPY      COLONOSCOPY N/A 1/18/2021    Procedure: COLONOSCOPY, WITH  BIOPSY;  Surgeon: Spencer Barraza MD;  [place] [facility] [place]       Social History     Socioeconomic History    Marital status: Single     Spouse name: Not on file    Number of children: Not on file    Years of education: Not on file    Highest education  level: Not on file   Occupational History    Not on file   Tobacco Use    Smoking status: Never    Smokeless tobacco: Never   Substance and Sexual Activity    Alcohol use: Not Currently    Drug use: No    Sexual activity: Yes     Partners: Male   Other Topics Concern    Not on file   Social History Narrative    Not on file     Social Determinants of Health     Financial Resource Strain: Not on file   Food Insecurity: Not on file   Transportation Needs: Not on file   Physical Activity: Not on file   Stress: Not on file   Social Connections: Not on file   Intimate Partner Violence: Not on file   Housing Stability: Not on file       Family History   Problem Relation Age of Onset    Diabetes Mother     Obesity Mother     Hyperlipidemia Mother     Uterine Cancer Maternal Grandmother      Family history reviewed and edited as appropriate    Medications and Allergies:     Outpatient Encounter Medications as of 7/27/2023   Medication Sig Dispense Refill    ADVAIR DISKUS 250-50 MCG/ACT inhaler Inhale 1 puff into the lungs 2 times daily      albuterol (PROAIR HFA/PROVENTIL HFA/VENTOLIN HFA) 108 (90 Base) MCG/ACT inhaler Inhale 1 puff into the lungs as needed      amphetamine-dextroamphetamine (ADDERALL XR) 10 MG 24 hr capsule Take 10 mg by mouth every morning      amphetamine-dextroamphetamine (ADDERALL) 5 MG tablet Take 5-10 mg by mouth daily as needed      escitalopram (LEXAPRO) 20 MG tablet Take 1 tablet by mouth daily      famotidine (PEPCID) 20 MG tablet Take 1 tablet (20 mg) by mouth 2 times daily 180 tablet 3    LORazepam (ATIVAN) 0.5 MG tablet Take 0.5 mg by mouth every 6 hours as needed      magnesium hydroxide (MOM) 2400 MG/10ML SUSP Take 5 mLs by mouth as needed for constipation      omeprazole (PRILOSEC) 40 MG DR capsule Take 1 capsule (40 mg) by mouth daily 90 capsule 3    ondansetron (ZOFRAN ODT) 4 MG ODT tab Take 1 tablet (4 mg) by mouth every 8 hours as needed for nausea 20 tablet 3    semaglutide (OZEMPIC,  0.25 OR 0.5 MG/DOSE,) 2 MG/1.5ML SOPN pen -Start Ozempic 0.25 mg weekly for 2 weeks then increase to 0.5 mg weekly for 1 month and then increase to 1.0 mg weekly thereafter (Patient not taking: Reported on 9/26/2022) 4.5 mL 0    Semaglutide, 1 MG/DOSE, 4 MG/3ML SOPN Inject 1 mg Subcutaneous once a week To be used after finishing 0.5 mg weekly (Patient not taking: Reported on 9/26/2022) 9 mL 3    verapamil (CALAN) 40 MG tablet Take  one tab twice daily 60 tablet 9    ZOLMitriptan (ZOMIG) 2.5 MG tablet Take 1 tablet (2.5 mg) by mouth at onset of headache for migraine May repeat in 2 hours. Max 4 tablets/24 hours. 18 tablet 9     No facility-administered encounter medications on file as of 7/27/2023.        Allergies   Allergen Reactions    Codeine Anaphylaxis    Hydromorphone Nausea and Vomiting     Zofran DOES NOT HELP, only compounds problem, PER PT.         Review of systems:  A full 10 point review of systems was obtained and was negative except for the pertinent positives and negatives stated within the HPI.    Objective Findings:   Physical Exam:    Constitutional: There were no vitals taken for this visit.  General: Alert, cooperative, no distress, well-appearing  Head: Atraumatic, normocephalic, no obvious abnormalities   Eyes: Sclera anicteric, no obvious conjunctival hemorrhage   Nose: Nares normal, no obvious malformation, no obvious rhinorrhea   Respiratory: Resting comfortably, no apparent distress, no cough.   Gastrointestinal: Soft, non-distended  Skin: No jaundice, no obvious rash  Neurologic: AAOx3, no obvious neurologic abnormality  Psychiatric: Normal Affect, appropriate mood  Extremities: No obvious edema, no obvious malformation     Labs, Radiology, Pathology     Lab Results   Component Value Date    WBC 6.8 04/26/2022    WBC 7.8 11/27/2018    WBC 7.7 09/02/2017    HGB 12.9 (L) 04/26/2022    HGB 13.6 11/27/2018    HGB 15.0 09/02/2017     04/26/2022     11/27/2018      09/02/2017    TRIG 113 06/29/2022    ALT 78 (H) 04/26/2022    ALT 78 (H) 11/27/2018    ALT 95 (H) 12/11/2016    AST 43 04/26/2022    AST 33 11/27/2018    AST 55 (H) 12/11/2016     04/26/2022     11/27/2018     09/02/2017    BUN 17 04/26/2022    BUN 18 11/27/2018    BUN 19 09/02/2017    CO2 25 04/26/2022    CO2 25 11/27/2018    CO2 23 09/02/2017    TSH 0.79 12/11/2016    INR 1.04 12/11/2016        Liver Function Studies -   Recent Labs   Lab Test 04/26/22  1111   PROTTOTAL 7.7   ALBUMIN 3.6   BILITOTAL 0.3   ALKPHOS 158*   AST 43   ALT 78*          Patient Active Problem List    Diagnosis Date Noted    Esophageal reflux 03/24/2022     Priority: Medium    Depressive disorder 03/24/2022     Priority: Medium    Anxiety 03/24/2022     Priority: Medium    DIEGO (obstructive sleep apnea)- severe (AHI 35) 03/24/2022     Priority: Medium     3/23/2022 [facility] Sleep Study (-) - AHI 35.3, RDI 38.3, Supine AHI 69.1, Lateral AHI 5, REM AHI 1.7, Low O2 78.0%, Time Spent ?88% 20.0 minutes / Time Spent ?89% 28.2 minutes.      Class 1 obesity due to excess calories with serious comorbidity and body mass index (BMI) of 32.0 to 32.9 in adult 03/24/2022     Priority: Low      Assessment and Plan   Assessment/Plan:    Kirby Patel is a 38 year old male with significant past medical history pertinent for anxiety, depression, DIEGO, migraine headaches who is presenting as a follow up patient however as a new patient to myself with a chief complaint of dyspepsia/GERD.    # Bloating - Improved  # Irregular bowel habits - Improved  Chronic history of irregular bowel habits, with multiple negative endoscopic evaluations.  Now with reports of 2-3 stools daily consistent with [place] stool scale type IV.  No alarm signs other than bright red blood per rectum which has been evaluated by colorectal surgery as per below.    - Increase daily fiber supplementation to 2 tablespoons daily this can be increased to 3  tablespoons daily if needed    # BRBPR  Colonoscopy in 2021 that was unremarkable, suspected to have outlet bleeding.  Patient was seen by colorectal surgery team 7/12/2023 with findings notable for internal hemorrhoids.  Options of hemorrhoid banding versus conservative management were discussed.  Kirby has since increased his daily fiber supplementation and water intake with an improvement in symptoms.    - Follow-up with colorectal surgery if you wish to proceed with hemorrhoidal banding  - Increase daily fiber supplementation to 2 tablespoons daily this can be increased to 3 tablespoons daily if needed    # GERD  # Dyspepsia   1/2020 EGD with findings notable for reactive gastropathy and peptic duodenitis, without erosive GERD.  Biopsies negative for H. pylori, intestinal metaplasia or dysplasia. Duodenal biopsies with foveolar metaplasia consistent with peptic duodenitis.  Overall symptoms have improved with focus on healthy dietary habits and subsequent intentional weight loss.  Now with concern for indigestion/dyspepsia occurring in the late evening hours.  Denies breakthrough symptoms of heartburn/regurgitation and symptoms of dysphagia.    - Continue Omeprazole 40 mg once daily, best taken on an empty stomach at least 30 - 60 minutes prior to the first meal of the day   - Reflux friendly lifestyle modifications as directed within AVS  - Okay to use Famotidine 20 mg twice daily prior to meals  - Can trial FDgard two capsules prior to the last meal of the day, if no improvement increase to two capsules twice daily prior to meals    #Elevated LFT  Chronic mild elevation in ALT <2x ULN (7+ years) now with new mild elevation in alkaline phosphatase. Ultrasound of the abdomen 2019 with normal echotexture of the liver without focal abnormality.    - Chronic liver disease panel including viral hepatitis panel and autoimmune evaluation.  Pending results would then consider consultation to hepatology.        Follow up  plan:   Return to clinic 6 months and as needed.    The risks and benefits of my recommendations, as well as other treatment options were discussed with the patient and any available family today. All questions were answered.     Follow up: As planned above. Today, I personally spent 27 minutes in direct face to face time with the patient, of which greater than 50% of the time was spent in patient education and counseling as described above. Approximately 12 minutes were spent on indirect care associated with the patient's consultation including but not limited to review of: patient medical records to date, clinic visits, hospital records, lab results, imaging studies, procedural documentation, and coordinating care with other providers. The findings from this review are summarized in the above note. All of the above accounted for a cumulative time of 39 minutes and was performed on the date of service.     The patient verbalized understanding of the plan and was appreciative for the time spent and information provided during the office visit.       Documentation assisted by voice recognition and documentation system.          Again, thank you for allowing me to participate in the care of your patient.      Sincerely,    Sol Vo PA-C

## 2023-07-27 NOTE — NURSING NOTE
"Chief Complaint   Patient presents with    Follow Up       Vitals:    07/27/23 0749   BP: 121/80   Pulse: 82   SpO2: 98%   Weight: 86.2 kg (190 lb)   Height: 1.626 m (5' 4\")       Body mass index is 32.61 kg/m .    Namita Parker MA    "

## 2023-07-27 NOTE — PROGRESS NOTES
Gastroenterology Visit for: Kirby Patel 1985   MRN: [id number]     Reason for Visit:  chief complaint    Referred by: Self  / No address on file  Patient Care Team:  [facility] as PCP - General (Clinic)  Spencer Barraza MD as MD (Gastroenterology)  Branden Melendrez MD as Referring Physician (Pediatrics)  Roderick Hargrove MD as MD (Neurology)  Rico Burciaga PA-C as Physician Assistant (Physician Assistant)  Patel Smith MD as MD (Cardiology)  Gricel Childers APRN CNP as Nurse Practitioner (Neurology)  Ivana Muñoz PA-C (Inactive) as Physician Assistant (Gastroenterology)  Gricel Childers APRN CNP as Assigned Neuroscience Provider  Caty Palomo RD as Registered Dietitian (Dietitian, Registered)  Caroline Coles MD as Assigned Endocrinology Provider  Bloch, Lauren Turner, RPH as Pharmacist (Pharmacist)  Bloch, Lauren Turner, RPH as Assigned MTM Pharmacist  Bindu Matias PA-C as Physician Assistant (Gastroenterology)  Nisha Hartmann APRN CNP as Nurse Practitioner (Colon & Rectal)  Bindu Matias PA-C as Assigned Cancer Care Provider    History of Present Illness:   Kirby Patel is a 38 year old male with significant past medical history pertinent for anxiety, depression, DIEGO, migraine headaches who is presenting as a follow up patient however as a new patient to myself with a chief complaint of dyspepsia/GERD.    Interval History July 27, 2023:    Current acid suppressive medication includes Omeprazole in the morning on an empty stomach and Famotidine 20 mg twice daily prior to meals. Continues to have indigestion later in the evening/prior to bed is relieved with the use of TUMs. Denies heartburn/regurgitation.      Currently Kirby is having bowel movement 2-3 times daily consistent with [place] Stool Scale Type 4.  Since his last office visit he was evaluated by the  colorectal team and advised to increase daily fiber supplementation/daily water intake.  With increase in fiber has noted less frequent red blood per rectum. With more agressive wiping has noted red blood on the toilet paper.     Denies weight loss, nausea, emesis, dysphagia, odynophagia, heartburn, regurgitation, abdominal pain, diarrhea, constipation (< 3 stools per week), melena and hematochezia.     No family history or GI related malignancy (esophageal, gastric, pancreatic, liver or colon) or family history of IBD/celiac disease.     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    ---------------------------------------------------------------------------------------------------------------------------------------------------------  4/12/2023 SOURAV Matias PA-C:     37 year old male with PMH of anxiety, depression, DIEGO, migraine headaches, presenting to GI clinic for follow-up.     Kirby has previously established in our clinic with Dr. Ofelia Milligan and Ivana Muñoz PA-C. Briefly, he was initially evaluated by a gastroenterologist in [place], where he was diagnosed with irritable bowel syndrome and GERD. He was later seen at [facility] between 2015 and 2016, at which time he had an EGD and colonoscopy for blood in stools. Per patient, this was unremarkable. At time of consultations at our clinic, he reported a variable stool pattern with loose stools alternating with constipation. He underwent EGD 1/23/2020 with normal esophagus, stomach with erythema and a few erosions in the gastric antrum, with biopsies suggestive of reactive gastropathy, without evidence of H pylori. Duodenal biopsies consistent with peptic duodenitis. Patient underwent colonoscopy 1/18/2021 without source of symptoms. He had previously been given a course of Rifaximin for symptoms, with initial course offering symptom improvement.     At most recent follow-up  January 2022, he noted bloating and constipation, followed by diarrhea. He was recommended to start magnesium 400 mg daily. In regard to GERD, he was recommended to continue omeprazole.     Today, Kirby notes he has lost weight; he notes that around the Fall of  Last year, he was formaly diagnosed and placed on ADHD medications and started to lose weight following this. His partner started a weight loss journey around that time, and this has changed Kirby's eating habits as well, noting decreased intake of take-out (previously daily), and decreased volume of intake. He estimates with these changes, he has lost 25 pounds intentionally. He notes that this has helped the heartburn and reflux significantly. He uses famotidine daily to as needed, which helps. Remains on Prilosec.     However, he continues to have periods of time where he notes he has BRBPR with bowel movements. He is trying to strain less and spend less time on the toilet. He notes there was a time where he was taking fiber, and instead of helping with the stools, he notes he had increased bloating and constipated. Currently, he notes he has ongoing variable bowel habits. He notes the bloating has been improved by cutting out soda. Coffee can augment bloating as well. He uses Gas-X if needed. He is having bowel movements ranging from once daily, which then is followed by what he describes as a release, with 5-6 clustered bowel movements that day. He estimates that this has happened 3-4 times in the last few months. Stools can often be hard and dry, associated with straining and a sense of incomplete evacuation. He is not currently using magnesium.        Esophageal Questionnaire(s)    BEDQ Questionnaire       No data to display                   No data to display                Eckardt Questionnaire       No data to display                Promis 10 Questionnaire       No data to display                    STUDIES & PROCEDURES:    EGD:     1/2020 [facility]      FINAL DIAGNOSIS:   A. DUODENAL BIOPSY:   - Duodenal mucosa with foveolar metaplasia consistent with peptic   duodenitis     B. GASTRIC BIOPSY:   - Mild chronic gastritis   - Features of reactive gastropathy   - No H. pylori like organisms identified on routine staining   - Negative for intestinal metaplasia or dysplasia     Colonoscopy:    1/8/2021  Findings:       The terminal ileum appeared normal. Biopsies were taken with a cold        forceps for histology.        The entire examined colon appeared normal.        Biopsies for histology were taken with a cold forceps from the right        colon, left colon and rectum for evaluation of microscopic colitis.     Impression:          - The examined portion of the ileum was normal. Biopsied.                        - The entire examined colon is normal.                        - Biopsies were taken with a cold forceps from the right                        colon, left colon and rectum for evaluation of                        microscopic colitis.                        - Retroflexion was not performed as the rectal vault was                        small and patient would not tolerate retroflexion.     FINAL DIAGNOSIS:   A. TERMINAL ILEUM, BIOPSY:   - Ileal mucosa with no significant histologic abnormality   - No evidence of inflammation     B. RIGHT COLON, BIOPSY:   - Colonic mucosa with no significant histologic abnormality   - No evidence of inflammation     C. LEFT COLON, BIOPSY:   - Colonic mucosa with no significant histologic abnormality   - No evidence of inflammation     D. RECTUM, BIOPSY:   - Rectal mucosa with no significant histologic abnormality   - No evidence of inflammation      EndoFLIP directed at the UES or LES (8cm (EF-325) balloon length or 16cm (EF-322) balloon length):   Date:  8cm balloon  Balloon inflation Balloon pressure CSA (mm^2) DI (mm^2/mmHg) Dmin (mm) Compliance   20 (ladmark ID)        30        40        50           16cm  balloon  Balloon inflation Balloon pressure CSA (mm^2) DI (mm^2/mmHg) Dmin (mm) Compliance   30 (ladmark ID)        40        50        60        70           High Resolution Manometry:    PH/Impedance:     Bravo:    CT:    Esophagram:    FL VSS:     GES:    U/S:     7/15/2019  FINDINGS:   The liver has a normal echotexture with no focal abnormality.  Visualized portions of the pancreas are normal. The spleen is normal  in size at 9.8 cm. The gallbladder is normal. Sonographic 's  sign is negative. There are no gallstones. Common duct measures 1 mm.  The aorta and IVC are normal. The right kidney measures 11.2 cm. The  left kidney measures 10.9 cm. There is no hydronephrosis.                                                               IMPRESSION:      Normal abdominal ultrasound.       XRAY:    Other:       Prior medical records were reviewed including, but not limited to, notes from referring providers, lab work, radiographic tests, and other diagnostic tests. Pertinent results were summarized above.     History     Past Medical History:   Diagnosis Date    Anxiety     Depressive disorder     Intractable vomiting with nausea, unspecified vomiting type 11/13/2016    Kidney stone on right side 11/13/2016    DIEGO (obstructive sleep apnea)- severe (AHI 35) 3/24/2022       Past Surgical History:   Procedure Laterality Date    COLONOSCOPY      COLONOSCOPY N/A 1/18/2021    Procedure: COLONOSCOPY, WITH  BIOPSY;  Surgeon: Spencer Barraza MD;  [place] [facility] [place]       Social History     Socioeconomic History    Marital status: Single     Spouse name: Not on file    Number of children: Not on file    Years of education: Not on file    Highest education level: Not on file   Occupational History    Not on file   Tobacco Use    Smoking status: Never    Smokeless tobacco: Never   Substance and Sexual Activity    Alcohol use: Not Currently    Drug use: No    Sexual activity: Yes     Partners: Male   Other Topics  Concern    Not on file   Social History Narrative    Not on file     Social Determinants of Health     Financial Resource Strain: Not on file   Food Insecurity: Not on file   Transportation Needs: Not on file   Physical Activity: Not on file   Stress: Not on file   Social Connections: Not on file   Intimate Partner Violence: Not on file   Housing Stability: Not on file       Family History   Problem Relation Age of Onset    Diabetes Mother     Obesity Mother     Hyperlipidemia Mother     Uterine Cancer Maternal Grandmother      Family history reviewed and edited as appropriate    Medications and Allergies:     Outpatient Encounter Medications as of 7/27/2023   Medication Sig Dispense Refill    ADVAIR DISKUS 250-50 MCG/ACT inhaler Inhale 1 puff into the lungs 2 times daily      albuterol (PROAIR HFA/PROVENTIL HFA/VENTOLIN HFA) 108 (90 Base) MCG/ACT inhaler Inhale 1 puff into the lungs as needed      amphetamine-dextroamphetamine (ADDERALL XR) 10 MG 24 hr capsule Take 10 mg by mouth every morning      amphetamine-dextroamphetamine (ADDERALL) 5 MG tablet Take 5-10 mg by mouth daily as needed      escitalopram (LEXAPRO) 20 MG tablet Take 1 tablet by mouth daily      famotidine (PEPCID) 20 MG tablet Take 1 tablet (20 mg) by mouth 2 times daily 180 tablet 3    LORazepam (ATIVAN) 0.5 MG tablet Take 0.5 mg by mouth every 6 hours as needed      magnesium hydroxide (MOM) 2400 MG/10ML SUSP Take 5 mLs by mouth as needed for constipation      omeprazole (PRILOSEC) 40 MG DR capsule Take 1 capsule (40 mg) by mouth daily 90 capsule 3    ondansetron (ZOFRAN ODT) 4 MG ODT tab Take 1 tablet (4 mg) by mouth every 8 hours as needed for nausea 20 tablet 3    semaglutide (OZEMPIC, 0.25 OR 0.5 MG/DOSE,) 2 MG/1.5ML SOPN pen -Start Ozempic 0.25 mg weekly for 2 weeks then increase to 0.5 mg weekly for 1 month and then increase to 1.0 mg weekly thereafter (Patient not taking: Reported on 9/26/2022) 4.5 mL 0    Semaglutide, 1 MG/DOSE, 4 MG/3ML  SOPN Inject 1 mg Subcutaneous once a week To be used after finishing 0.5 mg weekly (Patient not taking: Reported on 9/26/2022) 9 mL 3    verapamil (CALAN) 40 MG tablet Take  one tab twice daily 60 tablet 9    ZOLMitriptan (ZOMIG) 2.5 MG tablet Take 1 tablet (2.5 mg) by mouth at onset of headache for migraine May repeat in 2 hours. Max 4 tablets/24 hours. 18 tablet 9     No facility-administered encounter medications on file as of 7/27/2023.        Allergies   Allergen Reactions    Codeine Anaphylaxis    Hydromorphone Nausea and Vomiting     Zofran DOES NOT HELP, only compounds problem, PER PT.         Review of systems:  A full 10 point review of systems was obtained and was negative except for the pertinent positives and negatives stated within the HPI.    Objective Findings:   Physical Exam:    Constitutional: There were no vitals taken for this visit.  General: Alert, cooperative, no distress, well-appearing  Head: Atraumatic, normocephalic, no obvious abnormalities   Eyes: Sclera anicteric, no obvious conjunctival hemorrhage   Nose: Nares normal, no obvious malformation, no obvious rhinorrhea   Respiratory: Resting comfortably, no apparent distress, no cough.   Gastrointestinal: Soft, non-distended  Skin: No jaundice, no obvious rash  Neurologic: AAOx3, no obvious neurologic abnormality  Psychiatric: Normal Affect, appropriate mood  Extremities: No obvious edema, no obvious malformation     Labs, Radiology, Pathology     Lab Results   Component Value Date    WBC 6.8 04/26/2022    WBC 7.8 11/27/2018    WBC 7.7 09/02/2017    HGB 12.9 (L) 04/26/2022    HGB 13.6 11/27/2018    HGB 15.0 09/02/2017     04/26/2022     11/27/2018     09/02/2017    TRIG 113 06/29/2022    ALT 78 (H) 04/26/2022    ALT 78 (H) 11/27/2018    ALT 95 (H) 12/11/2016    AST 43 04/26/2022    AST 33 11/27/2018    AST 55 (H) 12/11/2016     04/26/2022     11/27/2018     09/02/2017    BUN 17 04/26/2022    BUN 18  11/27/2018    BUN 19 09/02/2017    CO2 25 04/26/2022    CO2 25 11/27/2018    CO2 23 09/02/2017    TSH 0.79 12/11/2016    INR 1.04 12/11/2016        Liver Function Studies -   Recent Labs   Lab Test 04/26/22  1111   PROTTOTAL 7.7   ALBUMIN 3.6   BILITOTAL 0.3   ALKPHOS 158*   AST 43   ALT 78*          Patient Active Problem List    Diagnosis Date Noted    Esophageal reflux 03/24/2022     Priority: Medium    Depressive disorder 03/24/2022     Priority: Medium    Anxiety 03/24/2022     Priority: Medium    DIEGO (obstructive sleep apnea)- severe (AHI 35) 03/24/2022     Priority: Medium     3/23/2022 [place] [facility] Sleep Study (-) - AHI 35.3, RDI 38.3, Supine AHI 69.1, Lateral AHI 5, REM AHI 1.7, Low O2 78.0%, Time Spent ?88% 20.0 minutes / Time Spent ?89% 28.2 minutes.      Class 1 obesity due to excess calories with serious comorbidity and body mass index (BMI) of 32.0 to 32.9 in adult 03/24/2022     Priority: Low      Assessment and Plan   Assessment/Plan:    Kirby Patel is a 38 year old male with significant past medical history pertinent for anxiety, depression, DIEGO, migraine headaches who is presenting as a follow up patient however as a new patient to myself with a chief complaint of dyspepsia/GERD.    # Bloating - Improved  # Irregular bowel habits - Improved  Chronic history of irregular bowel habits, with multiple negative endoscopic evaluations.  Now with reports of 2-3 stools daily consistent with [place] stool scale type IV.  No alarm signs other than bright red blood per rectum which has been evaluated by colorectal surgery as per below.    - Increase daily fiber supplementation to 2 tablespoons daily this can be increased to 3 tablespoons daily if needed    # BRBPR  Colonoscopy in 2021 that was unremarkable, suspected to have outlet bleeding.  Patient was seen by colorectal surgery team 7/12/2023 with findings notable for internal hemorrhoids.  Options of hemorrhoid banding versus conservative  management were discussed.  Kirby has since increased his daily fiber supplementation and water intake with an improvement in symptoms.    - Follow-up with colorectal surgery if you wish to proceed with hemorrhoidal banding  - Increase daily fiber supplementation to 2 tablespoons daily this can be increased to 3 tablespoons daily if needed    # GERD  # Dyspepsia   1/2020 EGD with findings notable for reactive gastropathy and peptic duodenitis, without erosive GERD.  Biopsies negative for H. pylori, intestinal metaplasia or dysplasia. Duodenal biopsies with foveolar metaplasia consistent with peptic duodenitis.  Overall symptoms have improved with focus on healthy dietary habits and subsequent intentional weight loss.  Now with concern for indigestion/dyspepsia occurring in the late evening hours.  Denies breakthrough symptoms of heartburn/regurgitation and symptoms of dysphagia.    - Continue Omeprazole 40 mg once daily, best taken on an empty stomach at least 30 - 60 minutes prior to the first meal of the day   - Reflux friendly lifestyle modifications as directed within AVS  - Okay to use Famotidine 20 mg twice daily prior to meals  - Can trial FDgard two capsules prior to the last meal of the day, if no improvement increase to two capsules twice daily prior to meals    #Elevated LFT  Chronic mild elevation in ALT <2x ULN (7+ years) now with new mild elevation in alkaline phosphatase. Ultrasound of the abdomen 2019 with normal echotexture of the liver without focal abnormality.    - Chronic liver disease panel including viral hepatitis panel and autoimmune evaluation.  Pending results would then consider consultation to hepatology.        Follow up plan:   Return to clinic 6 months and as needed.    The risks and benefits of my recommendations, as well as other treatment options were discussed with the patient and any available family today. All questions were answered.     Follow up: As planned above. Today, I  personally spent 27 minutes in direct face to face time with the patient, of which greater than 50% of the time was spent in patient education and counseling as described above. Approximately 12 minutes were spent on indirect care associated with the patient's consultation including but not limited to review of: patient medical records to date, clinic visits, hospital records, lab results, imaging studies, procedural documentation, and coordinating care with other providers. The findings from this review are summarized in the above note. All of the above accounted for a cumulative time of 39 minutes and was performed on the date of service.     The patient verbalized understanding of the plan and was appreciative for the time spent and information provided during the office visit.           Sol Vo PA-C  Division of Gastroenterology, Hepatology, and Nutrition  [facility]       Documentation assisted by voice recognition and documentation system.

## 2023-07-27 NOTE — PATIENT INSTRUCTIONS
It was a pleasure taking care of you today.  I've included a brief summary of our discussion and care plan from today's visit below.  Please review this information with your primary care provider.  _______________________________________________________________________    My recommendations are summarized as follows:    - Labs today   - Continue Omeprazole 40 mg once daily, best taken on an empty stomach at least 30 - 60 minutes prior to the first meal of the day   - Okay to use Famotidine 20 mg twice daily as you are   - Increase daily fiber supplementation to 2 tablespoons daily this can be increased to 3 tablespoons daily if needed  - Can trial FDgard two capsules prior to the last meal of the day, if no improvement increase to two capsules twice daily prior to meals    FDgard:  FDgard is a combination of [place] Oil/L - Menthol (primary active ingredient in peppermint oil)    Studies have shown benefit in patients with functional dyspepsia both postprandial distress syndrome and epigastric pain syndrome subtypes. Patients were found to have a decrease in abdominal pain/discomfort, pressure, heaviness, and fullness with its use.    Please take two capsules in the morning and evening 30-60 minutes prior to meals. For maximum effect continue medication for minimum of 4 weeks.      Fiber Recommendations:  -- Recommend starting supplementation with a powdered soluble fiber. When used on a daily basis, this can help regulate the consistency of your stools. Metamucil (psyllium) and Citrucel are preferred examples. You can start with 1-2 teaspoons per day, with goal to gradually increase to 1 tablespoon daily. You can increase up to 1 tablespoon three times daily if needed. It is important to stay well-hydrated with use of fiber supplementation and to make sure that the fiber powder is well mixed with water as directed on the label. You may experience some bloating with initiation of fiber, which will improve over the  first few weeks. A good trial to evaluate the effect is 3-6 months. Of note, many of the fiber products contain artificial sweeteners, which can cause bloating, gas, and diarrhea in those who may be sensitive to artificial sweeteners. If this is the case, recommend trying Metamucil Premium Blend (with Stevia), Metamucil 4-in-1 without Added Sweeteners, or Bellway (sweetened with Monk fruit extract).    Gastroesophageal Reflux Disease (GERD) Lifestyle Modifications:   If taking acid suppression therapy (PPI ie Pantoprazole, Lansoprazole, Omeprazole, Esomeprazole, Rabeprazole, Dexlansoprazole) it should be taken 30 - 60 minutes prior to meals on an empty stomach to have maximum effect  Avoid triggers for reflux such as coffee, chocolate, carbonated beverages, spicy foods, acidic foods (tomato based/citrus and foods with high fat content   Abstinence from alcohol and cessation of all tobacco products is recommended   Studies have shown that weight loss, exercise and maintaining a healthy BMI significantly reduce GERD symptoms   Remain upright while eating and immediately after meals  Do not eat or drink at least 3 hours prior to laying down supine/laying down for bed   Avoid late night/middle of the night snacking    Consider obtaining a wedge pillow or elevating the head end of the bed while sleeping   Avoid sleeping right side down as this can place the lower part of the esophagus/lower esophageal sphincter in a dependent position that favors reflux   Attempting to eat smaller more frequent meals may improve symptoms         To schedule endoscopic procedures you may call: [phone]  To schedule radiology (imaging) tests you may call: [phone]  To schedule an ENT appointment you may call: [phone]    Please call my nurse Lynette ([phone])Nathaniel ([phone]) with any questions or concerns.      Return to GI Clinic in 6 months to review your progress.     _______________________________________________________________________    Who do I call with any questions after my visit?  Please be in touch if there are any further questions that arise following today's visit.  There are multiple ways to contact your gastroenterology care team.      During business hours, you may reach a Gastroenterology nurse at [phone] and choose option 3.       To schedule or reschedule an appointment, please call [phone].     You can always send a secure message through [employer].  [employer] messages are answered by your nurse or doctor typically within 24 hours.  Please allow extra time on weekends and holidays.      For urgent/emergent questions after business hours, you may reach the on-call GI Fellow by contacting the [facility] at [phone].     How will I get the results of any tests ordered?    You will receive all of your results.  If you have signed up for [facility] [employer], any tests ordered at your visit will be available to you after your physician reviews them.  Typically this takes 1-2 weeks.  If there are urgent results that require a change in your care plan, your physician or nurse will call you to discuss the next steps.      What is [employer]?  [employer] is a secure way for you to access all of your healthcare records from the [facility].  It is a web based computer program, so you can sign on to it from any location.  It also allows you to send secure messages to your care team.  I recommend signing up for [employer] access if you have not already done so and are comfortable with using a computer.      How to I schedule a follow-up visit?  If you did not schedule a follow-up visit today, please call [phone] to schedule a follow-up office visit.      If you feel you received exceptional care and are interested in supporting the clinical and research goals of Sol Vo PA-C or the Division of Gastroenterology, Hepatology, and Nutrition  please contact [email or web address] from the [facility] to discuss opportunities to donate.    Sincerely,    Sol Vo PA-C  Division of Gastroenterology, Hepatology, and Nutrition  [facility]

## 2023-07-28 LAB
ANA SER QL IF: NEGATIVE
MITOCHONDRIA M2 IGG SER-ACNC: <1 U/ML
SMA IGG SER-ACNC: 7 UNITS

## 2023-07-29 LAB
ALP BONE SERPL-CCNC: 92 U/L
ALP LIVER SERPL-CCNC: 82 U/L
ALP OTHER SERPL-CCNC: 0 U/L
ALP SERPL-CCNC: 174 U/L

## 2023-07-31 LAB
A1AT SERPL-MCNC: 133 MG/DL (ref 90–200)
CERULOPLASMIN SERPL-MCNC: 25 MG/DL (ref 20–60)

## 2023-08-06 DIAGNOSIS — R79.89 ELEVATED LFTS: Primary | ICD-10-CM

## 2023-08-09 DIAGNOSIS — G47.33 OBSTRUCTIVE SLEEP APNEA (ADULT) (PEDIATRIC): Primary | ICD-10-CM

## 2023-10-10 DIAGNOSIS — G43.709 CHRONIC MIGRAINE WITHOUT AURA WITHOUT STATUS MIGRAINOSUS, NOT INTRACTABLE: ICD-10-CM

## 2023-10-10 RX ORDER — VERAPAMIL HYDROCHLORIDE 40 MG/1
TABLET ORAL
Qty: 180 TABLET | Refills: 1 | Status: SHIPPED | OUTPATIENT
Start: 2023-10-10 | End: 2024-07-31

## 2023-10-10 NOTE — TELEPHONE ENCOUNTER
Rx Authorization:  Requested Medication/ Dose VERAPAMIL 40 MG TABLET   Date last refill ordered: 12/29/22  Quantity ordered: 60  # refills: 0  Date of last clinic visit with ordering provider: 8/30/22  Date of next clinic visit with ordering provider: No future appointment scheduled  All pertinent protocol data (lab date/result):   Include pertinent information from patients message:

## 2024-01-26 ENCOUNTER — DOCUMENTATION ONLY (OUTPATIENT)
Dept: SLEEP MEDICINE | Facility: CLINIC | Age: 39
End: 2024-01-26
Payer: COMMERCIAL

## 2024-01-26 NOTE — PROGRESS NOTES
DATE: 1/26/24  LOCATION OF MASK FITTING: [place]  REASON FOR MASK FITTING: MASK DISCOMFORT  DISCUSSED/VIEWED THE FOLLOWING MASKS: LACY FFM    MASK AND SIZE SELECTED: LACY FFM XS  MASK CLARIFICATION NEEDED: N    DOES PATIENT WEAR A MEDICAL DEVICE THAT WILL BE AFFECTED BY THE RESPIRONICS OR RESMED MAGNETIC MASK CONTRAINDICATION (IF YES, SELECT A MASK THAT DOES NOT HAVE MAGNETS)? N

## 2024-04-13 DIAGNOSIS — G43.709 CHRONIC MIGRAINE WITHOUT AURA WITHOUT STATUS MIGRAINOSUS, NOT INTRACTABLE: ICD-10-CM

## 2024-04-13 DIAGNOSIS — K21.9 GASTROESOPHAGEAL REFLUX DISEASE WITHOUT ESOPHAGITIS: ICD-10-CM

## 2024-04-16 ENCOUNTER — VIRTUAL VISIT (OUTPATIENT)
Dept: GASTROENTEROLOGY | Facility: CLINIC | Age: 39
End: 2024-04-16
Attending: PHYSICIAN ASSISTANT
Payer: COMMERCIAL

## 2024-04-16 VITALS — BODY MASS INDEX: 31.07 KG/M2 | HEIGHT: 64 IN | WEIGHT: 182 LBS

## 2024-04-16 DIAGNOSIS — K21.9 GASTROESOPHAGEAL REFLUX DISEASE WITHOUT ESOPHAGITIS: ICD-10-CM

## 2024-04-16 DIAGNOSIS — R79.89 ELEVATED LFTS: Primary | ICD-10-CM

## 2024-04-16 DIAGNOSIS — K62.5 BRBPR (BRIGHT RED BLOOD PER RECTUM): ICD-10-CM

## 2024-04-16 PROCEDURE — 99215 OFFICE O/P EST HI 40 MIN: CPT | Mod: 95 | Performed by: PHYSICIAN ASSISTANT

## 2024-04-16 ASSESSMENT — PAIN SCALES - GENERAL: PAINLEVEL: NO PAIN (0)

## 2024-04-16 NOTE — NURSING NOTE
Is the patient currently in the state of [place]? YES    Visit mode:VIDEO    If the visit is dropped, the patient can be reconnected by: VIDEO VISIT: Text to cell phone:   Telephone Information:   Mobile [phone]       Will anyone else be joining the visit? NO  (If patient encounters technical issues they should call [phone]    How would you like to obtain your AVS? MyChart    Are changes needed to the allergy or medication list? Pt stated no med changes    Are refills needed on medications prescribed by this physician? NO    Reason for visit: Video Visit (Recheck)    Meagan SINGH

## 2024-04-16 NOTE — PROGRESS NOTES
Virtual Visit Details    Type of service:  Video Visit     Originating Location (pt. Location): Home    Distant Location (provider location):  Off-site  Platform used for Video Visit: [facility]    Gastroenterology Visit for: Kirby Patel 1985   MRN: [id number]     Reason for Visit:  chief complaint    Referred by: Self  / No address on file  Patient Care Team:  Service, [facility] as PCP - General (Clinic)  Spencer Barraza MD as MD (Gastroenterology)  Branden Melendrez MD as Referring Physician (Pediatrics)  Roderick Hargrove MD as MD (Neurology)  Rico Burciaga PA-C as Physician Assistant (Physician Assistant)  Patel Smith MD as MD (Cardiology)  Gricel Childers APRN CNP as Nurse Practitioner (Neurology)  Ivana Muñoz PA-C (Inactive) as Physician Assistant (Gastroenterology)  Caty Palomo RD as Registered Dietitian (Dietitian, Registered)  Bloch, Lauren Turner, RPH as Pharmacist (Pharmacist)  Bloch, Lauren Turner, RPH as Assigned MTM Pharmacist  Bindu Matias PA-C as Physician Assistant (Gastroenterology)  Nisha Hartmann APRN CNP as Nurse Practitioner (Colon & Rectal)  Bindu Matias PA-C as Assigned Cancer Care Provider  Nisha Hartmann APRN CNP as Assigned Surgical Provider  Sol Vo PA-C as Assigned Gastroenterology Provider    History of Present Illness:   Kirby Patel is a 38 year old male with significant past medical history pertinent for anxiety, depression, DIEGO, migraine headaches who is presenting as a follow up patient however as a new patient to myself with a chief complaint of reflux and BRBPR.    Interval History April 16, 2024:    Reflux - Omeprazole 40 mg once daily and Famotidine 20 mg twice daily without break through symptoms of heartburn or regurgitation. Will miss doses of the Famotidine. Noting if he avoid triggers food and consume enough water the reflux would  be well controlled without the Famotidine.      Bowel Patterns - Currently he is stool 3-4 times per day consistent with [place] Stool Scale Type 1-3. 3 weeks of BRBPR which resolved this past Saturday. Had cessation this Saturday. Now using Preparation H once daily x3 days with symptomatic improvement. Currently taking two teaspoons twice daily of Benefiber. No additional laxative use     Weight - 205 lbs --> 182 lbs. Newer exercise equipment at home     Denies unintentional weight loss, nausea, emesis, dysphagia, odynophagia, heartburn, regurgitation, abdominal pain, diarrhea, constipation (< 3 stools per week) and melena.     -----------------------------------------------------------------------------------------------------------------------------------------------------------------------------------------------------------------------------------    Interval History July 27, 2023:    Current acid suppressive medication includes Omeprazole in the morning on an empty stomach and Famotidine 20 mg twice daily prior to meals. Continues to have indigestion later in the evening/prior to bed is relieved with the use of TUMs. Denies heartburn/regurgitation.      Currently Kirby is having bowel movement 2-3 times daily consistent with [place] Stool Scale Type 4.  Since his last office visit he was evaluated by the colorectal team and advised to increase daily fiber supplementation/daily water intake.  With increase in fiber has noted less frequent red blood per rectum. With more agressive wiping has noted red blood on the toilet paper.     Denies weight loss, nausea, emesis, dysphagia, odynophagia, heartburn, regurgitation, abdominal pain, diarrhea, constipation (< 3 stools per week), melena and hematochezia.     No family history or GI related malignancy (esophageal, gastric, pancreatic, liver or colon) or family history of IBD/celiac disease.     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      ---------------------------------------------------------------------------------------------------------------------------------------------------------  4/12/2023 SOURAV Matias PA-C:     37 year old male with PMH of anxiety, depression, DIEGO, migraine headaches, presenting to GI clinic for follow-up.     Kirby has previously established in our clinic with Dr. Ofelia Milligan and Ivana Muñoz PA-C. Briefly, he was initially evaluated by a gastroenterologist in [place], where he was diagnosed with irritable bowel syndrome and GERD. He was later seen at [facility] between 2015 and 2016, at which time he had an EGD and colonoscopy for blood in stools. Per patient, this was unremarkable. At time of consultations at our clinic, he reported a variable stool pattern with loose stools alternating with constipation. He underwent EGD 1/23/2020 with normal esophagus, stomach with erythema and a few erosions in the gastric antrum, with biopsies suggestive of reactive gastropathy, without evidence of H pylori. Duodenal biopsies consistent with peptic duodenitis. Patient underwent colonoscopy 1/18/2021 without source of symptoms. He had previously been given a course of Rifaximin for symptoms, with initial course offering symptom improvement.     At most recent follow-up January 2022, he noted bloating and constipation, followed by diarrhea. He was recommended to start magnesium 400 mg daily. In regard to GERD, he was recommended to continue omeprazole.     Today, Kirby notes he has lost weight; he notes that around the Fall of  Last year, he was formaly diagnosed and placed on ADHD medications and started to lose weight following this. His partner started a weight loss journey around that time, and this has changed Kirby's eating habits as well, noting decreased intake of take-out (previously daily), and decreased volume of intake. He estimates with these changes, he has lost 25 pounds  intentionally. He notes that this has helped the heartburn and reflux significantly. He uses famotidine daily to as needed, which helps. Remains on Prilosec.     However, he continues to have periods of time where he notes he has BRBPR with bowel movements. He is trying to strain less and spend less time on the toilet. He notes there was a time where he was taking fiber, and instead of helping with the stools, he notes he had increased bloating and constipated. Currently, he notes he has ongoing variable bowel habits. He notes the bloating has been improved by cutting out soda. Coffee can augment bloating as well. He uses Gas-X if needed. He is having bowel movements ranging from once daily, which then is followed by what he describes as a release, with 5-6 clustered bowel movements that day. He estimates that this has happened 3-4 times in the last few months. Stools can often be hard and dry, associated with straining and a sense of incomplete evacuation. He is not currently using magnesium.        Esophageal Questionnaire(s)    BEDQ Questionnaire       No data to display                   No data to display                Eckardt Questionnaire       No data to display                Promis 10 Questionnaire       No data to display                    STUDIES & PROCEDURES:    EGD:     1/2020 [facility]     FINAL DIAGNOSIS:   A. DUODENAL BIOPSY:   - Duodenal mucosa with foveolar metaplasia consistent with peptic   duodenitis     B. GASTRIC BIOPSY:   - Mild chronic gastritis   - Features of reactive gastropathy   - No H. pylori like organisms identified on routine staining   - Negative for intestinal metaplasia or dysplasia     Colonoscopy:    1/8/2021  Findings:       The terminal ileum appeared normal. Biopsies were taken with a cold        forceps for histology.        The entire examined colon appeared normal.        Biopsies for histology were taken with a cold forceps from the right        colon, left colon and  rectum for evaluation of microscopic colitis.     Impression:          - The examined portion of the ileum was normal. Biopsied.                        - The entire examined colon is normal.                        - Biopsies were taken with a cold forceps from the right                        colon, left colon and rectum for evaluation of                        microscopic colitis.                        - Retroflexion was not performed as the rectal vault was                        small and patient would not tolerate retroflexion.     FINAL DIAGNOSIS:   A. TERMINAL ILEUM, BIOPSY:   - Ileal mucosa with no significant histologic abnormality   - No evidence of inflammation     B. RIGHT COLON, BIOPSY:   - Colonic mucosa with no significant histologic abnormality   - No evidence of inflammation     C. LEFT COLON, BIOPSY:   - Colonic mucosa with no significant histologic abnormality   - No evidence of inflammation     D. RECTUM, BIOPSY:   - Rectal mucosa with no significant histologic abnormality   - No evidence of inflammation      EndoFLIP directed at the UES or LES (8cm (EF-325) balloon length or 16cm (EF-322) balloon length):   Date:  8cm balloon  Balloon inflation Balloon pressure CSA (mm^2) DI (mm^2/mmHg) Dmin (mm) Compliance   20 (ladmark ID)        30        40        50           16cm balloon  Balloon inflation Balloon pressure CSA (mm^2) DI (mm^2/mmHg) Dmin (mm) Compliance   30 (ladmark ID)        40        50        60        70           High Resolution Manometry:    PH/Impedance:     Bravo:    CT:    Esophagram:    FL VSS:     GES:    U/S:     7/15/2019  FINDINGS:   The liver has a normal echotexture with no focal abnormality.  Visualized portions of the pancreas are normal. The spleen is normal  in size at 9.8 cm. The gallbladder is normal. Sonographic 's  sign is negative. There are no gallstones. Common duct measures 1 mm.  The aorta and IVC are normal. The right kidney measures 11.2 cm. The  left  kidney measures 10.9 cm. There is no hydronephrosis.                                                               IMPRESSION:      Normal abdominal ultrasound.       XRAY:    Other:       Prior medical records were reviewed including, but not limited to, notes from referring providers, lab work, radiographic tests, and other diagnostic tests. Pertinent results were summarized above.     History     Past Medical History:   Diagnosis Date     Anxiety      Depressive disorder      Intractable vomiting with nausea, unspecified vomiting type 11/13/2016     Kidney stone on right side 11/13/2016     DIEGO (obstructive sleep apnea)- severe (AHI 35) 3/24/2022       Past Surgical History:   Procedure Laterality Date     COLONOSCOPY       COLONOSCOPY N/A 1/18/2021    Procedure: COLONOSCOPY, WITH  BIOPSY;  Surgeon: Spencer Barraza MD;  [place] [facility] [place]       Social History     Socioeconomic History     Marital status: Single     Spouse name: Not on file     Number of children: Not on file     Years of education: Not on file     Highest education level: Not on file   Occupational History     Not on file   Tobacco Use     Smoking status: Never     Smokeless tobacco: Never   Substance and Sexual Activity     Alcohol use: Not Currently     Drug use: No     Sexual activity: Yes     Partners: Male   Other Topics Concern     Not on file   Social History Narrative     Not on file     Social Determinants of Health     Financial Resource Strain: Not on file   Food Insecurity: Not on file   Transportation Needs: Not on file   Physical Activity: Not on file   Stress: Not on file   Social Connections: Not on file   Interpersonal Safety: Not on file   Housing Stability: Not on file       Family History   Problem Relation Age of Onset     Diabetes Mother      Obesity Mother      Hyperlipidemia Mother      Uterine Cancer Maternal Grandmother      Family history reviewed and edited as appropriate    Medications and Allergies:      Outpatient Encounter Medications as of 4/16/2024   Medication Sig Dispense Refill     ADVAIR DISKUS 250-50 MCG/ACT inhaler Inhale 1 puff into the lungs 2 times daily       albuterol (PROAIR HFA/PROVENTIL HFA/VENTOLIN HFA) 108 (90 Base) MCG/ACT inhaler Inhale 1 puff into the lungs as needed       amphetamine-dextroamphetamine (ADDERALL XR) 10 MG 24 hr capsule Take 10 mg by mouth every morning       amphetamine-dextroamphetamine (ADDERALL) 5 MG tablet Take 5-10 mg by mouth daily as needed       escitalopram (LEXAPRO) 20 MG tablet Take 1 tablet by mouth daily       famotidine (PEPCID) 20 MG tablet Take 1 tablet (20 mg) by mouth 2 times daily 180 tablet 3     LORazepam (ATIVAN) 0.5 MG tablet Take 0.5 mg by mouth every 6 hours as needed       magnesium hydroxide (MOM) 2400 MG/10ML SUSP Take 5 mLs by mouth as needed for constipation       omeprazole (PRILOSEC) 40 MG DR capsule Take 1 capsule (40 mg) by mouth daily 90 capsule 3     ondansetron (ZOFRAN ODT) 4 MG ODT tab Take 1 tablet (4 mg) by mouth every 8 hours as needed for nausea 20 tablet 3     semaglutide (OZEMPIC, 0.25 OR 0.5 MG/DOSE,) 2 MG/1.5ML SOPN pen -Start Ozempic 0.25 mg weekly for 2 weeks then increase to 0.5 mg weekly for 1 month and then increase to 1.0 mg weekly thereafter (Patient not taking: Reported on 9/26/2022) 4.5 mL 0     Semaglutide, 1 MG/DOSE, 4 MG/3ML SOPN Inject 1 mg Subcutaneous once a week To be used after finishing 0.5 mg weekly (Patient not taking: Reported on 9/26/2022) 9 mL 3     verapamil (CALAN) 40 MG tablet TAKE 1 TABLET BY MOUTH TWICE A  tablet 1     ZOLMitriptan (ZOMIG) 2.5 MG tablet Take 1 tablet (2.5 mg) by mouth at onset of headache for migraine May repeat in 2 hours. Max 4 tablets/24 hours. 18 tablet 9     No facility-administered encounter medications on file as of 4/16/2024.        Allergies   Allergen Reactions     Codeine Anaphylaxis     Hydromorphone Nausea and Vomiting     Zofran DOES NOT HELP, only compounds  problem, PER PT.         Review of systems:  A full 10 point review of systems was obtained and was negative except for the pertinent positives and negatives stated within the HPI.    Objective Findings:   Physical Exam:    Constitutional: There were no vitals taken for this visit.  General: Alert, cooperative, no distress, well-appearing  Head: Atraumatic, normocephalic, no obvious abnormalities   Eyes: Sclera anicteric, no obvious conjunctival hemorrhage   Nose: Nares normal, no obvious malformation, no obvious rhinorrhea   Respiratory: Resting comfortably, no apparent distress, no cough.   Skin: No jaundice, no obvious rash  Neurologic: AAOx3, no obvious neurologic abnormality  Psychiatric: Normal Affect, appropriate mood  Extremities: No obvious edema, no obvious malformation     Labs, Radiology, Pathology     Lab Results   Component Value Date    WBC 6.8 04/26/2022    WBC 7.8 11/27/2018    WBC 7.7 09/02/2017    HGB 12.9 (L) 04/26/2022    HGB 13.6 11/27/2018    HGB 15.0 09/02/2017     04/26/2022     11/27/2018     09/02/2017    TRIG 113 06/29/2022    ALT 78 (H) 04/26/2022    ALT 78 (H) 11/27/2018    ALT 95 (H) 12/11/2016    AST 43 04/26/2022    AST 33 11/27/2018    AST 55 (H) 12/11/2016     04/26/2022     11/27/2018     09/02/2017    BUN 17 04/26/2022    BUN 18 11/27/2018    BUN 19 09/02/2017    CO2 25 04/26/2022    CO2 25 11/27/2018    CO2 23 09/02/2017    TSH 0.79 12/11/2016    INR 1.04 12/11/2016        Liver Function Studies -   Recent Labs   Lab Test 04/26/22  1111   PROTTOTAL 7.7   ALBUMIN 3.6   BILITOTAL 0.3   ALKPHOS 158*   AST 43   ALT 78*          Patient Active Problem List    Diagnosis Date Noted     Esophageal reflux 03/24/2022     Priority: Medium     Depressive disorder 03/24/2022     Priority: Medium     Anxiety 03/24/2022     Priority: Medium     DIEGO (obstructive sleep apnea)- severe (AHI 35) 03/24/2022     Priority: Medium     3/23/2022 [facility]  Sleep Study (-) - AHI 35.3, RDI 38.3, Supine AHI 69.1, Lateral AHI 5, REM AHI 1.7, Low O2 78.0%, Time Spent ?88% 20.0 minutes / Time Spent ?89% 28.2 minutes.       Class 1 obesity due to excess calories with serious comorbidity and body mass index (BMI) of 32.0 to 32.9 in adult 03/24/2022     Priority: Low      Assessment and Plan   Assessment/Plan:    Kirby Patel is a 38 year old male with significant past medical history pertinent for anxiety, depression, DIEGO, migraine headaches who is presenting as a follow up patient however as a new patient to myself with a chief complaint of reflux and BRBPR.    # BRBPR   Colonoscopy in 2021 that was unremarkable, suspected to have outlet bleeding.  Patient was seen by colorectal surgery team 7/12/2023 with findings notable for internal hemorrhoids.  Options of hemorrhoid banding versus conservative management were discussed.  Despite increase indaily fiber supplementation and water intake Kirby continued to be symptomatic.    - Return to colorectal surgery team for hemorrhoidal banding  - It is okay to use Preparation H 1 pea-sized placed into the rectum 1-2 times per day.  However please do not use for greater than 7 days as this can result in perianal irritation  - Start MiraLAX 17g (1 capful) daily this can be increased to twice daily dosing if needed  - Increase daily fiber supplementation to 1 tablespoon in the morning and 1 tablespoon in the evening.  This can be increased to 3 tablespoons daily as needed    # GERD  1/2020 EGD with findings notable for reactive gastropathy and peptic duodenitis, without erosive esophagitis.  Biopsies negative for H. pylori, intestinal metaplasia or dysplasia. Duodenal biopsies with foveolar metaplasia consistent with peptic duodenitis.      Overall symptoms have improved with current acid suppressive regimen and focus on healthy dietary habits and intentional weight loss. Denies breakthrough symptoms of heartburn/regurgitation and  symptoms of dysphagia.    - Deescalate Omeprazole to 20 mg once daily, best taken on an empty stomach at least 30 - 60 minutes prior to the first meal of the day   - Continue Famotidine 20 mg twice daily once in the morning and once in the evening. After 2-4 weeks use of Omeprazole 20 mg once daily deescalate to Famotidine 20 mg once in the evening.   - Reflux friendly lifestyle modifications as directed within AVS    #Elevated ALP - Bone Origin  #Elevated ALT  Chronic mild elevation in ALT <2x ULN (7+ years) with new mild elevation in alkaline phosphatase <2x ULN. Fractionated ALP was of bone origin. CLD panel negative. Ultrasound of the abdomen 2019 with normal echotexture of the liver without focal abnormality.    - Repeat LFT   - Follow-up with your primary care provider form ALP of bone origin        Follow up plan:   Return to clinic 6 months and as needed.    The risks and benefits of my recommendations, as well as other treatment options were discussed with the patient and any available family today. All questions were answered.     o Follow up: As planned above. Today, I personally spent 31 minutes in direct face to face time with the patient, of which greater than 50% of the time was spent in patient education and counseling as described above. Approximately 6 minutes were spent on indirect care associated with the patient's consultation including but not limited to review of: patient medical records to date, clinic visits, hospital records, lab results, imaging studies, procedural documentation, and coordinating care with other providers. The findings from this review are summarized in the above note. All of the above accounted for a cumulative time of 43 minutes and was performed on the date of service.     The patient verbalized understanding of the plan and was appreciative for the time spent and information provided during the office visit.           Sol Vo PA-C  Division of Gastroenterology,  Hepatology, and Nutrition  [facility]       Documentation assisted by voice recognition and documentation system.

## 2024-04-16 NOTE — PATIENT INSTRUCTIONS
It was a pleasure taking care of you today.  I've included a brief summary of our discussion and care plan from today's visit below.  Please review this information with your primary care provider.  _______________________________________________________________________    My recommendations are summarized as follows:    Reflux:   - Deescalate Omeprazole to 20 mg once daily, best taken on an empty stomach at least 30 - 60 minutes prior to the first meal of the day   - Continue Famotidine 20 mg twice daily once in the morning and once in the evening. After 2-4 weeks use of Omeprazole 20mg once daily deescalate the Famotidine 20mg to once in the evening.   - Reflux friendly lifestyle modifications as directed within AVS    Bowel Concerns:  - Return to colorectal surgery team for hemorrhoidal banding  - It is okay to use Preparation H 1 pea-sized placed into the rectum 1-2 times per day.  However please do not use for greater than 7 days as this can result in perianal irritation  - Start MiraLAX 17g (1 capful) daily this can be increased to twice daily dosing if needed  - Increase daily fiber supplementation to 1 tablespoon in the morning and 1 tablespoon in the evening.  This can be increased to 3 tablespoons daily as needed    Fiber Recommendations:  -- Recommend starting supplementation with a powdered soluble fiber. When used on a daily basis, this can help regulate the consistency of your stools. Metamucil (psyllium) and Citrucel are preferred examples. You can start with 1-2 teaspoons per day, with goal to gradually increase to 1 tablespoon daily. You can increase up to 1 tablespoon three times daily if needed. It is important to stay well-hydrated with use of fiber supplementation and to make sure that the fiber powder is well mixed with water as directed on the label. You may experience some bloating with initiation of fiber, which will improve over the first few weeks. A good trial to evaluate the effect is  3-6 months. Of note, many of the fiber products contain artificial sweeteners, which can cause bloating, gas, and diarrhea in those who may be sensitive to artificial sweeteners. If this is the case, recommend trying Metamucil Premium Blend (with Stevia), Metamucil 4-in-1 without Added Sweeteners, or Bellway (sweetened with Monk fruit extract).      To schedule endoscopic procedures you may call: [phone]  To schedule radiology (imaging) tests you may call: [phone]  To schedule an ENT appointment you may call: [phone]    Please call my nurse Lynette ([phone]), Nathaniel ([phone]) with any questions or concerns.      Return to GI Clinic in 6 months to review your progress.    _______________________________________________________________________    Who do I call with any questions after my visit?  Please be in touch if there are any further questions that arise following today's visit.  There are multiple ways to contact your gastroenterology care team.      During business hours, you may reach a Gastroenterology nurse at [phone] and choose option 3.       To schedule or reschedule an appointment, please call [phone].     You can always send a secure message through [employer].  [employer] messages are answered by your nurse or doctor typically within 24 hours.  Please allow extra time on weekends and holidays.      For urgent/emergent questions after business hours, you may reach the on-call GI Fellow by contacting the [facility]  at [phone].     How will I get the results of any tests ordered?    You will receive all of your results.  If you have signed up for [employer], any tests ordered at your visit will be available to you after your physician reviews them.  Typically this takes 1-2 weeks.  If there are urgent results that require a change in your care plan, your physician or nurse will call you to discuss the next steps.      What is [employer]?  [employer] is a secure  way for you to access all of your healthcare records from the [facility].  It is a web based computer program, so you can sign on to it from any location.  It also allows you to send secure messages to your care team.  I recommend signing up for [employer] access if you have not already done so and are comfortable with using a computer.      How to I schedule a follow-up visit?  If you did not schedule a follow-up visit today, please call [phone] to schedule a follow-up office visit.      If you feel you received exceptional care and are interested in supporting the clinical and research goals of Sol Vo PA-C or the Division of Gastroenterology, Hepatology, and Nutrition please contact [email or web address] from the [facility] to discuss opportunities to donate.    Sincerely,    Sol Vo PA-C  Division of Gastroenterology, Hepatology, and Nutrition  [facility]

## 2024-04-16 NOTE — LETTER
4/16/2024         RE: Kirby Patel  [address]  [facility] [address]        Dear Colleague,    Thank you for referring your patient, Kirby Patel, to the [facility] [place]. Please see a copy of my visit note below.      Gastroenterology Visit for: Kirby Patel 1985   MRN: [id number]     Reason for Visit:  chief complaint    Referred by: Self  / No address on file  Patient Care Team:  [facility] as PCP - General (Clinic)  Spencer Barraza MD as MD (Gastroenterology)  Branden Melendrez MD as Referring Physician (Pediatrics)  Roderick Hargrove MD as MD (Neurology)  Rico Burciaga PA-C as Physician Assistant (Physician Assistant)  Patel Smith MD as MD (Cardiology)  Gricel Childers APRN CNP as Nurse Practitioner (Neurology)  Ivana Muñoz PA-C (Inactive) as Physician Assistant (Gastroenterology)  Caty Palomo RD as Registered Dietitian (Dietitian, Registered)  Bloch, Lauren Turner, RPH as Pharmacist (Pharmacist)  Bloch, Lauren Turner, RPH as Assigned [facility] Pharmacist  Bindu Matias PA-C as Physician Assistant (Gastroenterology)  Nisha Hartmann APRN CNP as Nurse Practitioner (Colon & Rectal)  Bindu Matias PA-C as Assigned Cancer Care Provider  Nisha Hartmann APRN CNP as Assigned Surgical Provider  Sol Vo PA-C as Assigned Gastroenterology Provider    History of Present Illness:   Kirby Patel is a 38 year old male with significant past medical history pertinent for anxiety, depression, DIEGO, migraine headaches who is presenting as a follow up patient however as a new patient to myself with a chief complaint of reflux and BRBPR.    Interval History April 16, 2024:    Reflux - Omeprazole 40 mg once daily and Famotidine 20 mg twice daily without break through symptoms of heartburn or regurgitation. Will miss doses of the  Famotidine. Noting if he avoid triggers food and consume enough water the reflux would be well controlled without the Famotidine.      Bowel Patterns - Currently he is stool 3-4 times per day consistent with Pickton Stool Scale Type 1-3. 3 weeks of BRBPR which resolved this past Saturday. Had cessation this Saturday. Now using Preparation H once daily x3 days with symptomatic improvement. Currently taking two teaspoons twice daily of Benefiber. No additional laxative use     Weight - 205 lbs --> 182 lbs. Newer exercise equipment at home     Denies unintentional weight loss, nausea, emesis, dysphagia, odynophagia, heartburn, regurgitation, abdominal pain, diarrhea, constipation (< 3 stools per week) and melena.     -----------------------------------------------------------------------------------------------------------------------------------------------------------------------------------------------------------------------------------    Interval History July 27, 2023:    Current acid suppressive medication includes Omeprazole in the morning on an empty stomach and Famotidine 20 mg twice daily prior to meals. Continues to have indigestion later in the evening/prior to bed is relieved with the use of TUMs. Denies heartburn/regurgitation.      Currently Kirby is having bowel movement 2-3 times daily consistent with Pickton Stool Scale Type 4.  Since his last office visit he was evaluated by the colorectal team and advised to increase daily fiber supplementation/daily water intake.  With increase in fiber has noted less frequent red blood per rectum. With more agressive wiping has noted red blood on the toilet paper.     Denies weight loss, nausea, emesis, dysphagia, odynophagia, heartburn, regurgitation, abdominal pain, diarrhea, constipation (< 3 stools per week), melena and hematochezia.     No family history or GI related malignancy (esophageal, gastric, pancreatic, liver or colon) or family history of IBD/celiac  disease.     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      ---------------------------------------------------------------------------------------------------------------------------------------------------------  4/12/2023 SOURAV Matias PA-C:     37 year old male with PMH of anxiety, depression, DIEGO, migraine headaches, presenting to GI clinic for follow-up.     Kirby has previously established in our clinic with Dr. Ofelia Milligan and Ivana Muñoz PA-C. Briefly, he was initially evaluated by a gastroenterologist in [place], where he was diagnosed with irritable bowel syndrome and GERD. He was later seen at [facility] between 2015 and 2016, at which time he had an EGD and colonoscopy for blood in stools. Per patient, this was unremarkable. At time of consultations at our clinic, he reported a variable stool pattern with loose stools alternating with constipation. He underwent EGD 1/23/2020 with normal esophagus, stomach with erythema and a few erosions in the gastric antrum, with biopsies suggestive of reactive gastropathy, without evidence of H pylori. Duodenal biopsies consistent with peptic duodenitis. Patient underwent colonoscopy 1/18/2021 without source of symptoms. He had previously been given a course of Rifaximin for symptoms, with initial course offering symptom improvement.     At most recent follow-up January 2022, he noted bloating and constipation, followed by diarrhea. He was recommended to start magnesium 400 mg daily. In regard to GERD, he was recommended to continue omeprazole.     Today, Kirby notes he has lost weight; he notes that around the Fall of  Last year, he was formaly diagnosed and placed on ADHD medications and started to lose weight following this. His partner started a weight loss journey around that time, and this has changed Kirby's eating habits as well, noting decreased intake of take-out (previously  daily), and decreased volume of intake. He estimates with these changes, he has lost 25 pounds intentionally. He notes that this has helped the heartburn and reflux significantly. He uses famotidine daily to as needed, which helps. Remains on Prilosec.     However, he continues to have periods of time where he notes he has BRBPR with bowel movements. He is trying to strain less and spend less time on the toilet. He notes there was a time where he was taking fiber, and instead of helping with the stools, he notes he had increased bloating and constipated. Currently, he notes he has ongoing variable bowel habits. He notes the bloating has been improved by cutting out soda. Coffee can augment bloating as well. He uses Gas-X if needed. He is having bowel movements ranging from once daily, which then is followed by what he describes as a release, with 5-6 clustered bowel movements that day. He estimates that this has happened 3-4 times in the last few months. Stools can often be hard and dry, associated with straining and a sense of incomplete evacuation. He is not currently using magnesium.        Esophageal Questionnaire(s)    BEDQ Questionnaire       No data to display                   No data to display                Eckardt Questionnaire       No data to display                Promis 10 Questionnaire       No data to display                    STUDIES & PROCEDURES:    EGD:     1/2020 [facility]     FINAL DIAGNOSIS:   A. DUODENAL BIOPSY:   - Duodenal mucosa with foveolar metaplasia consistent with peptic   duodenitis     B. GASTRIC BIOPSY:   - Mild chronic gastritis   - Features of reactive gastropathy   - No H. pylori like organisms identified on routine staining   - Negative for intestinal metaplasia or dysplasia     Colonoscopy:    1/8/2021  Findings:       The terminal ileum appeared normal. Biopsies were taken with a cold        forceps for histology.        The entire examined colon appeared normal.         Biopsies for histology were taken with a cold forceps from the right        colon, left colon and rectum for evaluation of microscopic colitis.     Impression:          - The examined portion of the ileum was normal. Biopsied.                        - The entire examined colon is normal.                        - Biopsies were taken with a cold forceps from the right                        colon, left colon and rectum for evaluation of                        microscopic colitis.                        - Retroflexion was not performed as the rectal vault was                        small and patient would not tolerate retroflexion.     FINAL DIAGNOSIS:   A. TERMINAL ILEUM, BIOPSY:   - Ileal mucosa with no significant histologic abnormality   - No evidence of inflammation     B. RIGHT COLON, BIOPSY:   - Colonic mucosa with no significant histologic abnormality   - No evidence of inflammation     C. LEFT COLON, BIOPSY:   - Colonic mucosa with no significant histologic abnormality   - No evidence of inflammation     D. RECTUM, BIOPSY:   - Rectal mucosa with no significant histologic abnormality   - No evidence of inflammation      EndoFLIP directed at the UES or LES (8cm (EF-325) balloon length or 16cm (EF-322) balloon length):   Date:  8cm balloon  Balloon inflation Balloon pressure CSA (mm^2) DI (mm^2/mmHg) Dmin (mm) Compliance   20 (ladmark ID)        30        40        50           16cm balloon  Balloon inflation Balloon pressure CSA (mm^2) DI (mm^2/mmHg) Dmin (mm) Compliance   30 (ladmark ID)        40        50        60        70           High Resolution Manometry:    PH/Impedance:     Bravo:    CT:    Esophagram:    FL VSS:     GES:    U/S:     7/15/2019  FINDINGS:   The liver has a normal echotexture with no focal abnormality.  Visualized portions of the pancreas are normal. The spleen is normal  in size at 9.8 cm. The gallbladder is normal. Sonographic 's  sign is negative. There are no gallstones.  Common duct measures 1 mm.  The aorta and IVC are normal. The right kidney measures 11.2 cm. The  left kidney measures 10.9 cm. There is no hydronephrosis.                                                               IMPRESSION:      Normal abdominal ultrasound.       XRAY:    Other:       Prior medical records were reviewed including, but not limited to, notes from referring providers, lab work, radiographic tests, and other diagnostic tests. Pertinent results were summarized above.     History     Past Medical History:   Diagnosis Date    Anxiety     Depressive disorder     Intractable vomiting with nausea, unspecified vomiting type 11/13/2016    Kidney stone on right side 11/13/2016    DIEGO (obstructive sleep apnea)- severe (AHI 35) 3/24/2022       Past Surgical History:   Procedure Laterality Date    COLONOSCOPY      COLONOSCOPY N/A 1/18/2021    Procedure: COLONOSCOPY, WITH  BIOPSY;  Surgeon: Spencer Barraza MD;  Location: [facility] OR       Social History     Socioeconomic History    Marital status: Single     Spouse name: Not on file    Number of children: Not on file    Years of education: Not on file    Highest education level: Not on file   Occupational History    Not on file   Tobacco Use    Smoking status: Never    Smokeless tobacco: Never   Substance and Sexual Activity    Alcohol use: Not Currently    Drug use: No    Sexual activity: Yes     Partners: Male   Other Topics Concern    Not on file   Social History Narrative    Not on file     Social Determinants of Health     Financial Resource Strain: Not on file   Food Insecurity: Not on file   Transportation Needs: Not on file   Physical Activity: Not on file   Stress: Not on file   Social Connections: Not on file   Interpersonal Safety: Not on file   Housing Stability: Not on file       Family History   Problem Relation Age of Onset    Diabetes Mother     Obesity Mother     Hyperlipidemia Mother     Uterine Cancer Maternal Grandmother      Family  history reviewed and edited as appropriate    Medications and Allergies:     Outpatient Encounter Medications as of 4/16/2024   Medication Sig Dispense Refill    ADVAIR DISKUS 250-50 MCG/ACT inhaler Inhale 1 puff into the lungs 2 times daily      albuterol (PROAIR HFA/PROVENTIL HFA/VENTOLIN HFA) 108 (90 Base) MCG/ACT inhaler Inhale 1 puff into the lungs as needed      amphetamine-dextroamphetamine (ADDERALL XR) 10 MG 24 hr capsule Take 10 mg by mouth every morning      amphetamine-dextroamphetamine (ADDERALL) 5 MG tablet Take 5-10 mg by mouth daily as needed      escitalopram (LEXAPRO) 20 MG tablet Take 1 tablet by mouth daily      famotidine (PEPCID) 20 MG tablet Take 1 tablet (20 mg) by mouth 2 times daily 180 tablet 3    LORazepam (ATIVAN) 0.5 MG tablet Take 0.5 mg by mouth every 6 hours as needed      magnesium hydroxide (MOM) 2400 MG/10ML SUSP Take 5 mLs by mouth as needed for constipation      omeprazole (PRILOSEC) 40 MG DR capsule Take 1 capsule (40 mg) by mouth daily 90 capsule 3    ondansetron (ZOFRAN ODT) 4 MG ODT tab Take 1 tablet (4 mg) by mouth every 8 hours as needed for nausea 20 tablet 3    semaglutide (OZEMPIC, 0.25 OR 0.5 MG/DOSE,) 2 MG/1.5ML SOPN pen -Start Ozempic 0.25 mg weekly for 2 weeks then increase to 0.5 mg weekly for 1 month and then increase to 1.0 mg weekly thereafter (Patient not taking: Reported on 9/26/2022) 4.5 mL 0    Semaglutide, 1 MG/DOSE, 4 MG/3ML SOPN Inject 1 mg Subcutaneous once a week To be used after finishing 0.5 mg weekly (Patient not taking: Reported on 9/26/2022) 9 mL 3    verapamil (CALAN) 40 MG tablet TAKE 1 TABLET BY MOUTH TWICE A  tablet 1    ZOLMitriptan (ZOMIG) 2.5 MG tablet Take 1 tablet (2.5 mg) by mouth at onset of headache for migraine May repeat in 2 hours. Max 4 tablets/24 hours. 18 tablet 9     No facility-administered encounter medications on file as of 4/16/2024.        Allergies   Allergen Reactions    Codeine Anaphylaxis    Hydromorphone Nausea  and Vomiting     Zofran DOES NOT HELP, only compounds problem, PER PT.         Review of systems:  A full 10 point review of systems was obtained and was negative except for the pertinent positives and negatives stated within the HPI.    Objective Findings:   Physical Exam:    Constitutional: There were no vitals taken for this visit.  General: Alert, cooperative, no distress, well-appearing  Head: Atraumatic, normocephalic, no obvious abnormalities   Eyes: Sclera anicteric, no obvious conjunctival hemorrhage   Nose: Nares normal, no obvious malformation, no obvious rhinorrhea   Respiratory: Resting comfortably, no apparent distress, no cough.   Skin: No jaundice, no obvious rash  Neurologic: AAOx3, no obvious neurologic abnormality  Psychiatric: Normal Affect, appropriate mood  Extremities: No obvious edema, no obvious malformation     Labs, Radiology, Pathology     Lab Results   Component Value Date    WBC 6.8 04/26/2022    WBC 7.8 11/27/2018    WBC 7.7 09/02/2017    HGB 12.9 (L) 04/26/2022    HGB 13.6 11/27/2018    HGB 15.0 09/02/2017     04/26/2022     11/27/2018     09/02/2017    TRIG 113 06/29/2022    ALT 78 (H) 04/26/2022    ALT 78 (H) 11/27/2018    ALT 95 (H) 12/11/2016    AST 43 04/26/2022    AST 33 11/27/2018    AST 55 (H) 12/11/2016     04/26/2022     11/27/2018     09/02/2017    BUN 17 04/26/2022    BUN 18 11/27/2018    BUN 19 09/02/2017    CO2 25 04/26/2022    CO2 25 11/27/2018    CO2 23 09/02/2017    TSH 0.79 12/11/2016    INR 1.04 12/11/2016        Liver Function Studies -   Recent Labs   Lab Test 04/26/22  1111   PROTTOTAL 7.7   ALBUMIN 3.6   BILITOTAL 0.3   ALKPHOS 158*   AST 43   ALT 78*          Patient Active Problem List    Diagnosis Date Noted    Esophageal reflux 03/24/2022     Priority: Medium    Depressive disorder 03/24/2022     Priority: Medium    Anxiety 03/24/2022     Priority: Medium    DIEGO (obstructive sleep apnea)- severe (AHI 35) 03/24/2022      Priority: Medium     3/23/2022 [facility] Sleep Study (-) - AHI 35.3, RDI 38.3, Supine AHI 69.1, Lateral AHI 5, REM AHI 1.7, Low O2 78.0%, Time Spent ?88% 20.0 minutes / Time Spent ?89% 28.2 minutes.      Class 1 obesity due to excess calories with serious comorbidity and body mass index (BMI) of 32.0 to 32.9 in adult 03/24/2022     Priority: Low      Assessment and Plan   Assessment/Plan:    Kirby Patel is a 38 year old male with significant past medical history pertinent for anxiety, depression, DIEGO, migraine headaches who is presenting as a follow up patient however as a new patient to myself with a chief complaint of reflux and BRBPR.    # BRBPR   Colonoscopy in 2021 that was unremarkable, suspected to have outlet bleeding.  Patient was seen by colorectal surgery team 7/12/2023 with findings notable for internal hemorrhoids.  Options of hemorrhoid banding versus conservative management were discussed.  Despite increase indaily fiber supplementation and water intake Kirby continued to be symptomatic.    - Return to colorectal surgery team for hemorrhoidal banding  - It is okay to use Preparation H 1 pea-sized placed into the rectum 1-2 times per day.  However please do not use for greater than 7 days as this can result in perianal irritation  - Start MiraLAX 17g (1 capful) daily this can be increased to twice daily dosing if needed  - Increase daily fiber supplementation to 1 tablespoon in the morning and 1 tablespoon in the evening.  This can be increased to 3 tablespoons daily as needed    # GERD  1/2020 EGD with findings notable for reactive gastropathy and peptic duodenitis, without erosive esophagitis.  Biopsies negative for H. pylori, intestinal metaplasia or dysplasia. Duodenal biopsies with foveolar metaplasia consistent with peptic duodenitis.      Overall symptoms have improved with current acid suppressive regimen and focus on healthy dietary habits and intentional weight loss. Denies  breakthrough symptoms of heartburn/regurgitation and symptoms of dysphagia.    - Deescalate Omeprazole to 20 mg once daily, best taken on an empty stomach at least 30 - 60 minutes prior to the first meal of the day   - Continue Famotidine 20 mg twice daily once in the morning and once in the evening. After 2-4 weeks use of Omeprazole 20 mg once daily deescalate to Famotidine 20 mg once in the evening.   - Reflux friendly lifestyle modifications as directed within AVS    #Elevated ALP - Bone Origin  #Elevated ALT  Chronic mild elevation in ALT <2x ULN (7+ years) with new mild elevation in alkaline phosphatase <2x ULN. Fractionated ALP was of bone origin. CLD panel negative. Ultrasound of the abdomen 2019 with normal echotexture of the liver without focal abnormality.    - Repeat LFT   - Follow-up with your primary care provider form ALP of bone origin        Follow up plan:   Return to clinic 6 months and as needed.    The risks and benefits of my recommendations, as well as other treatment options were discussed with the patient and any available family today. All questions were answered.     Follow up: As planned above. Today, I personally spent 31 minutes in direct face to face time with the patient, of which greater than 50% of the time was spent in patient education and counseling as described above. Approximately 6 minutes were spent on indirect care associated with the patient's consultation including but not limited to review of: patient medical records to date, clinic visits, hospital records, lab results, imaging studies, procedural documentation, and coordinating care with other providers. The findings from this review are summarized in the above note. All of the above accounted for a cumulative time of 43 minutes and was performed on the date of service.     The patient verbalized understanding of the plan and was appreciative for the time spent and information provided during the office visit.        Documentation assisted by voice recognition and documentation system.          Again, thank you for allowing me to participate in the care of your patient.        Sincerely,    Sol Vo PA-C

## 2024-04-16 NOTE — PATIENT INSTRUCTIONS
Nutrition Goals  Consider trying kodiak cakes (protein waffles and pancakes)    Reduce zhang intake to 2x/week   Incorporate more plant based meals   Use new creamer (caramel macchiato) 2x/week   Consider starting day with a walk  Cut back on bread intake/consider other options (rye, whole wheat)    Vegetarian Protein Ideas  Quinoa  Nuts  Beans  Lentils  Protein pasta   Nutritional yeast  Garden of life raw meal powder  Liquid aminos  Homemade meats - a taco meat could be made with chopped cauliflower/mushroom as an example   Hummus (could do homemade if preferred)  Hemp hearts     Vegetarian Meals   [email or web address]      Types of Carbohydrates  [email or web address]     Types of fats  https://www.[facility].[place].edu/nutritionsource/what-should-you-eat/fats-and-cholesterol/types-of-fat/    High Fiber Foods:  Bran cereal, 1/3 cup, 8.6 gm fiber   Cooked kidney beans   cup 7.9 gm  Cooked lentils   cup 7.8 gm  Cooked black beans   cup 7.6 gm  Canned chickpeas   cup 5.3 gm  Baked beans   cup 5.2 gm  Pear 1 5.1 gm  Soybeans   cup 5.1 gm  Quinoa   cup 5 gm  Natan seeds, 1 tbsp 5 gm  Baked sweet potato, with skin 1 medium 4.8 gm  Avocado, half small 4.5 gm  Baked potato, with skin 1 medium 4.4 gm  Cooked frozen green peas   cup 4.4 gm  Bulgur   cup 4.1 gm  Cooked frozen mixed vegetables   cup 4 gm  Raspberries   cup 4 gm  Blackberries   cup 3.8 gm  Almonds 1 oz 3.5 gm  Cooked frozen spinach   cup 3.5 gm  Vegetable or soy darlene 1 each 3.4 gm  Apple 1 medium 3.3 gm  Dried dates 5 pieces 3.3 gm      ADDITIONAL RESOURCES:   The Plate Method  Http://www.fvfiles.[email or web address].pdf    Protein Sources   http://[email or web address]     Mindful Eating  [email or web address]://[email or web address]     Summary of Volumetrics Eating Plan  http://[email or web address]     Follow-Up:  Tuesday, October 11th at 9:30 am    RUPERTO Rider, RD, LD  Clinic #: [phone]    
No

## 2024-04-17 NOTE — TELEPHONE ENCOUNTER
RX Authorization    Medication:     Date last refill ordered:    Quantity ordered:    # refills:    Date of last clinic visit with ordering provider:    Date of next clinic visit with ordering provider:    All pertinent protocol data (lab date/result):    Include pertinent information from patients message:

## 2024-04-19 ENCOUNTER — TELEPHONE (OUTPATIENT)
Dept: GASTROENTEROLOGY | Facility: CLINIC | Age: 39
End: 2024-04-19
Payer: COMMERCIAL

## 2024-04-19 NOTE — TELEPHONE ENCOUNTER
Patient confirmed scheduled appointment:  Date: 10/22/24  Time: 3:15pm  Visit type: Return Esophageal  Provider: Sol Vo PA-C  Location: Virtual  Testing/imaging: N/A  Additional notes: N/A

## 2024-04-23 NOTE — TELEPHONE ENCOUNTER
Diagnosis, Referred by & from: Rectal Bleeding   Appt date: 7/12/2023   NOTES STATUS DETAILS   OFFICE NOTE from referring provider Internal 7/27/23, 4/16/2024 - GI OV with EDI Vo   OFFICE NOTE from other specialist Received / Internal [facility]:  7/27/23, 4/16/2024 - GI OV with EDI Vo  7/12/23 - COLON SURG OV with FRIDA Menjivar  4/12/23 - GI OV with MERCEDES Mcgee  1/18/22 - GI OV with MERCEDES Toribio  3/27/19 - GI OV with Dr. Roxy Chisholm:  6/29/22, 7/8/21 - PCC OV with Dr. Melendrez  3/23/16, 7/1/15 - UC OV with Dr. Israel   DISCHARGE SUMMARY from hospital N/A     DISCHARGE REPORT from the ER Internal [facility]:  11/27/18 - ED OV with Dr. Angela   OPERATIVE REPORT N/A     MEDICATION LIST Internal     LABS       BIOPSIES/PATHOLOGY RELATED TO DIAGNOSIS Internal [facility]:  1/18/21 - Colon Biopsy (Case: )   DIAGNOSTIC PROCEDURES       COLONOSCOPY Internal [facility]:  1/18/21 - Colonoscopy   UPPER ENDOSCOPY (EGD) Internal [facility]:  1/23/20 - EGD   IMAGING (DISC & REPORT)       ULTRASOUND  (ENDOANAL/ENDORECTAL) Internal [facility]:  7/15/19 - US Abdomen

## 2024-04-24 ENCOUNTER — LAB (OUTPATIENT)
Dept: LAB | Facility: CLINIC | Age: 39
End: 2024-04-24
Payer: COMMERCIAL

## 2024-04-24 DIAGNOSIS — D64.9 ANEMIA, UNSPECIFIED TYPE: ICD-10-CM

## 2024-04-24 DIAGNOSIS — R79.89 ELEVATED LFTS: ICD-10-CM

## 2024-04-24 DIAGNOSIS — K62.5 BRBPR (BRIGHT RED BLOOD PER RECTUM): ICD-10-CM

## 2024-04-24 LAB — HGB BLD-MCNC: 12.2 G/DL (ref 13.3–17.7)

## 2024-04-24 PROCEDURE — 84443 ASSAY THYROID STIM HORMONE: CPT

## 2024-04-24 PROCEDURE — 86364 TISS TRNSGLTMNASE EA IG CLAS: CPT

## 2024-04-24 PROCEDURE — 82306 VITAMIN D 25 HYDROXY: CPT

## 2024-04-24 PROCEDURE — 82310 ASSAY OF CALCIUM: CPT

## 2024-04-24 PROCEDURE — 82728 ASSAY OF FERRITIN: CPT

## 2024-04-24 PROCEDURE — 80076 HEPATIC FUNCTION PANEL: CPT

## 2024-04-24 PROCEDURE — 82784 ASSAY IGA/IGD/IGG/IGM EACH: CPT

## 2024-04-24 PROCEDURE — 86709 HEPATITIS A IGM ANTIBODY: CPT

## 2024-04-24 PROCEDURE — 36415 COLL VENOUS BLD VENIPUNCTURE: CPT

## 2024-04-24 PROCEDURE — 85018 HEMOGLOBIN: CPT

## 2024-04-24 PROCEDURE — 83540 ASSAY OF IRON: CPT

## 2024-04-24 PROCEDURE — 83550 IRON BINDING TEST: CPT

## 2024-04-25 LAB
ALBUMIN SERPL BCG-MCNC: 4.4 G/DL (ref 3.5–5.2)
ALP SERPL-CCNC: 136 U/L (ref 40–150)
ALT SERPL W P-5'-P-CCNC: 46 U/L (ref 0–70)
AST SERPL W P-5'-P-CCNC: 24 U/L (ref 0–45)
BILIRUB DIRECT SERPL-MCNC: <0.2 MG/DL (ref 0–0.3)
BILIRUB SERPL-MCNC: 0.3 MG/DL
CALCIUM SERPL-MCNC: 9.7 MG/DL (ref 8.6–10)
HAV IGM SERPL QL IA: NONREACTIVE
PROT SERPL-MCNC: 7.3 G/DL (ref 6.4–8.3)
VIT D+METAB SERPL-MCNC: 57 NG/ML (ref 20–50)

## 2024-04-26 DIAGNOSIS — D64.9 ANEMIA, UNSPECIFIED TYPE: ICD-10-CM

## 2024-04-26 DIAGNOSIS — K62.5 BRBPR (BRIGHT RED BLOOD PER RECTUM): Primary | ICD-10-CM

## 2024-04-26 LAB
FERRITIN SERPL-MCNC: 20 NG/ML (ref 31–409)
IRON BINDING CAPACITY (ROCHE): 269 UG/DL (ref 240–430)
IRON SATN MFR SERPL: 22 % (ref 15–46)
IRON SERPL-MCNC: 59 UG/DL (ref 61–157)
TSH SERPL DL<=0.005 MIU/L-ACNC: 1.71 UIU/ML (ref 0.3–4.2)

## 2024-04-29 LAB
IGA SERPL-MCNC: 399 MG/DL (ref 84–499)
TTG IGA SER-ACNC: 2.1 U/ML
TTG IGG SER-ACNC: <0.6 U/ML

## 2024-05-21 NOTE — PROGRESS NOTES
Colon and Rectal Surgery Clinic Note    RE: Kirby Patel.  : 1985.  WAGNER: 2024.    Reason for visit: Rectal bleeding.      HPI:  Kirby saw Nisha Roth NP in 2023 for rectal bleeding, she noted grade 2-3 internal hemorrhoids and discussed hemorrhoid banding vs conservative management. He chose conservative management with fiber supplements daily and miralax. He is here today to follow up given that he still has episodes of severe bleeding characterized by bright red blood per rectum associated with and without bowel movements.  He does feel that the skin is sometimes raw and has mucus drainage but no pain per se.  He typically will have 2-3 bowel movements per day without straining but he does feel like the stool comes out in tony or small pieces.  Tolerating diet well.  Adequate fiber and water consumption.  Does have a history of IBS.  Not on aspirin or anticoagulants.  No previous anorectal operations.  Denies fecal incontinence. Colonoscopy  was normal with normal biopsies.  Recent checkup with GI showed borderline anemia but iron supplements were not recommended to avoid constipation.    Medical history:  Past Medical History:   Diagnosis Date    Anxiety     Depressive disorder     Intractable vomiting with nausea, unspecified vomiting type 2016    Kidney stone on right side 2016    DIEGO (obstructive sleep apnea)- severe (AHI 35) 3/24/2022       Surgical history:  Past Surgical History:   Procedure Laterality Date    COLONOSCOPY      COLONOSCOPY N/A 2021    Procedure: COLONOSCOPY, WITH  BIOPSY;  Surgeon: Spencer Barraza MD;  [place] [facility] [place]       Family history:  Family History   Problem Relation Age of Onset    Diabetes Mother     Obesity Mother     Hyperlipidemia Mother     Uterine Cancer Maternal Grandmother        Medications:  Current Outpatient Medications   Medication Sig Dispense Refill    amphetamine-dextroamphetamine (ADDERALL XR)  10 MG 24 hr capsule Take 10 mg by mouth every morning Taking 3 tablet daily      escitalopram (LEXAPRO) 20 MG tablet Take 1 tablet by mouth daily      famotidine (PEPCID) 20 MG tablet Take 1 tablet (20 mg) by mouth 2 times daily 180 tablet 3    omeprazole (PRILOSEC) 40 MG DR capsule Take 1 capsule (40 mg) by mouth daily 90 capsule 3    verapamil (CALAN) 40 MG tablet TAKE 1 TABLET BY MOUTH TWICE A  tablet 1    ADVAIR DISKUS 250-50 MCG/ACT inhaler Inhale 1 puff into the lungs 2 times daily      albuterol (PROAIR HFA/PROVENTIL HFA/VENTOLIN HFA) 108 (90 Base) MCG/ACT inhaler Inhale 1 puff into the lungs as needed (Patient not taking: Reported on 6/14/2024)      amphetamine-dextroamphetamine (ADDERALL) 5 MG tablet Take 5-10 mg by mouth daily as needed      LORazepam (ATIVAN) 0.5 MG tablet Take 0.5 mg by mouth every 6 hours as needed      magnesium hydroxide (MOM) 2400 MG/10ML SUSP Take 5 mLs by mouth as needed for constipation (Patient not taking: Reported on 6/14/2024)      omeprazole (PRILOSEC) 20 MG DR capsule Take 1 capsule (20 mg) by mouth daily for 90 days (Patient not taking: Reported on 6/14/2024) 90 capsule 0    ondansetron (ZOFRAN ODT) 4 MG ODT tab Take 1 tablet (4 mg) by mouth every 8 hours as needed for nausea (Patient not taking: Reported on 6/14/2024) 20 tablet 3    semaglutide (OZEMPIC, 0.25 OR 0.5 MG/DOSE,) 2 MG/1.5ML SOPN pen -Start Ozempic 0.25 mg weekly for 2 weeks then increase to 0.5 mg weekly for 1 month and then increase to 1.0 mg weekly thereafter (Patient not taking: Reported on 9/26/2022) 4.5 mL 0    Semaglutide, 1 MG/DOSE, 4 MG/3ML SOPN Inject 1 mg Subcutaneous once a week To be used after finishing 0.5 mg weekly (Patient not taking: Reported on 9/26/2022) 9 mL 3    ZOLMitriptan (ZOMIG) 2.5 MG tablet Take 1 tablet (2.5 mg) by mouth at onset of headache for migraine May repeat in 2 hours. Max 4 tablets/24 hours. 18 tablet 9       Allergies:  Allergies   Allergen Reactions    Codeine  Anaphylaxis    Hydromorphone Nausea and Vomiting     Zofran DOES NOT HELP, only compounds problem, PER PT.        Social history:   Social History     Tobacco Use    Smoking status: Never    Smokeless tobacco: Never   Substance Use Topics    Alcohol use: Not Currently     Marital status: single.    Physical Examination:  /78 (BP Location: Right arm, Patient Position: Sitting, Cuff Size: Adult Regular)   Pulse 96   Wt 86.6 kg (191 lb)   SpO2 99%   BMI 32.79 kg/m    General: Well hydrated. No acute distress.  Perianal external examination:  Perianal skin: intact.  Lesions: No.  Eversion of buttocks: There was not evidence of an anal fissure. Details: N/A.  Skin tags or external hemorrhoids: No significant external component.  Digital rectal examination: Was performed.   Sphincter tone: Hypertonic.  Palpable lesions: No.  Other: None.  Bimanual examination: was not performed.    Anoscopy: Was performed.   Hemorrhoids: Yes.  I was only able to introduce the endoscope once and this was quite painful for the patient but he does have very large grade 3 internal hemorrhoids.  No evidence of active bleeding.  Lesions: No    Investigations:  None.    Procedures:  None.    ASSESSMENT  39-year-old male with IBS and persistent rectal bleeding with large grade 3 internal hemorrhoids.  Rubber band ligation was discussed but the patient would prefer to have sedation. Risks, benefits, and alternatives of operative treatment were thoroughly discussed with the patient, he/she understands these well and agrees to proceed.    PLAN  1.  High-fiber diet.  2.  Schedule internal hemorrhoidectomy.  Will need PAC and 2 Fleet enemas.    30 minutes spent on the date of encounter performing chart review, history and exam, documentation and further activities as noted above with an additional 5 minutes for anoscopy.     Ronen Glaser MD, MSc, FACS, FASCRS.    Chief, Division of Colon & Rectal Surgery  Bryce  M. Goldberg, MD Endowed Chair, Colon & Rectal Surgery  Department of Surgery  [facility]      Referring Provider:  Referred Self, MD  No address on file     Primary Care Provider:  Riley, [facility]

## 2024-06-14 ENCOUNTER — PRE VISIT (OUTPATIENT)
Dept: SURGERY | Facility: CLINIC | Age: 39
End: 2024-06-14

## 2024-06-14 ENCOUNTER — OFFICE VISIT (OUTPATIENT)
Dept: SURGERY | Facility: CLINIC | Age: 39
End: 2024-06-14
Payer: COMMERCIAL

## 2024-06-14 ENCOUNTER — TELEPHONE (OUTPATIENT)
Dept: SURGERY | Facility: CLINIC | Age: 39
End: 2024-06-14

## 2024-06-14 VITALS
BODY MASS INDEX: 32.79 KG/M2 | HEART RATE: 96 BPM | DIASTOLIC BLOOD PRESSURE: 78 MMHG | WEIGHT: 191 LBS | SYSTOLIC BLOOD PRESSURE: 121 MMHG | OXYGEN SATURATION: 99 %

## 2024-06-14 DIAGNOSIS — K62.5 RECTAL BLEEDING: Primary | ICD-10-CM

## 2024-06-14 DIAGNOSIS — K64.8 INTERNAL HEMORRHOIDS: Primary | ICD-10-CM

## 2024-06-14 PROCEDURE — 99203 OFFICE O/P NEW LOW 30 MIN: CPT | Mod: 25 | Performed by: COLON & RECTAL SURGERY

## 2024-06-14 PROCEDURE — 46600 DIAGNOSTIC ANOSCOPY SPX: CPT | Performed by: COLON & RECTAL SURGERY

## 2024-06-14 ASSESSMENT — PAIN SCALES - GENERAL: PAINLEVEL: NO PAIN (0)

## 2024-06-14 NOTE — NURSING NOTE
Chief Complaint   Patient presents with    New Patient     Rectal Bleeding       Vitals:    06/14/24 1450   BP: 121/78   BP Location: Right arm   Patient Position: Sitting   Cuff Size: Adult Regular   Pulse: 96   SpO2: 99%   Weight: 86.6 kg (191 lb)       Body mass index is 32.79 kg/m .      Ramírez Jennings MA

## 2024-06-14 NOTE — LETTER
2024      Kirby Patel  [address]  [place] [address]      Dear Colleague,    Thank you for referring your patient, Kirby Patel, to the [facility] [place]. Please see a copy of my visit note below.    Colon and Rectal Surgery Clinic Note    RE: Kirby Patel.  : 1985.  WAGNER: 2024.    Reason for visit: Rectal bleeding.      HPI:  Kirby saw Nisha Roth NP in 2023 for rectal bleeding, she noted grade 2-3 internal hemorrhoids and discussed hemorrhoid banding vs conservative management. He chose conservative management with fiber supplements daily and miralax. He is here today to follow up given that he still has episodes of severe bleeding characterized by bright red blood per rectum associated with and without bowel movements.  He does feel that the skin is sometimes raw and has mucus drainage but no pain per se.  He typically will have 2-3 bowel movements per day without straining but he does feel like the stool comes out in tony or small pieces.  Tolerating diet well.  Adequate fiber and water consumption.  Does have a history of IBS.  Not on aspirin or anticoagulants.  No previous anorectal operations.  Denies fecal incontinence. Colonoscopy  was normal with normal biopsies.  Recent checkup with GI showed borderline anemia but iron supplements were not recommended to avoid constipation.    Medical history:  Past Medical History:   Diagnosis Date     Anxiety      Depressive disorder      Intractable vomiting with nausea, unspecified vomiting type 2016     Kidney stone on right side 2016     DIEGO (obstructive sleep apnea)- severe (AHI 35) 3/24/2022       Surgical history:  Past Surgical History:   Procedure Laterality Date     COLONOSCOPY       COLONOSCOPY N/A 2021    Procedure: COLONOSCOPY, WITH  BIOPSY;  Surgeon: Spencer Barraza MD;  Location: [facility] OR       Family history:  Family History   Problem  Relation Age of Onset     Diabetes Mother      Obesity Mother      Hyperlipidemia Mother      Uterine Cancer Maternal Grandmother        Medications:  Current Outpatient Medications   Medication Sig Dispense Refill     amphetamine-dextroamphetamine (ADDERALL XR) 10 MG 24 hr capsule Take 10 mg by mouth every morning Taking 3 tablet daily       escitalopram (LEXAPRO) 20 MG tablet Take 1 tablet by mouth daily       famotidine (PEPCID) 20 MG tablet Take 1 tablet (20 mg) by mouth 2 times daily 180 tablet 3     omeprazole (PRILOSEC) 40 MG DR capsule Take 1 capsule (40 mg) by mouth daily 90 capsule 3     verapamil (CALAN) 40 MG tablet TAKE 1 TABLET BY MOUTH TWICE A  tablet 1     ADVAIR DISKUS 250-50 MCG/ACT inhaler Inhale 1 puff into the lungs 2 times daily       albuterol (PROAIR HFA/PROVENTIL HFA/VENTOLIN HFA) 108 (90 Base) MCG/ACT inhaler Inhale 1 puff into the lungs as needed (Patient not taking: Reported on 6/14/2024)       amphetamine-dextroamphetamine (ADDERALL) 5 MG tablet Take 5-10 mg by mouth daily as needed       LORazepam (ATIVAN) 0.5 MG tablet Take 0.5 mg by mouth every 6 hours as needed       magnesium hydroxide (MOM) 2400 MG/10ML SUSP Take 5 mLs by mouth as needed for constipation (Patient not taking: Reported on 6/14/2024)       omeprazole (PRILOSEC) 20 MG DR capsule Take 1 capsule (20 mg) by mouth daily for 90 days (Patient not taking: Reported on 6/14/2024) 90 capsule 0     ondansetron (ZOFRAN ODT) 4 MG ODT tab Take 1 tablet (4 mg) by mouth every 8 hours as needed for nausea (Patient not taking: Reported on 6/14/2024) 20 tablet 3     semaglutide (OZEMPIC, 0.25 OR 0.5 MG/DOSE,) 2 MG/1.5ML SOPN pen -Start Ozempic 0.25 mg weekly for 2 weeks then increase to 0.5 mg weekly for 1 month and then increase to 1.0 mg weekly thereafter (Patient not taking: Reported on 9/26/2022) 4.5 mL 0     Semaglutide, 1 MG/DOSE, 4 MG/3ML SOPN Inject 1 mg Subcutaneous once a week To be used after finishing 0.5 mg weekly  (Patient not taking: Reported on 9/26/2022) 9 mL 3     ZOLMitriptan (ZOMIG) 2.5 MG tablet Take 1 tablet (2.5 mg) by mouth at onset of headache for migraine May repeat in 2 hours. Max 4 tablets/24 hours. 18 tablet 9       Allergies:  Allergies   Allergen Reactions     Codeine Anaphylaxis     Hydromorphone Nausea and Vomiting     Zofran DOES NOT HELP, only compounds problem, PER PT.        Social history:   Social History     Tobacco Use     Smoking status: Never     Smokeless tobacco: Never   Substance Use Topics     Alcohol use: Not Currently     Marital status: single.    Physical Examination:  /78 (BP Location: Right arm, Patient Position: Sitting, Cuff Size: Adult Regular)   Pulse 96   Wt 86.6 kg (191 lb)   SpO2 99%   BMI 32.79 kg/m    General: Well hydrated. No acute distress.  Perianal external examination:  Perianal skin: intact.  Lesions: No.  Eversion of buttocks: There was not evidence of an anal fissure. Details: N/A.  Skin tags or external hemorrhoids: No significant external component.  Digital rectal examination: Was performed.   Sphincter tone: Hypertonic.  Palpable lesions: No.  Other: None.  Bimanual examination: was not performed.    Anoscopy: Was performed.   Hemorrhoids: Yes.  I was only able to introduce the endoscope once and this was quite painful for the patient but he does have very large grade 3 internal hemorrhoids.  No evidence of active bleeding.  Lesions: No    Investigations:  None.    Procedures:  None.    ASSESSMENT  39-year-old male with IBS and persistent rectal bleeding with large grade 3 internal hemorrhoids.  Rubber band ligation was discussed but the patient would prefer to have sedation. Risks, benefits, and alternatives of operative treatment were thoroughly discussed with the patient, he/she understands these well and agrees to proceed.    PLAN  1.  High-fiber diet.  2.  Schedule internal hemorrhoidectomy.  Will need PAC and 2 Fleet enemas.    30 minutes spent on  the date of encounter performing chart review, history and exam, documentation and further activities as noted above with an additional 5 minutes for anoscopy.     Ronen Glaser MD, MSc, FACS, FASCRS.    Chief, Division of Colon & Rectal Surgery  Stanley M. Goldberg, MD Endowed Chair, Colon & Rectal Surgery  Department of Surgery  [facility]      Referring Provider:  Referred Self, MD  No address on file     Primary Care Provider:  [facility]      Again, thank you for allowing me to participate in the care of your patient.        Sincerely,        Ronen Glaser MD

## 2024-06-14 NOTE — PATIENT INSTRUCTIONS
Follow up:  Schedule hemorrhoidectomy by calling Jonna at [phone]      Please call with any questions or concerns regarding your clinic visit today.    It is a pleasure being involved in your health care.    Contacts post-consultation depending on your need:    Radiology Appointments [phone]    Schedule Clinic Appointments [phone] # 1   M-F 7:30 - 5 pm    RONAL Fuller [phone]    Clinic Fax Number [phone]    Surgery Scheduling [phone]    My Chart is available 24 hours a day and is a secure way to access your records and communicate with your care team.  I strongly recommend signing up if you haven't already done so, if you are comfortable with computers.  If you would like to inquire about this or are having problems with My Chart access, you may call [phone] or go online at [email or web address].  Please allow at least 24 hours for a response and extra time on weekends and Holidays.

## 2024-06-14 NOTE — TELEPHONE ENCOUNTER
Patient is scheduled for surgery with Dr. Ronen Glaser    Spoke with: Kirby    Date of Surgery: Friday June 21, 2024    Location: [facility] ([facility])     Informed patient they will need an adult  YES    Pre op with Provider Dr. Ronen Glaser     H&P: Scheduled with PAC on on 6/20/2024 at 12:00 PM    Post-op Scheduled with Nisha Roth NP on 7/10/2024 at 11:00 AM    Surgery packet: sending via [employer] only per patient request     Jonna Russo  Ana Paula-op Coordinator  [place]-Rectal Surgery  Direct Phone: [phone]

## 2024-06-17 ENCOUNTER — TELEPHONE (OUTPATIENT)
Dept: SURGERY | Facility: CLINIC | Age: 39
End: 2024-06-17
Payer: COMMERCIAL

## 2024-06-17 NOTE — TELEPHONE ENCOUNTER
FUTURE VISIT INFORMATION      SURGERY INFORMATION:  Date:  *Dr. Glaser at [facility] // Hemorrhoidectomy, Internal // General   Consult: ov 24    RECORDS REQUESTED FROM:       Primary Care Provider: Kathrine    Most recent EKG+ Tracin22    Most recent Sleep Study:   3/23/22

## 2024-06-20 ENCOUNTER — ANESTHESIA EVENT (OUTPATIENT)
Dept: SURGERY | Facility: CLINIC | Age: 39
End: 2024-06-20

## 2024-06-20 ENCOUNTER — LAB (OUTPATIENT)
Dept: LAB | Facility: CLINIC | Age: 39
End: 2024-06-20
Payer: COMMERCIAL

## 2024-06-20 ENCOUNTER — PRE VISIT (OUTPATIENT)
Dept: SURGERY | Facility: CLINIC | Age: 39
End: 2024-06-20

## 2024-06-20 ENCOUNTER — OFFICE VISIT (OUTPATIENT)
Dept: SURGERY | Facility: CLINIC | Age: 39
End: 2024-06-20
Payer: COMMERCIAL

## 2024-06-20 VITALS
DIASTOLIC BLOOD PRESSURE: 75 MMHG | BODY MASS INDEX: 33.17 KG/M2 | HEART RATE: 86 BPM | OXYGEN SATURATION: 98 % | WEIGHT: 194.3 LBS | RESPIRATION RATE: 16 BRPM | TEMPERATURE: 98.1 F | SYSTOLIC BLOOD PRESSURE: 116 MMHG | HEIGHT: 64 IN

## 2024-06-20 DIAGNOSIS — Z01.818 PREOP EXAMINATION: ICD-10-CM

## 2024-06-20 DIAGNOSIS — K64.8 INTERNAL HEMORRHOIDS: ICD-10-CM

## 2024-06-20 DIAGNOSIS — Z01.818 PREOP EXAMINATION: Primary | ICD-10-CM

## 2024-06-20 LAB
ANION GAP SERPL CALCULATED.3IONS-SCNC: 9 MMOL/L (ref 7–15)
BUN SERPL-MCNC: 15.9 MG/DL (ref 6–20)
CALCIUM SERPL-MCNC: 9 MG/DL (ref 8.6–10)
CHLORIDE SERPL-SCNC: 107 MMOL/L (ref 98–107)
CREAT SERPL-MCNC: 1.08 MG/DL (ref 0.67–1.17)
DEPRECATED HCO3 PLAS-SCNC: 24 MMOL/L (ref 22–29)
EGFRCR SERPLBLD CKD-EPI 2021: 90 ML/MIN/1.73M2
GLUCOSE SERPL-MCNC: 83 MG/DL (ref 70–99)
POTASSIUM SERPL-SCNC: 4 MMOL/L (ref 3.4–5.3)
SODIUM SERPL-SCNC: 140 MMOL/L (ref 135–145)

## 2024-06-20 PROCEDURE — 80048 BASIC METABOLIC PNL TOTAL CA: CPT | Performed by: PATHOLOGY

## 2024-06-20 PROCEDURE — 99203 OFFICE O/P NEW LOW 30 MIN: CPT | Performed by: NURSE PRACTITIONER

## 2024-06-20 PROCEDURE — 36415 COLL VENOUS BLD VENIPUNCTURE: CPT | Performed by: PATHOLOGY

## 2024-06-20 ASSESSMENT — PAIN SCALES - GENERAL: PAINLEVEL: NO PAIN (0)

## 2024-06-20 ASSESSMENT — ENCOUNTER SYMPTOMS: ORTHOPNEA: 0

## 2024-06-20 NOTE — PATIENT INSTRUCTIONS
Name:  Kirby Patel   MRN:  [id number]   :  1985   Today's Date:  2024         You were seen today for a pre-operative assessment in the:    Pre-operative Anesthesia Assessment Center(PAC)  [facility]  [address] [place] [address]  phone [phone]      You will be receiving a call with location, date, arrival time and diet instructions from Preadmission Nursing at your surgical site:    -[facility]: [phone]   -[facility]: [phone]  -[facility]: [phone]  -[facility]: [phone]  -[facility]: [phone]  -[facility]: [phone]  -[facility]: [phone]  -[facility]: [phone]        Anesthesia recommendations for medications:    Hold Aspirin for 7 days before procedure.  Hold Multivitamins for 7 days before procedure.   Hold Herbal medications and Supplements for 7 days before procedure.  Hold Ibuprofen for 1 day before procedure.   Hold Naproxen for 4 days before procedure.     No alcohol or cannabis products for 24 hours before your procedure         Please DO NOT take the following medications the day of procedure:    Adderall  Zolmitriptan (Zomig) - do not take this day before or day of surgery    Please take these medications per your usual routine:    Albuterol inhaler if needed and bring this day of surgery  Escitalopram (Lexapro)  Famotidine (Pepcid)  Lorazepam (Ativan) if needed  Omeprazole (Prilosec)  Ondansetron (Zofran) if needed  Verapamil (Calan)      How do I prepare myself?  - Please take 2 showers (one the night prior to surgery and one the morning of surgery) using Scrubcare or Hibiclens soap.    Use this soap only from the neck to your toes.     Leave the soap on your skin for one minute--then rinse thoroughly.      You may use your own shampoo and conditioner. No other hair products.   - Please remove all jewelry and body piercings.  - No  lotions, deodorants or fragrance.  - No makeup or fingernail polish.   - Bring your ID and insurance card.    -If you have a Deep Brain Stimulator, a Spinal Cord Stimulator, or any implanted Neuro Device, you must bring the remote to your appointment         For further questions regarding your surgery please call your surgeon's office.

## 2024-06-20 NOTE — H&P
Pre-Operative H & P     CC:  Preoperative exam to assess for increased cardiopulmonary risk while undergoing surgery and anesthesia.    Date of Encounter: 6/20/2024  Primary Care Physician:  Dea Linon Rudy     Reason for visit:   Encounter Diagnoses   Name Primary?    Preop examination Yes    Internal hemorrhoids        HPI  Kirby Patel is a 39 year old male who presents for pre-operative H & P in preparation for  Procedure Information       Date/Time: 6/21/24 @1330     Procedure: hemorrhoidectomy    Anesthesia type: general    Pre-op diagnosis: internal hemorroids    Location: [facility] [place]    Providers: Dr. Glaser            Kirby Patel is a 39 year old male with sleep apnea, asthma, migraines, anemia, obesity, IBS, GERD, anxiety, ADHD and depression that has internal hemorrhoids.  He was seen by CHILANGO Boykin last summer for rectal bleeding.  Internal hemorrhoids were noted at that time.  Both conservative management and hemorrhoid banding was discussed and he opted to trial conservative management at that time.  Due to ongoing symptoms including bleeding, her returned for an OV with Dr. Glaser on 6/14/24.  All management options were discussed.  He elected to proceed with surgical treatment as noted above.     History is obtained from the patient and chart review    Hx of abnormal bleeding or anti-platelet use: none      Past Medical History  Past Medical History:   Diagnosis Date    Anxiety     Depressive disorder     Intractable vomiting with nausea, unspecified vomiting type 11/13/2016    Kidney stone on right side 11/13/2016    DIEGO (obstructive sleep apnea)- severe (AHI 35) 3/24/2022       Past Surgical History  Past Surgical History:   Procedure Laterality Date    COLONOSCOPY      COLONOSCOPY N/A 1/18/2021    Procedure: COLONOSCOPY, WITH  BIOPSY;  Surgeon: Spencer Barraza MD;  Location[place] [facility] [place]       Prior to Admission Medications  Current  Outpatient Medications   Medication Sig Dispense Refill    albuterol (PROAIR HFA/PROVENTIL HFA/VENTOLIN HFA) 108 (90 Base) MCG/ACT inhaler Inhale 1 puff into the lungs as needed      amphetamine-dextroamphetamine (ADDERALL XR) 10 MG 24 hr capsule Take 10 mg by mouth 3 times daily      escitalopram (LEXAPRO) 20 MG tablet Take 1 tablet by mouth every morning      famotidine (PEPCID) 20 MG tablet Take 1 tablet (20 mg) by mouth 2 times daily 180 tablet 3    LORazepam (ATIVAN) 0.5 MG tablet Take 0.5 mg by mouth every 6 hours as needed      omeprazole (PRILOSEC) 40 MG DR capsule Take 1 capsule (40 mg) by mouth daily (Patient taking differently: Take 40 mg by mouth every morning) 90 capsule 3    ondansetron (ZOFRAN ODT) 4 MG ODT tab Take 1 tablet (4 mg) by mouth every 8 hours as needed for nausea 20 tablet 3    verapamil (CALAN) 40 MG tablet TAKE 1 TABLET BY MOUTH TWICE A  tablet 1    ZOLMitriptan (ZOMIG) 2.5 MG tablet Take 1 tablet (2.5 mg) by mouth at onset of headache for migraine May repeat in 2 hours. Max 4 tablets/24 hours. 18 tablet 9    omeprazole (PRILOSEC) 40 MG DR capsule Take 1 capsule (40 mg) by mouth daily 90 capsule 2       Allergies  Allergies   Allergen Reactions    Codeine Anaphylaxis    Hydromorphone Nausea and Vomiting     Zofran DOES NOT HELP, only compounds problem, PER PT.        Social History  Social History     Socioeconomic History    Marital status: Single     Spouse name: Not on file    Number of children: 0    Years of education: Not on file    Highest education level: Not on file   Occupational History    Occupation: grad student   Tobacco Use    Smoking status: Never    Smokeless tobacco: Never   Vaping Use    Vaping status: Never Used   Substance and Sexual Activity    Alcohol use: Not Currently    Drug use: No    Sexual activity: Yes     Partners: Male   Other Topics Concern    Not on file   Social History Narrative    Not on file     Social Determinants of Health     Financial  Resource Strain: Not on file   Food Insecurity: Not on file   Transportation Needs: Not on file   Physical Activity: Not on file   Stress: Not on file   Social Connections: Not on file   Interpersonal Safety: Not on file   Housing Stability: Not on file       Family History  Family History   Problem Relation Age of Onset    Diabetes Mother     Obesity Mother     Hyperlipidemia Mother     Uterine Cancer Maternal Grandmother     Anesthesia Reaction No family hx of     Thrombosis No family hx of        Review of Systems  The complete review of systems is negative other than noted in the HPI or here.   Anesthesia Evaluation   Pt has had prior anesthetic.     No history of anesthetic complications       ROS/MED HX  ENT/Pulmonary:     (+) sleep apnea, uses CPAP,                   Intermittent, asthma  Treatment: Inhaler prn,              (-) recent URI   Neurologic:     (+)      migraines,                          Cardiovascular:     (+)  - -   -  - -                                 Previous cardiac testing   Echo: Date: Results:    Stress Test:  Date: Results:    ECG Reviewed:  Date: 6/2022 Results:  NSR  Cath:  Date: Results:   (-) SPRAGUE and orthopnea/PND   METS/Exercise Tolerance: >4 METS Comment: Doesn't exercise purposefully, but can walk around campus, clean, go up and down the stairs in his home, etc.        Denies any exertional dyspnea or angina.    Hematologic:     (+)      anemia,          Musculoskeletal:  - neg musculoskeletal ROS     GI/Hepatic: Comment: IBS    Internal hemorrhoids    (+) GERD, Asymptomatic on medication,                  Renal/Genitourinary:  - neg Renal ROS     Endo:     (+)               Obesity,       Psychiatric/Substance Use:     (+) psychiatric history anxiety, depression and other (comment) (ADHD)       Infectious Disease:  - neg infectious disease ROS     Malignancy:  - neg malignancy ROS     Other:  - neg other ROS          /75 (BP Location: Right arm, Patient Position:  "Sitting, Cuff Size: Adult Regular)   Pulse 86   Temp 98.1  F (36.7  C) (Oral)   Resp 16   Ht 1.626 m (5' 4\")   Wt 88.1 kg (194 lb 4.8 oz)   SpO2 98%   BMI 33.35 kg/m      Physical Exam   Constitutional: Awake, alert, cooperative, no apparent distress, and appears stated age. obese  Eyes: Pupils equal, round and reactive to light, extra ocular muscles intact, sclera clear, conjunctiva normal.  HENT: Normocephalic, oral pharynx with moist mucus membranes, good dentition. No goiter appreciated.   Respiratory: Clear to auscultation bilaterally, no crackles or wheezing.  Cardiovascular: Regular rate and rhythm, normal S1 and S2, and no murmur noted.  Carotids +2, no bruits. No edema. Palpable pulses to radial  DP and PT arteries.   GI: Normal bowel sounds, soft, non-distended, non-tender, no masses palpated, no hepatosplenomegaly.   Lymph/Hematologic: No cervical lymphadenopathy and no supraclavicular lymphadenopathy.  Genitourinary:  deferred  Skin: Warm and dry.  No rashes at anticipated surgical site.   Musculoskeletal: Full ROM of neck. There is no redness, warmth, or swelling of the exposed joints. Gross motor strength is normal.    Neurologic: Awake, alert, oriented to name, place and time. Cranial nerves II-XII are grossly intact. Gait is normal.   Neuropsychiatric: Calm, cooperative. Normal affect.     Prior Labs/Diagnostic Studies   All labs and imaging personally reviewed     EKG as above.     Component      Latest Ref Rng 4/24/2024  8:32 AM   Hemoglobin      13.3 - 17.7 g/dL 12.2 (L)       Legend:  (L) Low    The patient's records and results personally reviewed by this provider.     Outside records reviewed from: Care Everywhere    LAB/DIAGNOSTIC STUDIES TODAY:    Component      Latest Ref Rng 6/20/2024  12:58 PM   Sodium      135 - 145 mmol/L 140    Potassium      3.4 - 5.3 mmol/L 4.0    Chloride      98 - 107 mmol/L 107    Carbon Dioxide (CO2)      22 - 29 mmol/L 24    Anion Gap      7 - 15 mmol/L 9  " "  Urea Nitrogen      6.0 - 20.0 mg/dL 15.9    Creatinine      0.67 - 1.17 mg/dL 1.08    GFR Estimate      >60 mL/min/1.73m2 90    Calcium      8.6 - 10.0 mg/dL 9.0    Glucose      70 - 99 mg/dL 83          Assessment    Kirby Patel is a 39 year old male seen as a PAC referral for risk assessment and optimization for anesthesia.    Plan/Recommendations  Pt will be optimized for the proposed procedure.  See below for details on the assessment, risk, and preoperative recommendations    NEUROLOGY  - No history of TIA, CVA or seizure  - hold zolmitriptan 24 hours prior to procedure.     -Post Op delirium risk factors:  No risk identified    ENT  - No current airway concerns.  Will need to be reassessed day of surgery.  Mallampati: I  TM: > 3    CARDIAC  - No history of CAD, Hypertension, and Afib  - METS (Metabolic Equivalents)  Patient performs 4 or more METS exercise without symptoms             Total Score: 0      RCRI-Very low risk: Class 1 0.4% complication rate             Total Score: 0        PULMONARY  - Obstructive Sleep Apnea  DIEGO with home CPAP.  Patient will be instructed to bring their home CPAP device to the hospital with them.    - Asthma  Well controlled  - Tobacco History    History   Smoking Status    Never   Smokeless Tobacco    Never       GI  - GERD is well controlled with omeprazole and pepcid.   PONV Low Risk  Total Score: 1           1 AN PONV: Patient is not a current smoker            ENDOCRINE    - BMI: Estimated body mass index is 33.35 kg/m  as calculated from the following:    Height as of this encounter: 1.626 m (5' 4\").    Weight as of this encounter: 88.1 kg (194 lb 4.8 oz).  Obesity (BMI >30)  - No history of Diabetes Mellitus    HEME  VTE Low Risk 0.5%             Total Score: 2    VTE: Male      - No history of abnormal bleeding or antiplatelet use.  - Chronic anemia  Recommend perioperative use of blood conservation techniques intraoperatively and close monitoring for " postoperative bleeding.        PSYCH  - hold adderall DOS  - continue al other mental health medications without interruption.      Different anesthesia methods/types have been discussed with the patient, but they are aware that the final plan will be decided by the assigned anesthesia provider on the date of service.      The patient is optimized for their procedure. AVS with information on surgery time/arrival time, meds and NPO status given by nursing staff. No further diagnostic testing indicated.      On the day of service:     Prep time: 7 minutes  Visit time: 14 minutes  Documentation time: 7 minutes  ------------------------------------------  Total time: 28 minutes      CHILANGO Vásquez CNP  Preoperative Assessment Center  [facility]  Phone: [phone]  Fax: [phone]

## 2024-06-20 NOTE — RESULT ENCOUNTER NOTE
Elsa Orr,    Your test results are attached.  Your blood test is normal and good for surgery tomorrow.         Valerie Childers DNP, RN, ANP-C

## 2024-06-21 ENCOUNTER — TRANSFERRED RECORDS (OUTPATIENT)
Dept: HEALTH INFORMATION MANAGEMENT | Facility: CLINIC | Age: 39
End: 2024-06-21
Payer: COMMERCIAL

## 2024-06-21 DIAGNOSIS — K64.8 INTERNAL HEMORRHOIDS: Primary | ICD-10-CM

## 2024-06-21 PROCEDURE — 88304 TISSUE EXAM BY PATHOLOGIST: CPT | Mod: 26 | Performed by: PATHOLOGY

## 2024-06-21 PROCEDURE — 88304 TISSUE EXAM BY PATHOLOGIST: CPT | Mod: TC,ORL,XU | Performed by: COLON & RECTAL SURGERY

## 2024-06-21 RX ORDER — IBUPROFEN 800 MG/1
800 TABLET, FILM COATED ORAL 3 TIMES DAILY
Qty: 30 TABLET | Refills: 1 | Status: SHIPPED | OUTPATIENT
Start: 2024-06-21 | End: 2024-07-31

## 2024-06-21 RX ORDER — ACETAMINOPHEN 500 MG
1000 TABLET ORAL 4 TIMES DAILY
Qty: 30 TABLET | Refills: 1 | Status: SHIPPED | OUTPATIENT
Start: 2024-06-21

## 2024-06-21 RX ORDER — OXYCODONE HYDROCHLORIDE 5 MG/1
5 TABLET ORAL EVERY 6 HOURS PRN
Qty: 12 TABLET | Refills: 0 | Status: SHIPPED | OUTPATIENT
Start: 2024-06-21 | End: 2024-06-24

## 2024-06-21 RX ORDER — METRONIDAZOLE 250 MG/1
250 TABLET ORAL 3 TIMES DAILY
Qty: 15 TABLET | Refills: 0 | Status: SHIPPED | OUTPATIENT
Start: 2024-06-21 | End: 2024-06-26

## 2024-06-21 RX ORDER — ANORECTAL OINTMENT 15.7; .44; 24; 20.6 G/100G; G/100G; G/100G; G/100G
OINTMENT TOPICAL 4 TIMES DAILY PRN
Qty: 113 G | Refills: 3 | Status: SHIPPED | OUTPATIENT
Start: 2024-06-21

## 2024-06-23 DIAGNOSIS — G43.709 CHRONIC MIGRAINE WITHOUT AURA WITHOUT STATUS MIGRAINOSUS, NOT INTRACTABLE: ICD-10-CM

## 2024-06-24 ENCOUNTER — TELEPHONE (OUTPATIENT)
Dept: SURGERY | Facility: CLINIC | Age: 39
End: 2024-06-24
Payer: COMMERCIAL

## 2024-06-24 ENCOUNTER — LAB REQUISITION (OUTPATIENT)
Dept: LAB | Facility: CLINIC | Age: 39
End: 2024-06-24
Payer: COMMERCIAL

## 2024-06-24 DIAGNOSIS — R11.0 NAUSEA: Primary | ICD-10-CM

## 2024-06-24 DIAGNOSIS — G43.709 CHRONIC MIGRAINE WITHOUT AURA WITHOUT STATUS MIGRAINOSUS, NOT INTRACTABLE: ICD-10-CM

## 2024-06-24 RX ORDER — ONDANSETRON 4 MG/1
4 TABLET, ORALLY DISINTEGRATING ORAL EVERY 8 HOURS PRN
Qty: 20 TABLET | Refills: 3 | OUTPATIENT
Start: 2024-06-24

## 2024-06-24 RX ORDER — ONDANSETRON 4 MG/1
4 TABLET, ORALLY DISINTEGRATING ORAL EVERY 8 HOURS PRN
Qty: 10 TABLET | Refills: 0 | Status: SHIPPED | OUTPATIENT
Start: 2024-06-24 | End: 2024-07-31

## 2024-06-24 NOTE — TELEPHONE ENCOUNTER
[facility] Call Center    Phone Message    May a detailed message be left on voicemail: yes     Reason for Call: Other: Patient calling to talk about medications. Please call patient to discuss.      Action Taken: Message routed to:  [facility] ([facility]): VALERIO    Travel Screening: Not Applicable     Date of Service:

## 2024-06-24 NOTE — TELEPHONE ENCOUNTER
S/p hemorrhoidectomy on 6/21 with . States he is having nausea with the oxycodone and flagyl. Discussed with . Sent in a script for zofran.

## 2024-06-24 NOTE — TELEPHONE ENCOUNTER
Rx Authorization:  Requested Medication/ Dose ondansetron (ZOFRAN ODT) 4 MG ODT tab   Date last refill ordered: 7/7/22  Quantity ordered: 20  # refills: 0  Date of last clinic visit with ordering provider: 7/31/22  Date of next clinic visit with ordering provider: 7/31/24  All pertinent protocol data (lab date/result):   Include pertinent information from patients message:

## 2024-06-25 ENCOUNTER — NURSE TRIAGE (OUTPATIENT)
Dept: SURGERY | Facility: CLINIC | Age: 39
End: 2024-06-25
Payer: COMMERCIAL

## 2024-06-25 LAB
PATH REPORT.COMMENTS IMP SPEC: NORMAL
PATH REPORT.COMMENTS IMP SPEC: NORMAL
PATH REPORT.FINAL DX SPEC: NORMAL
PATH REPORT.GROSS SPEC: NORMAL
PATH REPORT.MICROSCOPIC SPEC OTHER STN: NORMAL
PATH REPORT.RELEVANT HX SPEC: NORMAL
PHOTO IMAGE: NORMAL

## 2024-06-25 RX ORDER — ONDANSETRON 4 MG/1
4 TABLET, ORALLY DISINTEGRATING ORAL EVERY 8 HOURS PRN
Qty: 20 TABLET | Refills: 0 | Status: SHIPPED | OUTPATIENT
Start: 2024-06-25

## 2024-06-25 NOTE — TELEPHONE ENCOUNTER
Patient called reporting red-flag symptom: muscle spasms -severe; no comfort    Per the [facility] Red-Flag symptom list, patient was: warm transferred to Triage Nurse

## 2024-06-25 NOTE — TELEPHONE ENCOUNTER
"Nurse Triage SBAR    Is this a 2nd Level Triage? YES, LICENSED PRACTITIONER REVIEW IS REQUIRED    Situation:    S/p hemorrhoidectomy on 6/21 with .     -Since last night: he has taken muscle relaxant( ?) not seen on med list except Lorazepam- not specifically muscle relaxant,used ointment rectally, sitz baths.  -Feels like spasms are worsening/ severe and area is very swollen> unable to rest due to severe spasmodic pain.  -Oxycodone- not taken for two days secondary to feeling unwell/ nauseated on Oxy( allergies also to codeine and hydromorphone)  - Zofran has helped nausea , he took x 1 and is eating and hydrating, no vomiting.  - He has BM passed since surgery- it was painful, able to use stool softener to assist plus MOM helped pass stool.  -Urinating well       Background: Recent hemorrhoidectomy 6/21/24 Alfredito      Assessment: Looking for relief of severe (8/-10/10) rectal spasmodic pain in light of current interventions; sitz baths, Calmseptine ointment, muscle relaxant(?) not listed?. Tyl and ibuporofen not as helpful.Oxycodone makes him ill. ( allergies also to codeine and hydromorphone)    Protocol Recommended Disposition/ recommendation:   He wishes to review with CLR team for possible additional interventions  Postop: 7/10/24 juliette Decker        Reason for Disposition   Severe rectal pain   MODERATE-SEVERE rectal pain (i.e., interferes with school, work, or sleep)    Additional Information   Negative: Sounds like a life-threatening emergency to the triager   Negative: Diarrhea is main symptom   Negative: Constipation is main symptom (e.g., pain or discomfort caused by passage of hard BMs)   Negative: Blood in or on bowel movement is main symptom   Negative: Injury to rectum   Negative: Patient sounds very sick or weak to the triager    Answer Assessment - Initial Assessment Questions  1. SYMPTOM:  \"What's the main symptom you're concerned about?\" (e.g., pain, itching, swelling, rash)      " "spasms  2. ONSET: \"    Progressively worsening since yesterday 6/24  3. RECTAL PAIN: \"Do you have any pain around your rectum?\" \"How bad is the pain?\"  (Scale 0-10; or mild, moderate, severe)       - SEVERE (8-10): excruciating pain, unable to have a bowel movement     No  oxycodone  4. RECTAL ITCHING: \"Do you have any itching in this area?\" \"How bad is the itching?\"  (Scale 0-10; or mild, moderate, severe)    - NONE: no itching    - MILD: doesn't interfere with normal activities     - MODERATE-SEVERE: interferes with normal activities or awakens from sleep      5. CONSTIPATION: \"Do you have constipation?\" If Yes, ask: \"How bad is it?\"   Nope  6. CAUSE: \"What do you think is causing the anus symptoms?\"  Unclear  7. OTHER SYMPTOMS: \"Do you have any other symptoms?\"  (e.g., abdomen pain, fever, rectal bleeding, vomiting)       Abdominal pain- negative, fever negative,nausea improved on Zofran, slight bleeding with rectal BM    Protocols used: Rectal Symptoms-A-OH    "

## 2024-06-25 NOTE — TELEPHONE ENCOUNTER
Patient calls today with discomfort s/p hemorrhoidectomy. Recent hemorrhoidectomy 6/21/24 Alfredito.    He is taking Robaxin 3-4 times a day, tylenol and ibuprofen around the clock.  He is urinating adequately  Having regular BM's with the use of miralax.  He will try to start the oxycodone with zofran 30 mins before to help with pain management    No red flag symptoms. Continue plan as discussed with patient.

## 2024-07-05 ENCOUNTER — TELEPHONE (OUTPATIENT)
Dept: SURGERY | Facility: CLINIC | Age: 39
End: 2024-07-05
Payer: COMMERCIAL

## 2024-07-05 NOTE — TELEPHONE ENCOUNTER
Patient confirmed scheduled appointment:  Date: 7/12/24  Time: 11:00 am  Visit type: Post-op  Provider: Yomaira Sanchez  Location: [facility]  Testing/imaging: n/a  Additional notes: pt requested to reschedule 7/10 appt with DEYANIRA

## 2024-07-12 ENCOUNTER — OFFICE VISIT (OUTPATIENT)
Dept: SURGERY | Facility: CLINIC | Age: 39
End: 2024-07-12
Payer: COMMERCIAL

## 2024-07-12 VITALS
BODY MASS INDEX: 31.77 KG/M2 | HEART RATE: 96 BPM | DIASTOLIC BLOOD PRESSURE: 77 MMHG | SYSTOLIC BLOOD PRESSURE: 124 MMHG | WEIGHT: 186.1 LBS | HEIGHT: 64 IN | OXYGEN SATURATION: 98 %

## 2024-07-12 DIAGNOSIS — Z87.19 S/P HEMORRHOIDECTOMY: Primary | ICD-10-CM

## 2024-07-12 DIAGNOSIS — Z98.890 S/P HEMORRHOIDECTOMY: Primary | ICD-10-CM

## 2024-07-12 PROCEDURE — 99024 POSTOP FOLLOW-UP VISIT: CPT

## 2024-07-12 RX ORDER — FAMOTIDINE 20 MG/1
20 TABLET, FILM COATED ORAL 2 TIMES DAILY
Qty: 180 TABLET | Refills: 3 | OUTPATIENT
Start: 2024-07-12

## 2024-07-12 RX ORDER — VERAPAMIL HYDROCHLORIDE 40 MG/1
TABLET ORAL
Qty: 180 TABLET | Refills: 1 | OUTPATIENT
Start: 2024-07-12

## 2024-07-12 ASSESSMENT — PAIN SCALES - GENERAL: PAINLEVEL: NO PAIN (0)

## 2024-07-12 NOTE — PROGRESS NOTES
"Colon and Rectal Surgery Postoperative Clinic Note    Patient: Kirby Patel  YOB: 1985  Date of Visit: 7/12/2024    Kirby Patel is a very pleasant 39 year old male now status post hemorrhoidectomy on 6/21/24 with Dr. Glaser.    Final Diagnosis      A().  Mucosa and soft tissue, anal region,  right posterior, excision:  -Squamocolumnar mucosa with prominent underlying dilated blood vessels consistent with hemorrhoidal tissue.  -Negative for dysplasia or malignancy.    B(). Mucosa and soft tissue, anal region,  left lateral, excision:  -Squamocolumnar mucosa with prominent underlying dilated blood vessels consistent with hemorrhoidal tissue.  -Negative for dysplasia or malignancy.    C(). Mucosa and soft tissue, anal region,  right anterior, excision:  -Squamocolumnar mucosa with prominent underlying dilated blood vessels consistent with hemorrhoidal tissue.  -Negative for dysplasia or malignancy.     Interval history: Currently doing well and having minimal pain. The first 2 weeks after surgery were really rough and very painful. He did not tolerate the oxycodone due to nausea and zofran did not help with this. He had to tough it out without the oxycodone. Now things are better. There was a lot of drainage at first but now there is almost none. Stopped using the Miralax. Bowel movements are mostly back to baseline except yesterday he got a little constipated. Denies fecal incontinence or leakage.    Physical Examination:   /77 (BP Location: Left arm, Patient Position: Sitting, Cuff Size: Adult Regular)   Pulse 96   Ht 5' 4\"   Wt 186 lb 1.6 oz   SpO2 98%   BMI 31.94 kg/m    General: alert, oriented, in no acute distress, sitting comfortably  Respiratory: non-labored breathing on RA  Perianal External Exam: Surgical wounds shallow, health-appearing. No drainage or surrounding erythema    Assessment/Plan:  39 year old male now status post hemorrhoidectomy on 6/21/24 with Dr." Isatulizette. Had a rough recovery initially due to pain but now doing well. Recommended a daily fiber supplement. Contact us with any questions or concerns in the meantime. Patient's questions were answered to their stated satisfaction and they are in agreement with this plan.      Past Medical History:   Diagnosis Date    Anxiety     Depressive disorder     Intractable vomiting with nausea, unspecified vomiting type 11/13/2016    Kidney stone on right side 11/13/2016    DIEGO (obstructive sleep apnea)- severe (AHI 35) 3/24/2022     Past Surgical History:   Procedure Laterality Date    COLONOSCOPY      COLONOSCOPY N/A 1/18/2021    Procedure: COLONOSCOPY, WITH  BIOPSY;  Surgeon: Spencer Barraza MD;  Location: [facility] OR     Current Outpatient Medications   Medication Sig Dispense Refill    acetaminophen (TYLENOL) 500 MG tablet Take 2 tablets (1,000 mg) by mouth 4 times daily 30 tablet 1    albuterol (PROAIR HFA/PROVENTIL HFA/VENTOLIN HFA) 108 (90 Base) MCG/ACT inhaler Inhale 1 puff into the lungs as needed      amphetamine-dextroamphetamine (ADDERALL XR) 10 MG 24 hr capsule Take 10 mg by mouth 3 times daily      escitalopram (LEXAPRO) 20 MG tablet Take 1 tablet by mouth every morning      famotidine (PEPCID) 20 MG tablet Take 1 tablet (20 mg) by mouth 2 times daily 180 tablet 3    ibuprofen (ADVIL/MOTRIN) 800 MG tablet Take 1 tablet (800 mg) by mouth 3 times daily 30 tablet 1    LORazepam (ATIVAN) 0.5 MG tablet Take 0.5 mg by mouth every 6 hours as needed      menthol-zinc oxide (CALMOSEPTINE) 0.44-20.6 % OINT ointment Apply topically 4 times daily as needed for skin protection Apply thick layer to perianal skin 3-4 times daily for skin irritation. 113 g 3    omeprazole (PRILOSEC) 40 MG DR capsule Take 1 capsule (40 mg) by mouth daily 90 capsule 2    omeprazole (PRILOSEC) 40 MG DR capsule Take 1 capsule (40 mg) by mouth daily (Patient taking differently: Take 40 mg by mouth every morning) 90 capsule 3     ondansetron (ZOFRAN ODT) 4 MG ODT tab Take 1 tablet (4 mg) by mouth every 8 hours as needed for nausea 20 tablet 0    ondansetron (ZOFRAN ODT) 4 MG ODT tab Take 1 tablet (4 mg) by mouth every 8 hours as needed for nausea 10 tablet 0    verapamil (CALAN) 40 MG tablet TAKE 1 TABLET BY MOUTH TWICE A  tablet 1    ZOLMitriptan (ZOMIG) 2.5 MG tablet Take 1 tablet (2.5 mg) by mouth at onset of headache for migraine May repeat in 2 hours. Max 4 tablets/24 hours. 18 tablet 9     Allergies   Allergen Reactions    Codeine Anaphylaxis    Hydromorphone Nausea and Vomiting     Zofran DOES NOT HELP, only compounds problem, PER PT.      Family History   Problem Relation Age of Onset    Diabetes Mother     Obesity Mother     Hyperlipidemia Mother     Uterine Cancer Maternal Grandmother     Anesthesia Reaction No family hx of     Thrombosis No family hx of      Social History     Tobacco Use    Smoking status: Never    Smokeless tobacco: Never   Substance Use Topics    Alcohol use: Not Currently     Marital status: single.      Yomaira Sanchez PA-C  Colon and Rectal Surgery  [facility]      No LOS data to display  Time spent on date of encounter doing chart review, history and exam, documentation, and further activities in this note. This is a postoperative visit.

## 2024-07-12 NOTE — LETTER
"7/12/2024       RE: Kirby Patel  [address]  [facility] [place] [address]     Dear Colleague,    Thank you for referring your patient, Kirby Patel, to the [facility] COL[facility] AND [facility]CT[facility]ERY [facility] [place] at [facility]. Please see a copy of my visit note below.    Colon and Rectal Surgery Postoperative Clinic Note    Patient: Kirby Patel  YOB: 1985  Date of Visit: 7/12/2024    Kirby Patel is a very pleasant 39 year old male now status post hemorrhoidectomy on 6/21/24 with Dr. Glaser.    Final Diagnosis      A().  Mucosa and soft tissue, anal region,  right posterior, excision:  -Squamocolumnar mucosa with prominent underlying dilated blood vessels consistent with hemorrhoidal tissue.  -Negative for dysplasia or malignancy.    B(). Mucosa and soft tissue, anal region,  left lateral, excision:  -Squamocolumnar mucosa with prominent underlying dilated blood vessels consistent with hemorrhoidal tissue.  -Negative for dysplasia or malignancy.    C(). Mucosa and soft tissue, anal region,  right anterior, excision:  -Squamocolumnar mucosa with prominent underlying dilated blood vessels consistent with hemorrhoidal tissue.  -Negative for dysplasia or malignancy.     Interval history: Currently doing well and having minimal pain. The first 2 weeks after surgery were really rough and very painful. He did not tolerate the oxycodone due to nausea and zofran did not help with this. He had to tough it out without the oxycodone. Now things are better. There was a lot of drainage at first but now there is almost none. Stopped using the Miralax. Bowel movements are mostly back to baseline except yesterday he got a little constipated. Denies fecal incontinence or leakage.    Physical Examination:   /77 (BP Location: Left arm, Patient Position: Sitting, Cuff Size: Adult Regular)   Pulse 96   Ht 5' 4\"   Wt 186 lb 1.6 oz   " SpO2 98%   BMI 31.94 kg/m    General: alert, oriented, in no acute distress, sitting comfortably  Respiratory: non-labored breathing on RA  Perianal External Exam: Surgical wounds shallow, health-appearing. No drainage or surrounding erythema    Assessment/Plan:  39 year old male now status post hemorrhoidectomy on 6/21/24 with Dr. Glaser. Had a rough recovery initially due to pain but now doing well. Recommended a daily fiber supplement. Contact us with any questions or concerns in the meantime. Patient's questions were answered to their stated satisfaction and they are in agreement with this plan.      Past Medical History:   Diagnosis Date     Anxiety      Depressive disorder      Intractable vomiting with nausea, unspecified vomiting type 11/13/2016     Kidney stone on right side 11/13/2016     DIEGO (obstructive sleep apnea)- severe (AHI 35) 3/24/2022     Past Surgical History:   Procedure Laterality Date     COLONOSCOPY       COLONOSCOPY N/A 1/18/2021    Procedure: COLONOSCOPY, WITH  BIOPSY;  Surgeon: Spencer Barraza MD;  Location: [facility] OR     Current Outpatient Medications   Medication Sig Dispense Refill     acetaminophen (TYLENOL) 500 MG tablet Take 2 tablets (1,000 mg) by mouth 4 times daily 30 tablet 1     albuterol (PROAIR HFA/PROVENTIL HFA/VENTOLIN HFA) 108 (90 Base) MCG/ACT inhaler Inhale 1 puff into the lungs as needed       amphetamine-dextroamphetamine (ADDERALL XR) 10 MG 24 hr capsule Take 10 mg by mouth 3 times daily       escitalopram (LEXAPRO) 20 MG tablet Take 1 tablet by mouth every morning       famotidine (PEPCID) 20 MG tablet Take 1 tablet (20 mg) by mouth 2 times daily 180 tablet 3     ibuprofen (ADVIL/MOTRIN) 800 MG tablet Take 1 tablet (800 mg) by mouth 3 times daily 30 tablet 1     LORazepam (ATIVAN) 0.5 MG tablet Take 0.5 mg by mouth every 6 hours as needed       menthol-zinc oxide (CALMOSEPTINE) 0.44-20.6 % OINT ointment Apply topically 4 times daily as needed for skin  protection Apply thick layer to perianal skin 3-4 times daily for skin irritation. 113 g 3     omeprazole (PRILOSEC) 40 MG DR capsule Take 1 capsule (40 mg) by mouth daily 90 capsule 2     omeprazole (PRILOSEC) 40 MG DR capsule Take 1 capsule (40 mg) by mouth daily (Patient taking differently: Take 40 mg by mouth every morning) 90 capsule 3     ondansetron (ZOFRAN ODT) 4 MG ODT tab Take 1 tablet (4 mg) by mouth every 8 hours as needed for nausea 20 tablet 0     ondansetron (ZOFRAN ODT) 4 MG ODT tab Take 1 tablet (4 mg) by mouth every 8 hours as needed for nausea 10 tablet 0     verapamil (CALAN) 40 MG tablet TAKE 1 TABLET BY MOUTH TWICE A  tablet 1     ZOLMitriptan (ZOMIG) 2.5 MG tablet Take 1 tablet (2.5 mg) by mouth at onset of headache for migraine May repeat in 2 hours. Max 4 tablets/24 hours. 18 tablet 9     Allergies   Allergen Reactions     Codeine Anaphylaxis     Hydromorphone Nausea and Vomiting     Zofran DOES NOT HELP, only compounds problem, PER PT.      Family History   Problem Relation Age of Onset     Diabetes Mother      Obesity Mother      Hyperlipidemia Mother      Uterine Cancer Maternal Grandmother      Anesthesia Reaction No family hx of      Thrombosis No family hx of      Social History     Tobacco Use     Smoking status: Never     Smokeless tobacco: Never   Substance Use Topics     Alcohol use: Not Currently     Marital status: single.      Yomaira Sanchez PA-C  Colon and Rectal Surgery  [facility]      No LOS data to display  Time spent on date of encounter doing chart review, history and exam, documentation, and further activities in this note. This is a postoperative visit.

## 2024-07-12 NOTE — NURSING NOTE
"Chief Complaint   Patient presents with    Post-op Visit       Vitals:    07/12/24 1101   BP: 124/77   BP Location: Left arm   Patient Position: Sitting   Cuff Size: Adult Regular   Pulse: 96   SpO2: 98%   Weight: 186 lb 1.6 oz   Height: 5' 4\"       Body mass index is 31.94 kg/m .    Lani Wilson CMA    "

## 2024-07-13 ENCOUNTER — HEALTH MAINTENANCE LETTER (OUTPATIENT)
Age: 39
End: 2024-07-13

## 2024-07-31 ENCOUNTER — OFFICE VISIT (OUTPATIENT)
Dept: NEUROLOGY | Facility: CLINIC | Age: 39
End: 2024-07-31
Payer: COMMERCIAL

## 2024-07-31 VITALS
OXYGEN SATURATION: 98 % | DIASTOLIC BLOOD PRESSURE: 78 MMHG | BODY MASS INDEX: 31.93 KG/M2 | SYSTOLIC BLOOD PRESSURE: 117 MMHG | HEART RATE: 79 BPM | WEIGHT: 186 LBS

## 2024-07-31 DIAGNOSIS — G43.709 CHRONIC MIGRAINE WITHOUT AURA WITHOUT STATUS MIGRAINOSUS, NOT INTRACTABLE: ICD-10-CM

## 2024-07-31 PROCEDURE — 99213 OFFICE O/P EST LOW 20 MIN: CPT | Performed by: NURSE PRACTITIONER

## 2024-07-31 RX ORDER — ZOLMITRIPTAN 2.5 MG/1
2.5 TABLET, FILM COATED ORAL
Qty: 18 TABLET | Refills: 9 | Status: SHIPPED | OUTPATIENT
Start: 2024-07-31

## 2024-07-31 ASSESSMENT — MIGRAINE DISABILITY ASSESSMENT (MIDAS)
HOW OFTEN WERE SOCIAL ACTIVITIES MISSED DUE TO HEADACHES: 2
HOW MANY DAYS DID YOU NOT DO HOUSEWORK BECAUSE OF HEADACHES: 2
TOTAL SCORE: 9
ON A SCALE FROM 0-10 ON AVERAGE HOW PAINFUL WERE HEADACHES: 6
HOW MANY DAYS DID YOU MISS WORK OR SCHOOL BECAUSE OF HEADACHES: 1
HOW MANY DAYS IN THE PAST 3 MONTHS HAVE YOU HAD A HEADACHE: 4
HOW MANY DAYS WAS YOUR PRODUCTIVITY CUT IN HALF BECAUSE OF HEADACHES: 3
HOW MANY DAYS WAS HOUSEWORK PRODUCTIVITY CUT IN HALF DUE TO HEADACHES: 1

## 2024-07-31 ASSESSMENT — HEADACHE IMPACT TEST (HIT 6)
HOW OFTEN DID HEADACHS LIMIT CONCENTRATION ON WORK OR DAILY ACTIVITY: SOMETIMES
HOW OFTEN DO HEADACHES LIMIT YOUR DAILY ACTIVITIES: VERY OFTEN
HOW OFTEN HAVE YOU FELT TOO TIRED TO WORK BECAUSE OF YOUR HEADACHES: SOMETIMES
WHEN YOU HAVE A HEADACHE HOW OFTEN DO YOU WISH YOU COULD LIE DOWN: ALWAYS
WHEN YOU HAVE HEADACHES HOW OFTEN IS THE PAIN SEVERE: SOMETIMES
HOW OFTEN HAVE YOU FELT FED UP OR IRRITATED BECAUSE OF YOUR HEADACHES: SOMETIMES
HIT6 TOTAL SCORE: 64

## 2024-07-31 NOTE — PROGRESS NOTES
"[facility]    Headache Neurology Progress Note  July 31, 2024      Assessment/Plan:   Kirby Patel is a 39 year old   Migraine chronic  Acute Treatment:  -For acute treatment of mild headache, take acetaminophen as needed. Do not exceed more than 14 days per month to avoid medication overuse.  -For acute treatment of moderate to severe headache, take zolmitriptan at the onset of headache, with a repeat dose in two hours if needed. Do not exceed more than 9 days per month to avoid medication overuse.  -For headache related nausea, or as a rescue medicine for headache, take ondansetron as needed.     Preventive Treatment:  Staying hydrated and not going hungry  Stress reduction   Keep sleep routine  May try vit B2 200-400 mg daily OTC if tolerated and may add magnesium 400 mg daily or at bedtime OTC if tolerated -may cause GI symptoms-bloating, constipation or diarrhea   Monitor headaches for frequency, severity and need for rescue treatment     Follow up at least once a year or sooner if needed     The longitudinal plan of care for Kirby was addressed during this visit. Due to the added complexity in care, I will continue to support Kirby in the subsequent management of headache and with the ongoing continuity of care of this condition(s).    All of patient's questions were answered from the best of my knowledge.  Patient is in agreement with the plan.     20 minutes spent on the date of the encounter doing face to face visit, chart  review, results review,  meds review, treatment plan, documentation and further activities as noted above    CHILANGO Martini, CNP [facility]        Subjective:    Kirby aPtel returns for follow up of headaches   Verapamil run out 2 weeks and headaches slowly coming back. Was fine for a first couple days or slight \"aura of headache\". Now had 2 migraines -had to cancel a meeting and take a medication.   Uses zolmitriptan as " needed. In the last 3 month took it about 3 times for migraines. No side effects with zolmitriptan.   Stopped ibuprofen use due to hemorrhoidectomy. Uses acetaminophen for headaches and had to use it twice in 2 weeks. Takes 2-3 hours for headache to improve with acetaminophen. Drinks water and eats banana, yogurt and peanuts.   Does stretching and started walking more   Takes time away from computer and started painting  Uses CPAP diligently   Depression worsen post procedure and much better after seeing mental health provider an an emergency counseling.   Objective:   Vitals: /78 (BP Location: Right arm, Patient Position: Sitting, Cuff Size: Adult Regular)   Pulse 79   Wt 84.4 kg (186 lb)   SpO2 98%   BMI 31.93 kg/m    General: Cooperative, NAD  Neurologic:  Mental Status: Fully alert, attentive and oriented. Speech clear and fluent.   Cranial Nerves: Facial movements symmetric.   Motor: No abnormal movements.    Normal casual gait     Pertinent Investigations:          8/30/2022     8:26 AM 7/31/2024     7:02 AM   HIT-6   When you have headaches, how often is the pain severe 13 10   How often do headaches limit your ability to do usual daily activities including household work, work, school, or social activities? 13 11   When you have a headache, how often do you wish you could lie down? 13 13   In the past 4 weeks, how often have you felt too tired to do work or daily activities because of your headaches 10 10   In the past 4 weeks, how often have you felt fed up or irritated because of your headaches 10 10   In the past 4 weeks, how often did headaches limit your ability to concentrate on work or daily activities 10 10   HIT-6 Total Score 69 64           8/30/2022     8:30 AM 7/31/2024     7:04 AM   MIDAS - in the past three months:   On how many days did you miss work or school because of your headaches? 12 1   How many days was your productivity at work or school reduced by half or more because of  your headaches? 2 3   On how many days did you not do household work because of your headaches? 48 2   How many days was your productivity in household work reduced by half or more because of your headaches? 48 1   On how many days did you miss family, social, or leisure activities because of your headaches? 2 2   On how many days did you have a headache? 48 4   On a scale of 0-10, on average how painful were these headaches? 8 6   MIDAS Score 112 (IV - Severe Disability) 9 (II - Mild Disability)

## 2024-07-31 NOTE — PATIENT INSTRUCTIONS
Acute Treatment:  -For acute treatment of mild headache, take acetaminophen as needed. Do not exceed more than 14 days per month to avoid medication overuse.  -For acute treatment of moderate to severe headache, take zolmitriptan at the onset of headache, with a repeat dose in two hours if needed. Do not exceed more than 9 days per month to avoid medication overuse.  -For headache related nausea, or as a rescue medicine for headache, take ondansetron as needed.     Preventive Treatment:  Staying hydrated and not going hungry  Stress reduction   Keep sleep routine  May try vit B2 200-400 mg daily OTC if tolerated and may add magnesium 400 mg daily or at bedtime OTC if tolerated -may cause GI symptoms-bloating, constipation or diarrhea   Monitor headaches     Follow up at least once a year or sooner if needed

## 2024-07-31 NOTE — LETTER
7/31/2024       RE: Kirby Patel  [address]  [facility] [address]     Dear Colleague,    Thank you for referring your patient, Kirby Patel, to the [facility] NEUROLOGY CLIN[facility] [place] at [facility]. Please see a copy of my visit note below.    [facility]    Headache Neurology Progress Note  July 31, 2024      Assessment/Plan:   Kirby Patel is a 39 year old   Migraine chronic  Acute Treatment:  -For acute treatment of mild headache, take acetaminophen as needed. Do not exceed more than 14 days per month to avoid medication overuse.  -For acute treatment of moderate to severe headache, take zolmitriptan at the onset of headache, with a repeat dose in two hours if needed. Do not exceed more than 9 days per month to avoid medication overuse.  -For headache related nausea, or as a rescue medicine for headache, take ondansetron as needed.     Preventive Treatment:  Staying hydrated and not going hungry  Stress reduction   Keep sleep routine  May try vit B2 200-400 mg daily OTC if tolerated and may add magnesium 400 mg daily or at bedtime OTC if tolerated -may cause GI symptoms-bloating, constipation or diarrhea   Monitor headaches for frequency, severity and need for rescue treatment     Follow up at least once a year or sooner if needed     The longitudinal plan of care for Kirby was addressed during this visit. Due to the added complexity in care, I will continue to support Kirby in the subsequent management of headache and with the ongoing continuity of care of this condition(s).    All of patient's questions were answered from the best of my knowledge.  Patient is in agreement with the plan.     20 minutes spent on the date of the encounter doing face to face visit, chart  review, results review,  meds review, treatment plan, documentation and further activities as noted above    CHILANGO Martini, CNP  "[facility]        Subjective:    Kirby Patel returns for follow up of headaches   Verapamil run out 2 weeks and headaches slowly coming back. Was fine for a first couple days or slight \"aura of headache\". Now had 2 migraines -had to cancel a meeting and take a medication.   Uses zolmitriptan as needed. In the last 3 month took it about 3 times for migraines. No side effects with zolmitriptan.   Stopped ibuprofen use due to hemorrhoidectomy. Uses acetaminophen for headaches and had to use it twice in 2 weeks. Takes 2-3 hours for headache to improve with acetaminophen. Drinks water and eats banana, yogurt and peanuts.   Does stretching and started walking more   Takes time away from computer and started painting  Uses CPAP diligently   Depression worsen post procedure and much better after seeing mental health provider an an emergency counseling.   Objective:   Vitals: /78 (BP Location: Right arm, Patient Position: Sitting, Cuff Size: Adult Regular)   Pulse 79   Wt 84.4 kg (186 lb)   SpO2 98%   BMI 31.93 kg/m    General: Cooperative, NAD  Neurologic:  Mental Status: Fully alert, attentive and oriented. Speech clear and fluent.   Cranial Nerves: Facial movements symmetric.   Motor: No abnormal movements.    Normal casual gait     Pertinent Investigations:          8/30/2022     8:26 AM 7/31/2024     7:02 AM   HIT-6   When you have headaches, how often is the pain severe 13 10   How often do headaches limit your ability to do usual daily activities including household work, work, school, or social activities? 13 11   When you have a headache, how often do you wish you could lie down? 13 13   In the past 4 weeks, how often have you felt too tired to do work or daily activities because of your headaches 10 10   In the past 4 weeks, how often have you felt fed up or irritated because of your headaches 10 10   In the past 4 weeks, how often did headaches limit your ability to concentrate " on work or daily activities 10 10   HIT-6 Total Score 69 64           8/30/2022     8:30 AM 7/31/2024     7:04 AM   MIDAS - in the past three months:   On how many days did you miss work or school because of your headaches? 12 1   How many days was your productivity at work or school reduced by half or more because of your headaches? 2 3   On how many days did you not do household work because of your headaches? 48 2   How many days was your productivity in household work reduced by half or more because of your headaches? 48 1   On how many days did you miss family, social, or leisure activities because of your headaches? 2 2   On how many days did you have a headache? 48 4   On a scale of 0-10, on average how painful were these headaches? 8 6   MIDAS Score 112 (IV - Severe Disability) 9 (II - Mild Disability)          Again, thank you for allowing me to participate in the care of your patient.      Sincerely,    CHILANGO Cordova CNP

## 2024-08-08 ENCOUNTER — TELEPHONE (OUTPATIENT)
Dept: NEUROLOGY | Facility: CLINIC | Age: 39
End: 2024-08-08
Payer: COMMERCIAL

## 2024-08-08 NOTE — TELEPHONE ENCOUNTER
Left Voicemail (1st Attempt) and Sent Mychart (1st Attempt) for the patient to call back and schedule the following:    Appointment type: Return Headache  Provider: Gricel KEE CNP  Return date: 7/2025  Specialty phone number: [phone]  Additional appointment(s) needed: NA  Additonal Notes: 1yr follow up    Ofelia Gonzales on 8/8/2024 at 3:39 PM

## 2024-08-12 NOTE — TELEPHONE ENCOUNTER
Left Voicemail (2nd Attempt) for the patient to call back and schedule the following:    Appointment type: Return Headache   Provider: Gricel Childers CNP  Return date: 7/31/2025  Specialty phone number: [phone]  Additional appointment(s) needed: n/a  Additonal Notes: WQ appointment request       Micaela Coronel on 8/12/2024 at 1:17 PM

## 2024-10-22 ENCOUNTER — VIRTUAL VISIT (OUTPATIENT)
Dept: GASTROENTEROLOGY | Facility: CLINIC | Age: 39
End: 2024-10-22
Attending: PHYSICIAN ASSISTANT
Payer: COMMERCIAL

## 2024-10-22 DIAGNOSIS — K21.9 GASTROESOPHAGEAL REFLUX DISEASE WITHOUT ESOPHAGITIS: ICD-10-CM

## 2024-10-22 DIAGNOSIS — K62.5 BRBPR (BRIGHT RED BLOOD PER RECTUM): ICD-10-CM

## 2024-10-22 DIAGNOSIS — R79.89 ELEVATED LFTS: ICD-10-CM

## 2024-10-22 PROCEDURE — 99214 OFFICE O/P EST MOD 30 MIN: CPT | Mod: 95 | Performed by: PHYSICIAN ASSISTANT

## 2024-10-22 NOTE — NURSING NOTE
Current patient location: [address] [place] [id number]    Is the patient currently in the state of [place]? YES    Visit mode:VIDEO    If the visit is dropped, the patient can be reconnected by: VIDEO VISIT: Send to e-mail at: [email or web address]    Will anyone else be joining the visit? NO  (If patient encounters technical issues they should call [phone]    Are changes needed to the allergy or medication list? No    Are refills needed on medications prescribed by this physician? NO    Rooming Documentation:  Questionnaire(s) completed    Reason for visit: RECHECK    Gerard SINGH

## 2024-10-22 NOTE — LETTER
10/22/2024      Kirby Patel  [address]  [facility] [address]      Dear Colleague,    Thank you for referring your patient, Kirby Patel, to the [facility] [place]. Please see a copy of my visit note below.    Virtual Visit Details    Type of service:  Video Visit     Originating Location (pt. Location): Home    Distant Location (provider location):  Off-site  Platform used for Video Visit: [facility]    Gastroenterology Visit for: Kirby Patel 1985   MRN: [id number]     Reason for Visit:  chief complaint    Referred by: Self  / No address on file  Patient Care Team:  [facility], [facility] as PCP - General (Clinic)  Spencer Barraza MD as MD (Gastroenterology)  Branden Melendrez MD as Referring Physician (Pediatrics)  Roderick Hargrove MD as MD (Neurology)  Rico Burciaga PA-C as Physician Assistant (Physician Assistant)  Patel Smith MD as MD (Cardiology)  Gricel Childers APRN CNP as Nurse Practitioner (Neurology)  Ivana Muñoz PA-C (Inactive) as Physician Assistant (Gastroenterology)  Caty Garland RD as Registered Dietitian (Dietitian, Registered)  Bloch, Lauren Turner, [facility] as Pharmacist (Pharmacist)  Bindu Matias PA-C as Physician Assistant (Gastroenterology)  Nisha Hartmann APRN CNP as Nurse Practitioner (Colon & Rectal)  Bindu Matias PA-C as Assigned Cancer Care Provider  Sol Vo PA-C as Assigned Gastroenterology Provider  Ronen Glaser MD as MD (Colon & Rectal)  Yomaira Sanchez PA-C as Assigned Surgical Provider  Gricel Childers APRN CNP as Assigned Neuroscience Provider    History of Present Illness:   Kirby Patel is a 39 year old male with significant past medical history pertinent for anxiety, depression, DIEGO, migraine headaches who is presenting as a follow up patient however as a new patient to myself with  "a chief complaint of reflux and BRBPR.    -----------------------------------------------------------------------------------------------------------------------------------------------------------------------------------------------------------------------------------  Interval History October 22, 2024:    Had significant rectal pain after the procedure. Noting that \"it was so painful that I had suicidal thoughts.\" Had an emergency call with provider at [facility]. Explains that his psych nurse was very understanding. Reports allergy to the pain medications. ~ 5 weeks after the surgical intervention has had gradually began to \"feel better.\"     Rectal Bleeding - Onset ~4 weeks ago had reoccurrence of rectal bleeding occurring daily with defecation and independently of defecation. When noted during defecation it is mixed within the stool and within the water in the toilet bowl. When the rectal bleeding is occurring independently of defecation notes smears of red blood with mucus on the toilet paper. Explaining he has a \"wet fart\" which prompts him to the wipe.     Bowel Patterns - Now has increased water intake, continues taking fiber supplementation and has purchased a a squatty potty. In regards to his defecatory patterns he is stooling 2-4x per day. Stools are consistent with [place] Stool Scale Type 4.     Reflux - Currently well-controlled with omeprazole 40 mg once daily and famotidine 20 mg twice daily.  Denies dysphagia/odynophagia.    Recently bought a walking pad to go under his desk    Last year of PHD at the [employer].     -----------------------------------------------------------------------------------------------------------------------------------------------------------------------------------------------------    Interval History April 16, 2024:    Reflux - Omeprazole 40 mg once daily and Famotidine 20 mg twice daily without break through symptoms of heartburn or regurgitation. Will miss doses of " the Famotidine. Noting if he avoid triggers food and consume enough water the reflux would be well controlled without the Famotidine.      Bowel Patterns - Currently he is stool 3-4 times per day consistent with [place] Stool Scale Type 1-3. 3 weeks of BRBPR which resolved this past Saturday. Had cessation this Saturday. Now using Preparation H once daily x3 days with symptomatic improvement. Currently taking two teaspoons twice daily of Benefiber. No additional laxative use     Weight - 205 lbs --> 182 lbs. Newer exercise equipment at home     Denies unintentional weight loss, nausea, emesis, dysphagia, odynophagia, heartburn, regurgitation, abdominal pain, diarrhea, constipation (< 3 stools per week) and melena.     -----------------------------------------------------------------------------------------------------------------------------------------------------------------------------------------------------------------------------------    Interval History July 27, 2023:    Current acid suppressive medication includes Omeprazole in the morning on an empty stomach and Famotidine 20 mg twice daily prior to meals. Continues to have indigestion later in the evening/prior to bed is relieved with the use of TUMs. Denies heartburn/regurgitation.      Currently Kirby is having bowel movement 2-3 times daily consistent with [place] Stool Scale Type 4.  Since his last office visit he was evaluated by the colorectal team and advised to increase daily fiber supplementation/daily water intake.  With increase in fiber has noted less frequent red blood per rectum. With more agressive wiping has noted red blood on the toilet paper.     Denies weight loss, nausea, emesis, dysphagia, odynophagia, heartburn, regurgitation, abdominal pain, diarrhea, constipation (< 3 stools per week), melena and hematochezia.     No family history or GI related malignancy (esophageal, gastric, pancreatic, liver or colon) or family history of  IBD/celiac disease.     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      ---------------------------------------------------------------------------------------------------------------------------------------------------------  4/12/2023 SOURAV Matias PA-C:     37 year old male with PMH of anxiety, depression, DIEGO, migraine headaches, presenting to GI clinic for follow-up.     Kirby has previously established in our clinic with Dr. Ofelia Milligan and Ivana Muñoz PA-C. Briefly, he was initially evaluated by a gastroenterologist in [place], where he was diagnosed with irritable bowel syndrome and GERD. He was later seen at [facility] between 2015 and 2016, at which time he had an EGD and colonoscopy for blood in stools. Per patient, this was unremarkable. At time of consultations at our clinic, he reported a variable stool pattern with loose stools alternating with constipation. He underwent EGD 1/23/2020 with normal esophagus, stomach with erythema and a few erosions in the gastric antrum, with biopsies suggestive of reactive gastropathy, without evidence of H pylori. Duodenal biopsies consistent with peptic duodenitis. Patient underwent colonoscopy 1/18/2021 without source of symptoms. He had previously been given a course of Rifaximin for symptoms, with initial course offering symptom improvement.     At most recent follow-up January 2022, he noted bloating and constipation, followed by diarrhea. He was recommended to start magnesium 400 mg daily. In regard to GERD, he was recommended to continue omeprazole.     Today, Kirby notes he has lost weight; he notes that around the Fall of  Last year, he was formaly diagnosed and placed on ADHD medications and started to lose weight following this. His partner started a weight loss journey around that time, and this has changed Kirby's eating habits as well, noting decreased intake of take-out  (previously daily), and decreased volume of intake. He estimates with these changes, he has lost 25 pounds intentionally. He notes that this has helped the heartburn and reflux significantly. He uses famotidine daily to as needed, which helps. Remains on Prilosec.     However, he continues to have periods of time where he notes he has BRBPR with bowel movements. He is trying to strain less and spend less time on the toilet. He notes there was a time where he was taking fiber, and instead of helping with the stools, he notes he had increased bloating and constipated. Currently, he notes he has ongoing variable bowel habits. He notes the bloating has been improved by cutting out soda. Coffee can augment bloating as well. He uses Gas-X if needed. He is having bowel movements ranging from once daily, which then is followed by what he describes as a release, with 5-6 clustered bowel movements that day. He estimates that this has happened 3-4 times in the last few months. Stools can often be hard and dry, associated with straining and a sense of incomplete evacuation. He is not currently using magnesium.        Esophageal Questionnaire(s)    BEDQ Questionnaire      4/16/2024     7:50 AM 10/22/2024     3:08 PM   BEDQ Questionnaire: How Often Have You Had the Following?   Trouble eating solid food (meat, bread, vegetables) 0 0   Trouble eating soft foods (yogurt, jello, pudding) 0 0   Trouble swallowing liquids 0 0   Pain while swallowing 0 0   Coughing or choking while swallowing foods or liquids 0 0   Total Score: 0 0         4/16/2024     7:50 AM 10/22/2024     3:08 PM   BEDQ Questionnaire: Discomfort/Pain Ratings   Eating solid food (meat, bread, vegetables) 0 0   Eating soft foods (yogurt, jello, pudding) 0 0   Drinking liquid 0 0   Total Score: 0 0       Eckardt Questionnaire      4/16/2024     7:51 AM 10/22/2024     3:09 PM   Eckardt Questionnaire   Dysphagia 0 0   Regurgitation 0 1   Retrosternal Pain 0 1   Weight  Loss (kg) 2 0   Total Score:  2 2       Promis 10 Questionnaire      4/16/2024     7:52 AM 10/22/2024     3:10 PM   PROMIS 10 FLOWSHEET DATA   In general, would you say your health is: 2 2   In general, would you say your quality of life is: 2 2   In general, how would you rate your physical health? 1 2   In general, how would you rate your mental health, including your mood and your ability to think? 3 3   In general, how would you rate your satisfaction with your social activities and relationships? 3 3   In general, please rate how well you carry out your usual social activities and roles. (This includes activities at home, at work and in your community, and responsibilities as a parent, child, spouse, employee, friend, etc.) 3 3   To what extent are you able to carry out your everyday physical activities such as walking, climbing stairs, carrying groceries, or moving a chair? 3 3   In the past 7 days, how often have you been bothered by emotional problems such as feeling anxious, depressed, or irritable? 3 3   In the past 7 days, how would you rate your fatigue on average? 5 3   In the past 7 days, how would you rate your pain on average, where 0 means no pain, and 10 means worst imaginable pain? 3 2   Mental health question re-calculation - no clinical value 3 3   Physical health question re-calculation - no clinical value 1 3   Pain question re-calculation - no clinical value 4 4   Global Mental Health Score 11 11   Global Physical Health Score 9 12   PROMIS TOTAL - SUBSCORES 20 23           STUDIES & PROCEDURES:    EGD:     1/2020 [facility]     FINAL DIAGNOSIS:   A. DUODENAL BIOPSY:   - Duodenal mucosa with foveolar metaplasia consistent with peptic   duodenitis     B. GASTRIC BIOPSY:   - Mild chronic gastritis   - Features of reactive gastropathy   - No H. pylori like organisms identified on routine staining   - Negative for intestinal metaplasia or dysplasia     Colonoscopy:    1/8/2021  Findings:       The  terminal ileum appeared normal. Biopsies were taken with a cold        forceps for histology.        The entire examined colon appeared normal.        Biopsies for histology were taken with a cold forceps from the right        colon, left colon and rectum for evaluation of microscopic colitis.     Impression:          - The examined portion of the ileum was normal. Biopsied.                        - The entire examined colon is normal.                        - Biopsies were taken with a cold forceps from the right                        colon, left colon and rectum for evaluation of                        microscopic colitis.                        - Retroflexion was not performed as the rectal vault was                        small and patient would not tolerate retroflexion.     FINAL DIAGNOSIS:   A. TERMINAL ILEUM, BIOPSY:   - Ileal mucosa with no significant histologic abnormality   - No evidence of inflammation     B. RIGHT COLON, BIOPSY:   - Colonic mucosa with no significant histologic abnormality   - No evidence of inflammation     C. LEFT COLON, BIOPSY:   - Colonic mucosa with no significant histologic abnormality   - No evidence of inflammation     D. RECTUM, BIOPSY:   - Rectal mucosa with no significant histologic abnormality   - No evidence of inflammation      EndoFLIP directed at the UES or LES (8cm (EF-325) balloon length or 16cm (EF-322) balloon length):   Date:  8cm balloon  Balloon inflation Balloon pressure CSA (mm^2) DI (mm^2/mmHg) Dmin (mm) Compliance   20 (ladmark ID)        30        40        50           16cm balloon  Balloon inflation Balloon pressure CSA (mm^2) DI (mm^2/mmHg) Dmin (mm) Compliance   30 (ladmark ID)        40        50        60        70           High Resolution Manometry:    PH/Impedance:     Bravo:    CT:    Esophagram:    FL VSS:     GES:    U/S:     7/15/2019  FINDINGS:   The liver has a normal echotexture with no focal abnormality.  Visualized portions of the pancreas  are normal. The spleen is normal  in size at 9.8 cm. The gallbladder is normal. Sonographic 's  sign is negative. There are no gallstones. Common duct measures 1 mm.  The aorta and IVC are normal. The right kidney measures 11.2 cm. The  left kidney measures 10.9 cm. There is no hydronephrosis.                                                               IMPRESSION:      Normal abdominal ultrasound.       XRAY:    Other:       Prior medical records were reviewed including, but not limited to, notes from referring providers, lab work, radiographic tests, and other diagnostic tests. Pertinent results were summarized above.     History     Past Medical History:   Diagnosis Date     Anxiety      Depressive disorder      Intractable vomiting with nausea, unspecified vomiting type 11/13/2016     Kidney stone on right side 11/13/2016     DIEGO (obstructive sleep apnea)- severe (AHI 35) 3/24/2022       Past Surgical History:   Procedure Laterality Date     COLONOSCOPY       COLONOSCOPY N/A 1/18/2021    Procedure: COLONOSCOPY, WITH  BIOPSY;  Surgeon: Spencer Barraza MD;  Location: [facility] OR       Social History     Socioeconomic History     Marital status: Single     Spouse name: Not on file     Number of children: 0     Years of education: Not on file     Highest education level: Not on file   Occupational History     Occupation: grad student   Tobacco Use     Smoking status: Never     Smokeless tobacco: Never   Vaping Use     Vaping status: Never Used   Substance and Sexual Activity     Alcohol use: Not Currently     Drug use: No     Sexual activity: Yes     Partners: Male   Other Topics Concern     Not on file   Social History Narrative     Not on file     Social Determinants of Health     Financial Resource Strain: Not on file   Food Insecurity: Not on file   Transportation Needs: Not on file   Physical Activity: Not on file   Stress: Not on file   Social Connections: Not on file   Interpersonal Safety: Not  on file   Housing Stability: Not on file       Family History   Problem Relation Age of Onset     Diabetes Mother      Obesity Mother      Hyperlipidemia Mother      Uterine Cancer Maternal Grandmother      Anesthesia Reaction No family hx of      Thrombosis No family hx of      Family history reviewed and edited as appropriate    Medications and Allergies:     Outpatient Encounter Medications as of 10/22/2024   Medication Sig Dispense Refill     acetaminophen (TYLENOL) 500 MG tablet Take 2 tablets (1,000 mg) by mouth 4 times daily 30 tablet 1     albuterol (PROAIR HFA/PROVENTIL HFA/VENTOLIN HFA) 108 (90 Base) MCG/ACT inhaler Inhale 1 puff into the lungs as needed       amphetamine-dextroamphetamine (ADDERALL XR) 10 MG 24 hr capsule Take 10 mg by mouth 3 times daily       escitalopram (LEXAPRO) 20 MG tablet Take 1 tablet by mouth every morning       famotidine (PEPCID) 20 MG tablet Take 1 tablet (20 mg) by mouth 2 times daily 180 tablet 3     LORazepam (ATIVAN) 0.5 MG tablet Take 0.5 mg by mouth every 6 hours as needed       menthol-zinc oxide (CALMOSEPTINE) 0.44-20.6 % OINT ointment Apply topically 4 times daily as needed for skin protection Apply thick layer to perianal skin 3-4 times daily for skin irritation. 113 g 3     omeprazole (PRILOSEC) 40 MG DR capsule Take 1 capsule (40 mg) by mouth daily 90 capsule 2     ondansetron (ZOFRAN ODT) 4 MG ODT tab Take 1 tablet (4 mg) by mouth every 8 hours as needed for nausea 20 tablet 0     ZOLMitriptan (ZOMIG) 2.5 MG tablet Take 1 tablet (2.5 mg) by mouth at onset of headache for migraine May repeat in 2 hours. Max 4 tablets/24 hours. 18 tablet 9     No facility-administered encounter medications on file as of 10/22/2024.        Allergies   Allergen Reactions     Codeine Anaphylaxis     Hydromorphone Nausea and Vomiting     Zofran DOES NOT HELP, only compounds problem, PER PT.      Oxycodone Other (See Comments)     Vomitting          Review of systems:  A full 10 point  review of systems was obtained and was negative except for the pertinent positives and negatives stated within the HPI.    Objective Findings:   Physical Exam:    Constitutional: There were no vitals taken for this visit.  General: Alert, cooperative, no distress, well-appearing  Head: Atraumatic, normocephalic, no obvious abnormalities   Eyes: Sclera anicteric, no obvious conjunctival hemorrhage   Nose: Nares normal, no obvious malformation, no obvious rhinorrhea   Respiratory: Resting comfortably, no apparent distress, no cough.   Skin: No jaundice, no obvious rash  Neurologic: AAOx3, no obvious neurologic abnormality  Psychiatric: Normal Affect, appropriate mood  Extremities: No obvious edema, no obvious malformation     Labs, Radiology, Pathology     Lab Results   Component Value Date    WBC 6.8 04/26/2022    WBC 7.8 11/27/2018    WBC 7.7 09/02/2017    HGB 12.2 (L) 04/24/2024    HGB 12.9 (L) 04/26/2022    HGB 13.6 11/27/2018     04/26/2022     11/27/2018     09/02/2017    TRIG 113 06/29/2022    ALT 46 04/24/2024    ALT 78 (H) 04/26/2022    ALT 78 (H) 11/27/2018    AST 24 04/24/2024    AST 43 04/26/2022    AST 33 11/27/2018     06/20/2024     04/26/2022     11/27/2018    BUN 15.9 06/20/2024    BUN 17 04/26/2022    BUN 18 11/27/2018    CO2 24 06/20/2024    CO2 25 04/26/2022    CO2 25 11/27/2018    TSH 1.71 04/24/2024    TSH 0.79 12/11/2016    INR 1.04 12/11/2016        Liver Function Studies -   Recent Labs   Lab Test 04/26/22  1111   PROTTOTAL 7.7   ALBUMIN 3.6   BILITOTAL 0.3   ALKPHOS 158*   AST 43   ALT 78*          Patient Active Problem List    Diagnosis Date Noted     Esophageal reflux 03/24/2022     Priority: Medium     Depressive disorder 03/24/2022     Priority: Medium     Anxiety 03/24/2022     Priority: Medium     DIEGO (obstructive sleep apnea)- severe (AHI 35) 03/24/2022     Priority: Medium     3/23/2022 [place] [facility] Sleep Study (-) - AHI 35.3, RDI 38.3,  Supine AHI 69.1, Lateral AHI 5, REM AHI 1.7, Low O2 78.0%, Time Spent <=88% 20.0 minutes / Time Spent <=89% 28.2 minutes.       Class 1 obesity due to excess calories with serious comorbidity and body mass index (BMI) of 32.0 to 32.9 in adult 03/24/2022     Priority: Low      Assessment and Plan   Assessment/Plan:    Kirby Patel is a 39 year old male with significant past medical history pertinent for anxiety, depression, DIEGO, migraine headaches who is presenting as a follow up patient however as a new patient to myself with a chief complaint of reflux and BRBPR.    #BRBPR   Colonoscopy in 2021 that was unremarkable, suspected to have outlet bleeding.  Patient was seen by colorectal surgery team 7/12/2023 with findings notable for internal hemorrhoids.  Options of hemorrhoid banding versus conservative management were discussed.  Despite increase indaily fiber supplementation and water intake Kirby continued to be symptomatic.    6/21/2024 underwent hemorrhoidectomy.  Patient was seen postoperatively 7/12/2024 by the colorectal surgery team and recommend initiation of daily fiber supplementation as well as a follow-up as needed    Today Kirby reports daily rectal bleeding occurring with defecation and independently of defecation onset approximately 4 weeks prior.  He has yet to follow-up with his colorectal surgery team.  He does endorse regular bowel movements with 2-4 stools daily consistent with [place] stool scale 4 which is an improvement from prior.    - Follow-up with your colorectal surgery team today  - Continued use of your squatty potty  - Emphasis placed on the importance of hydration especially in the setting of daily fiber supplementation  - Continue daily fiber supplementation with a goal of increasing this to 2-3 tablespoons per day.  Additional information/recommendations provided in the AVS    #Possible Lactose Intolerance    - As needed use of Lactaid, future considerations would be to attempt  a 2 to 4-week dairy free trial     #GERD  1/2020 EGD with findings notable for reactive gastropathy and peptic duodenitis, without erosive esophagitis.  Biopsies negative for H. pylori, intestinal metaplasia or dysplasia. Duodenal biopsies with foveolar metaplasia consistent with peptic duodenitis.      Overall symptoms have improved with current acid suppressive regimen as well as a focus on healthy dietary habits and intentional weight loss. Denies breakthrough symptoms of heartburn/regurgitation and symptoms of dysphagia/odynophagia.    Discussed the new updates in the Dean criteria. As Kirby has had difficulties with attempted de-escalation of acid suppressive therapy would then consider objective pH monitoring in the future.  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    - Continue Omeprazole to 40 mg once daily, best taken on an empty stomach at least 30 - 60 minutes prior to the first meal of the day   - Continue Famotidine 20 mg twice daily once in the morning and once in the evening.   - Reflux friendly lifestyle modifications as directed within AVS    #Elevated ALP --> Resolved --> Fractionated as Bone Origin  #Elevated ALT --> Resolved    Chronic mild elevation in ALT <2x ULN (7+ years) with new mild elevation in alkaline phosphatase <2x ULN. Fractionated ALP was of bone origin. CLD panel negative. Ultrasound of the abdomen 2019 with normal echotexture of the liver without focal abnormality.  Both of which since resolved in the setting of intentional weight loss raising concern for possible MASLD.    - Follow-up with your primary care provider form ALP of bone origin.  Emphasized the importance of bone health including high impact physical activity and weight lifting as well as the appropriate supplementation with calcium/vitamin D especially in light of possible lactose intolerance.    #Chronic  Normocytic Anemia   Previous attempts at daily iron supplementation led to significant constipation.  Source is likely secondary to chronic rectal bleeding and nutritional sources at the diet with minimal iron    - Labs to be completed next best convenience  - Please attempt to increase your intake of iron rich meats and search for meat alternatives that high in iron examples would be leafy green vegetables such as spinach, kale, collards, chard as well as  broccoli, beans and lentils  - Future considerations would be consultation to on staff dietitian    Follow up plan:   Return to clinic 4 months and as needed.    The risks and benefits of my recommendations, as well as other treatment options were discussed with the patient and any available family today. All questions were answered.     Follow up: As planned above. Today, I personally spent 24 minutes in direct face to face time with the patient, of which greater than 50% of the time was spent in patient education and counseling as described above. Approximately 6 minutes were spent on indirect care associated with the patient's consultation including but not limited to review of: patient medical records to date, clinic visits, hospital records, lab results, imaging studies, procedural documentation, and coordinating care with other providers. The findings from this review are summarized in the above note. All of the above accounted for a cumulative time of 30 minutes and was performed on the date of service.     The patient verbalized understanding of the plan and was appreciative for the time spent and information provided during the office visit.           Sol Vo PA-C  Division of Gastroenterology, Hepatology, and Nutrition  [facility]       Documentation assisted by voice recognition and documentation system.            Again, thank you for allowing me to participate in the care of your patient.        Sincerely,        oSl Vo,  MARILUZ

## 2024-10-22 NOTE — PROGRESS NOTES
Virtual Visit Details    Type of service:  Video Visit     Originating Location (pt. Location): Home    Distant Location (provider location):  Off-site  Platform used for Video Visit: [facility]    Gastroenterology Visit for: Kirby Patel 1985   MRN: [id number]     Reason for Visit:  chief complaint    Referred by: Self  / No address on file  Patient Care Team:  Service, [facility] as PCP - General (Clinic)  Spencer Barraza MD as MD (Gastroenterology)  Branden Melendrez MD as Referring Physician (Pediatrics)  Roderick Hargrove MD as MD (Neurology)  Rico Burciaga PA-C as Physician Assistant (Physician Assistant)  Patel Smith MD as MD (Cardiology)  Gricel Childers APRN CNP as Nurse Practitioner (Neurology)  Ivana Muñoz PA-C (Inactive) as Physician Assistant (Gastroenterology)  Caty Garland RD as Registered Dietitian (Dietitian, Registered)  Bloch, Lauren Turner, [facility] as Pharmacist (Pharmacist)  Bindu Matias PA-C as Physician Assistant (Gastroenterology)  Nisha Hartmann APRN CNP as Nurse Practitioner (Colon & Rectal)  Bindu Matias PA-C as Assigned Cancer Care Provider  Sol Vo PA-C as Assigned Gastroenterology Provider  Ronen Glaser MD as MD (Colon & Rectal)  Yomaira Sanchez PA-C as Assigned Surgical Provider  Gricel Childers APRN CNP as Assigned Neuroscience Provider    History of Present Illness:   Kirby Patel is a 39 year old male with significant past medical history pertinent for anxiety, depression, DIEGO, migraine headaches who is presenting as a follow up patient however as a new patient to myself with a chief complaint of reflux and  "BRBPR.    -----------------------------------------------------------------------------------------------------------------------------------------------------------------------------------------------------------------------------------  Interval History October 22, 2024:    Had significant rectal pain after the procedure. Noting that \"it was so painful that I had suicidal thoughts.\" Had an emergency call with provider at [place]. Explains that his psych nurse was very understanding. Reports allergy to the pain medications. ~ 5 weeks after the surgical intervention has had gradually began to \"feel better.\"     Rectal Bleeding - Onset ~4 weeks ago had reoccurrence of rectal bleeding occurring daily with defecation and independently of defecation. When noted during defecation it is mixed within the stool and within the water in the toilet bowl. When the rectal bleeding is occurring independently of defecation notes smears of red blood with mucus on the toilet paper. Explaining he has a \"wet fart\" which prompts him to the wipe.     Bowel Patterns - Now has increased water intake, continues taking fiber supplementation and has purchased a a squatty potty. In regards to his defecatory patterns he is stooling 2-4x per day. Stools are consistent with [place] Stool Scale Type 4.     Reflux - Currently well-controlled with omeprazole 40 mg once daily and famotidine 20 mg twice daily.  Denies dysphagia/odynophagia.    Recently bought a walking pad to go under his desk    Last year of PHD at the [employer].     -----------------------------------------------------------------------------------------------------------------------------------------------------------------------------------------------------    Interval History April 16, 2024:    Reflux - Omeprazole 40 mg once daily and Famotidine 20 mg twice daily without break through symptoms of heartburn or regurgitation. Will miss doses of the Famotidine. Noting if he avoid " triggers food and consume enough water the reflux would be well controlled without the Famotidine.      Bowel Patterns - Currently he is stool 3-4 times per day consistent with [place] Stool Scale Type 1-3. 3 weeks of BRBPR which resolved this past Saturday. Had cessation this Saturday. Now using Preparation H once daily x3 days with symptomatic improvement. Currently taking two teaspoons twice daily of Benefiber. No additional laxative use     Weight - 205 lbs --> 182 lbs. Newer exercise equipment at home     Denies unintentional weight loss, nausea, emesis, dysphagia, odynophagia, heartburn, regurgitation, abdominal pain, diarrhea, constipation (< 3 stools per week) and melena.     -----------------------------------------------------------------------------------------------------------------------------------------------------------------------------------------------------------------------------------    Interval History July 27, 2023:    Current acid suppressive medication includes Omeprazole in the morning on an empty stomach and Famotidine 20 mg twice daily prior to meals. Continues to have indigestion later in the evening/prior to bed is relieved with the use of TUMs. Denies heartburn/regurgitation.      Currently Kirby is having bowel movement 2-3 times daily consistent with [place] Stool Scale Type 4.  Since his last office visit he was evaluated by the colorectal team and advised to increase daily fiber supplementation/daily water intake.  With increase in fiber has noted less frequent red blood per rectum. With more agressive wiping has noted red blood on the toilet paper.     Denies weight loss, nausea, emesis, dysphagia, odynophagia, heartburn, regurgitation, abdominal pain, diarrhea, constipation (< 3 stools per week), melena and hematochezia.     No family history or GI related malignancy (esophageal, gastric, pancreatic, liver or colon) or family history of IBD/celiac disease.     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      ---------------------------------------------------------------------------------------------------------------------------------------------------------  4/12/2023 SOURAV Matias PA-C:     37 year old male with PMH of anxiety, depression, DIEGO, migraine headaches, presenting to GI clinic for follow-up.     Kirby has previously established in our clinic with Dr. Ofelia Milligan and Ivana Muñoz PA-C. Briefly, he was initially evaluated by a gastroenterologist in [place], where he was diagnosed with irritable bowel syndrome and GERD. He was later seen at [facility] between 2015 and 2016, at which time he had an EGD and colonoscopy for blood in stools. Per patient, this was unremarkable. At time of consultations at our clinic, he reported a variable stool pattern with loose stools alternating with constipation. He underwent EGD 1/23/2020 with normal esophagus, stomach with erythema and a few erosions in the gastric antrum, with biopsies suggestive of reactive gastropathy, without evidence of H pylori. Duodenal biopsies consistent with peptic duodenitis. Patient underwent colonoscopy 1/18/2021 without source of symptoms. He had previously been given a course of Rifaximin for symptoms, with initial course offering symptom improvement.     At most recent follow-up January 2022, he noted bloating and constipation, followed by diarrhea. He was recommended to start magnesium 400 mg daily. In regard to GERD, he was recommended to continue omeprazole.     Today, Kirby notes he has lost weight; he notes that around the Fall of  Last year, he was formaly diagnosed and placed on ADHD medications and started to lose weight following this. His partner started a weight loss journey around that time, and this has changed Kirby's eating habits as well, noting decreased intake of take-out (previously daily), and decreased volume of  intake. He estimates with these changes, he has lost 25 pounds intentionally. He notes that this has helped the heartburn and reflux significantly. He uses famotidine daily to as needed, which helps. Remains on Prilosec.     However, he continues to have periods of time where he notes he has BRBPR with bowel movements. He is trying to strain less and spend less time on the toilet. He notes there was a time where he was taking fiber, and instead of helping with the stools, he notes he had increased bloating and constipated. Currently, he notes he has ongoing variable bowel habits. He notes the bloating has been improved by cutting out soda. Coffee can augment bloating as well. He uses Gas-X if needed. He is having bowel movements ranging from once daily, which then is followed by what he describes as a release, with 5-6 clustered bowel movements that day. He estimates that this has happened 3-4 times in the last few months. Stools can often be hard and dry, associated with straining and a sense of incomplete evacuation. He is not currently using magnesium.        Esophageal Questionnaire(s)    BEDQ Questionnaire      4/16/2024     7:50 AM 10/22/2024     3:08 PM   BEDQ Questionnaire: How Often Have You Had the Following?   Trouble eating solid food (meat, bread, vegetables) 0 0   Trouble eating soft foods (yogurt, jello, pudding) 0 0   Trouble swallowing liquids 0 0   Pain while swallowing 0 0   Coughing or choking while swallowing foods or liquids 0 0   Total Score: 0 0         4/16/2024     7:50 AM 10/22/2024     3:08 PM   BEDQ Questionnaire: Discomfort/Pain Ratings   Eating solid food (meat, bread, vegetables) 0 0   Eating soft foods (yogurt, jello, pudding) 0 0   Drinking liquid 0 0   Total Score: 0 0       Eckardt Questionnaire      4/16/2024     7:51 AM 10/22/2024     3:09 PM   Eckardt Questionnaire   Dysphagia 0 0   Regurgitation 0 1   Retrosternal Pain 0 1   Weight Loss (kg) 2 0   Total Score:  2 2        Promis 10 Questionnaire      4/16/2024     7:52 AM 10/22/2024     3:10 PM   PROMIS 10 FLOWSHEET DATA   In general, would you say your health is: 2 2   In general, would you say your quality of life is: 2 2   In general, how would you rate your physical health? 1 2   In general, how would you rate your mental health, including your mood and your ability to think? 3 3   In general, how would you rate your satisfaction with your social activities and relationships? 3 3   In general, please rate how well you carry out your usual social activities and roles. (This includes activities at home, at work and in your community, and responsibilities as a parent, child, spouse, employee, friend, etc.) 3 3   To what extent are you able to carry out your everyday physical activities such as walking, climbing stairs, carrying groceries, or moving a chair? 3 3   In the past 7 days, how often have you been bothered by emotional problems such as feeling anxious, depressed, or irritable? 3 3   In the past 7 days, how would you rate your fatigue on average? 5 3   In the past 7 days, how would you rate your pain on average, where 0 means no pain, and 10 means worst imaginable pain? 3 2   Mental health question re-calculation - no clinical value 3 3   Physical health question re-calculation - no clinical value 1 3   Pain question re-calculation - no clinical value 4 4   Global Mental Health Score 11 11   Global Physical Health Score 9 12   PROMIS TOTAL - SUBSCORES 20 23           STUDIES & PROCEDURES:    EGD:     1/2020 [facility]     FINAL DIAGNOSIS:   A. DUODENAL BIOPSY:   - Duodenal mucosa with foveolar metaplasia consistent with peptic   duodenitis     B. GASTRIC BIOPSY:   - Mild chronic gastritis   - Features of reactive gastropathy   - No H. pylori like organisms identified on routine staining   - Negative for intestinal metaplasia or dysplasia     Colonoscopy:    1/8/2021  Findings:       The terminal ileum appeared normal.  Biopsies were taken with a cold        forceps for histology.        The entire examined colon appeared normal.        Biopsies for histology were taken with a cold forceps from the right        colon, left colon and rectum for evaluation of microscopic colitis.     Impression:          - The examined portion of the ileum was normal. Biopsied.                        - The entire examined colon is normal.                        - Biopsies were taken with a cold forceps from the right                        colon, left colon and rectum for evaluation of                        microscopic colitis.                        - Retroflexion was not performed as the rectal vault was                        small and patient would not tolerate retroflexion.     FINAL DIAGNOSIS:   A. TERMINAL ILEUM, BIOPSY:   - Ileal mucosa with no significant histologic abnormality   - No evidence of inflammation     B. RIGHT COLON, BIOPSY:   - Colonic mucosa with no significant histologic abnormality   - No evidence of inflammation     C. LEFT COLON, BIOPSY:   - Colonic mucosa with no significant histologic abnormality   - No evidence of inflammation     D. RECTUM, BIOPSY:   - Rectal mucosa with no significant histologic abnormality   - No evidence of inflammation      EndoFLIP directed at the UES or LES (8cm (EF-325) balloon length or 16cm (EF-322) balloon length):   Date:  8cm balloon  Balloon inflation Balloon pressure CSA (mm^2) DI (mm^2/mmHg) Dmin (mm) Compliance   20 (ladmark ID)        30        40        50           16cm balloon  Balloon inflation Balloon pressure CSA (mm^2) DI (mm^2/mmHg) Dmin (mm) Compliance   30 (ladmark ID)        40        50        60        70           High Resolution Manometry:    PH/Impedance:     Bravo:    CT:    Esophagram:    FL VSS:     GES:    U/S:     7/15/2019  FINDINGS:   The liver has a normal echotexture with no focal abnormality.  Visualized portions of the pancreas are normal. The spleen is  normal  in size at 9.8 cm. The gallbladder is normal. Sonographic 's  sign is negative. There are no gallstones. Common duct measures 1 mm.  The aorta and IVC are normal. The right kidney measures 11.2 cm. The  left kidney measures 10.9 cm. There is no hydronephrosis.                                                               IMPRESSION:      Normal abdominal ultrasound.       XRAY:    Other:       Prior medical records were reviewed including, but not limited to, notes from referring providers, lab work, radiographic tests, and other diagnostic tests. Pertinent results were summarized above.     History     Past Medical History:   Diagnosis Date    Anxiety     Depressive disorder     Intractable vomiting with nausea, unspecified vomiting type 11/13/2016    Kidney stone on right side 11/13/2016    DIEGO (obstructive sleep apnea)- severe (AHI 35) 3/24/2022       Past Surgical History:   Procedure Laterality Date    COLONOSCOPY      COLONOSCOPY N/A 1/18/2021    Procedure: COLONOSCOPY, WITH  BIOPSY;  Surgeon: Spencer Barraza MD;  Location: [facility] OR       Social History     Socioeconomic History    Marital status: Single     Spouse name: Not on file    Number of children: 0    Years of education: Not on file    Highest education level: Not on file   Occupational History    Occupation: grad student   Tobacco Use    Smoking status: Never    Smokeless tobacco: Never   Vaping Use    Vaping status: Never Used   Substance and Sexual Activity    Alcohol use: Not Currently    Drug use: No    Sexual activity: Yes     Partners: Male   Other Topics Concern    Not on file   Social History Narrative    Not on file     Social Determinants of Health     Financial Resource Strain: Not on file   Food Insecurity: Not on file   Transportation Needs: Not on file   Physical Activity: Not on file   Stress: Not on file   Social Connections: Not on file   Interpersonal Safety: Not on file   Housing Stability: Not on file        Family History   Problem Relation Age of Onset    Diabetes Mother     Obesity Mother     Hyperlipidemia Mother     Uterine Cancer Maternal Grandmother     Anesthesia Reaction No family hx of     Thrombosis No family hx of      Family history reviewed and edited as appropriate    Medications and Allergies:     Outpatient Encounter Medications as of 10/22/2024   Medication Sig Dispense Refill    acetaminophen (TYLENOL) 500 MG tablet Take 2 tablets (1,000 mg) by mouth 4 times daily 30 tablet 1    albuterol (PROAIR HFA/PROVENTIL HFA/VENTOLIN HFA) 108 (90 Base) MCG/ACT inhaler Inhale 1 puff into the lungs as needed      amphetamine-dextroamphetamine (ADDERALL XR) 10 MG 24 hr capsule Take 10 mg by mouth 3 times daily      escitalopram (LEXAPRO) 20 MG tablet Take 1 tablet by mouth every morning      famotidine (PEPCID) 20 MG tablet Take 1 tablet (20 mg) by mouth 2 times daily 180 tablet 3    LORazepam (ATIVAN) 0.5 MG tablet Take 0.5 mg by mouth every 6 hours as needed      menthol-zinc oxide (CALMOSEPTINE) 0.44-20.6 % OINT ointment Apply topically 4 times daily as needed for skin protection Apply thick layer to perianal skin 3-4 times daily for skin irritation. 113 g 3    omeprazole (PRILOSEC) 40 MG DR capsule Take 1 capsule (40 mg) by mouth daily 90 capsule 2    ondansetron (ZOFRAN ODT) 4 MG ODT tab Take 1 tablet (4 mg) by mouth every 8 hours as needed for nausea 20 tablet 0    ZOLMitriptan (ZOMIG) 2.5 MG tablet Take 1 tablet (2.5 mg) by mouth at onset of headache for migraine May repeat in 2 hours. Max 4 tablets/24 hours. 18 tablet 9     No facility-administered encounter medications on file as of 10/22/2024.        Allergies   Allergen Reactions    Codeine Anaphylaxis    Hydromorphone Nausea and Vomiting     Zofran DOES NOT HELP, only compounds problem, PER PT.     Oxycodone Other (See Comments)     Vomitting          Review of systems:  A full 10 point review of systems was obtained and was negative except for  the pertinent positives and negatives stated within the HPI.    Objective Findings:   Physical Exam:    Constitutional: There were no vitals taken for this visit.  General: Alert, cooperative, no distress, well-appearing  Head: Atraumatic, normocephalic, no obvious abnormalities   Eyes: Sclera anicteric, no obvious conjunctival hemorrhage   Nose: Nares normal, no obvious malformation, no obvious rhinorrhea   Respiratory: Resting comfortably, no apparent distress, no cough.   Skin: No jaundice, no obvious rash  Neurologic: AAOx3, no obvious neurologic abnormality  Psychiatric: Normal Affect, appropriate mood  Extremities: No obvious edema, no obvious malformation     Labs, Radiology, Pathology     Lab Results   Component Value Date    WBC 6.8 04/26/2022    WBC 7.8 11/27/2018    WBC 7.7 09/02/2017    HGB 12.2 (L) 04/24/2024    HGB 12.9 (L) 04/26/2022    HGB 13.6 11/27/2018     04/26/2022     11/27/2018     09/02/2017    TRIG 113 06/29/2022    ALT 46 04/24/2024    ALT 78 (H) 04/26/2022    ALT 78 (H) 11/27/2018    AST 24 04/24/2024    AST 43 04/26/2022    AST 33 11/27/2018     06/20/2024     04/26/2022     11/27/2018    BUN 15.9 06/20/2024    BUN 17 04/26/2022    BUN 18 11/27/2018    CO2 24 06/20/2024    CO2 25 04/26/2022    CO2 25 11/27/2018    TSH 1.71 04/24/2024    TSH 0.79 12/11/2016    INR 1.04 12/11/2016        Liver Function Studies -   Recent Labs   Lab Test 04/26/22  1111   PROTTOTAL 7.7   ALBUMIN 3.6   BILITOTAL 0.3   ALKPHOS 158*   AST 43   ALT 78*          Patient Active Problem List    Diagnosis Date Noted    Esophageal reflux 03/24/2022     Priority: Medium    Depressive disorder 03/24/2022     Priority: Medium    Anxiety 03/24/2022     Priority: Medium    DIEGO (obstructive sleep apnea)- severe (AHI 35) 03/24/2022     Priority: Medium     3/23/2022 [place] [facility] Sleep Study (-) - AHI 35.3, RDI 38.3, Supine AHI 69.1, Lateral AHI 5, REM AHI 1.7, Low O2 78.0%, Time  Spent <=88% 20.0 minutes / Time Spent <=89% 28.2 minutes.      Class 1 obesity due to excess calories with serious comorbidity and body mass index (BMI) of 32.0 to 32.9 in adult 03/24/2022     Priority: Low      Assessment and Plan   Assessment/Plan:    Kirby Patel is a 39 year old male with significant past medical history pertinent for anxiety, depression, DIEGO, migraine headaches who is presenting as a follow up patient however as a new patient to myself with a chief complaint of reflux and BRBPR.    #BRBPR   Colonoscopy in 2021 that was unremarkable, suspected to have outlet bleeding.  Patient was seen by colorectal surgery team 7/12/2023 with findings notable for internal hemorrhoids.  Options of hemorrhoid banding versus conservative management were discussed.  Despite increase indaily fiber supplementation and water intake Kirby continued to be symptomatic.    6/21/2024 underwent hemorrhoidectomy.  Patient was seen postoperatively 7/12/2024 by the colorectal surgery team and recommend initiation of daily fiber supplementation as well as a follow-up as needed    Today Kirby reports daily rectal bleeding occurring with defecation and independently of defecation onset approximately 4 weeks prior.  He has yet to follow-up with his colorectal surgery team.  He does endorse regular bowel movements with 2-4 stools daily consistent with [place] stool scale 4 which is an improvement from prior.    - Follow-up with your colorectal surgery team today  - Continued use of your squatty potty  - Emphasis placed on the importance of hydration especially in the setting of daily fiber supplementation  - Continue daily fiber supplementation with a goal of increasing this to 2-3 tablespoons per day.  Additional information/recommendations provided in the AVS    #Possible Lactose Intolerance    - As needed use of Lactaid, future considerations would be to attempt a 2 to 4-week dairy free trial     #GERD  1/2020 EGD with  findings notable for reactive gastropathy and peptic duodenitis, without erosive esophagitis.  Biopsies negative for H. pylori, intestinal metaplasia or dysplasia. Duodenal biopsies with foveolar metaplasia consistent with peptic duodenitis.      Overall symptoms have improved with current acid suppressive regimen as well as a focus on healthy dietary habits and intentional weight loss. Denies breakthrough symptoms of heartburn/regurgitation and symptoms of dysphagia/odynophagia.    Discussed the new updates in the Dean criteria. As Kriby has had difficulties with attempted de-escalation of acid suppressive therapy would then consider objective pH monitoring in the future.  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    - Continue Omeprazole to 40 mg once daily, best taken on an empty stomach at least 30 - 60 minutes prior to the first meal of the day   - Continue Famotidine 20 mg twice daily once in the morning and once in the evening.   - Reflux friendly lifestyle modifications as directed within AVS    #Elevated ALP --> Resolved --> Fractionated as Bone Origin  #Elevated ALT --> Resolved    Chronic mild elevation in ALT <2x ULN (7+ years) with new mild elevation in alkaline phosphatase <2x ULN. Fractionated ALP was of bone origin. CLD panel negative. Ultrasound of the abdomen 2019 with normal echotexture of the liver without focal abnormality.  Both of which since resolved in the setting of intentional weight loss raising concern for possible MASLD.    - Follow-up with your primary care provider form ALP of bone origin.  Emphasized the importance of bone health including high impact physical activity and weight lifting as well as the appropriate supplementation with calcium/vitamin D especially in light of possible lactose intolerance.    #Chronic Normocytic Anemia   Previous attempts at daily iron  supplementation led to significant constipation.  Source is likely secondary to chronic rectal bleeding and nutritional sources at the diet with minimal iron    - Labs to be completed next best convenience  - Please attempt to increase your intake of iron rich meats and search for meat alternatives that high in iron examples would be leafy green vegetables such as spinach, kale, collards, chard as well as  broccoli, beans and lentils  - Future considerations would be consultation to on staff dietitian    Follow up plan:   Return to clinic 4 months and as needed.    The risks and benefits of my recommendations, as well as other treatment options were discussed with the patient and any available family today. All questions were answered.     Follow up: As planned above. Today, I personally spent 24 minutes in direct face to face time with the patient, of which greater than 50% of the time was spent in patient education and counseling as described above. Approximately 6 minutes were spent on indirect care associated with the patient's consultation including but not limited to review of: patient medical records to date, clinic visits, hospital records, lab results, imaging studies, procedural documentation, and coordinating care with other providers. The findings from this review are summarized in the above note. All of the above accounted for a cumulative time of 30 minutes and was performed on the date of service.     The patient verbalized understanding of the plan and was appreciative for the time spent and information provided during the office visit.           Sol Vo PA-C  Division of Gastroenterology, Hepatology, and Nutrition  [facility]       Documentation assisted by voice recognition and documentation system.

## 2024-10-23 NOTE — PATIENT INSTRUCTIONS
It was a pleasure meeting with you today and discussing your healthcare plan. Below is a summary of what we covered:    - Follow-up with your colorectal surgery team today  - Continued use of your squatty potty  - Emphasis placed on the importance of hydration especially in the setting of daily fiber supplementation  - Continue daily fiber supplementation with a goal of increasing this to 2-3 tablespoons per day.  Additional information/recommendations provided in the AVS  - Continue Omeprazole to 40 mg once daily, best taken on an empty stomach at least 30 - 60 minutes prior to the first meal of the day   - Continue Famotidine 20 mg twice daily once in the morning and once in the evening.   - Reflux friendly lifestyle modifications as directed within AVS  - Follow-up with your primary care provider form ALP of bone origin.  Emphasized the importance of bone health including high impact physical activity and weight lifting as well as the appropriate supplementation with calcium/vitamin D especially in light of possible lactose intolerance.  - Please attempt to increase your intake of iron rich meats and search for meat alternatives that high in iron examples would be leafy green vegetables such as spinach, kale, collards, chard as well as  broccoli, beans and lentils    Please call my nurse Lynette ([phone]), Nathaniel ([phone]) with any questions or concerns.      Gastroesophageal Reflux Disease (GERD) Lifestyle Modifications:   If taking acid suppression therapy (PPI ie Pantoprazole, Lansoprazole, Omeprazole, Esomeprazole, Rabeprazole, Dexlansoprazole) it should be taken 30 - 60 minutes prior to meals on an empty stomach to have maximum effect  Avoid triggers for reflux such as coffee, chocolate, carbonated beverages, spicy foods, acidic foods (tomato based/citrus and foods with high fat content   Abstinence from alcohol and cessation of all tobacco products is recommended   Studies have shown that weight  loss, exercise and maintaining a healthy BMI significantly reduce GERD symptoms   Remain upright while eating and immediately after meals  Do not eat or drink at least 3 hours prior to laying down supine/laying down for bed   Avoid late night/middle of the night snacking    Consider obtaining a wedge pillow or elevating the head end of the bed while sleeping   Avoid sleeping right side down as this can place the lower part of the esophagus/lower esophageal sphincter in a dependent position that favors reflux   Attempting to eat smaller more frequent meals may improve symptoms     Fiber Recommendations:  Recommend starting supplementation with a powdered soluble fiber. When used on a daily basis, this can help regulate the consistency of your stools. Metamucil (psyllium) and Citrucel are preferred examples as these are gel forming fibers. Alternatives which are non gel forming include Benefiber, Esther Powder and Inulin fiber. For males the goal is to obtain 30-35g of fiber daily and for females 20-25 between supplementation and dietary intake. You can start with 1-2 teaspoons per day, with goal to gradually increase to 1 tablespoon daily. You can increase up to 1 tablespoon three times daily if needed. It is important to stay well-hydrated with use of fiber supplementation and to make sure that the fiber powder is well mixed with water as directed on the label. You may experience some bloating with initiation of fiber, which will improve over the first few weeks. A good trial to evaluate the effect is 3-6 months. Of note, many of the fiber products contain artificial sweeteners, which can cause bloating, gas, and diarrhea in those who may be sensitive to artificial sweeteners. If this is the case, recommend trying Metamucil Premium Blend (with Stevia), Metamucil 4-in-1 without added Sweeteners, or Bellway (sweetened with Monk fruit extract).      See below for any additional questions and scheduling  guidelines.    Sign up for YouView: YouView patient portal serves as a secure platform for accessing your medical records from the [facility]. Additionally, YouView facilitates easy, timely, and secure messaging with your care team. If you have not signed up, you may do so by using the provided code or calling [phone].    Coordinating your care after your visit:  There are multiple options for scheduling your follow-up care based on your provider's recommendation.    How do I schedule a follow-up clinic appointment:   After your appointment, you may receive scheduling assistance with the Clinic Coordinators by having a seat in the waiting room and a Clinic Coordinator will call you up to schedule.  Virtual visits or after you leave the clinic:  Your provider has placed a follow-up order in the You[employer] portal for scheduling your return appointment. A member of the scheduling team will contact you to schedule.  YouView Scheduling: Timely scheduling through YouView is advised to ensure appointment availability.   Call to schedule: You may schedule your follow-up appointment(s) by calling [phone], option 1.    How do I schedule my endoscopy or colonoscopy procedure:  If a procedure, such as a colonoscopy or upper endoscopy was ordered by your provider, the scheduling team will contact you to schedule this procedure. Or you may choose to call to schedule at   [phone], option 2.  Please allow 20-30 minutes when scheduling a procedure.    How do I get my blood work done? To get your blood work done, you need to schedule a lab appointment at an [facility]. There are multiple ways to schedule:   At the clinic: The Clinic Coordinator you meet after your visit can help you schedule a lab appointment.   YouView scheduling: YouView offers online lab scheduling at all [facility] laboratory locations.   Call to schedule: You can call [phone] to schedule your lab  appointment.    How do I schedule my imaging study: To schedule imaging studies, such as CT scans, ultrasounds, MRIs, or X-rays, contact Imaging Services at [phone].    How do I schedule a referral to another doctor: If your provider recommended a referral to another specialist(s), the referral order was placed by your provider. You will receive a phone call to schedule this referral, or you may choose to call the number attached to the referral to self-schedule.    For Post-Visit Question(s):  For any inquiries following today's visit:  Please utilize [employer] messaging and allow 48 hours for reply or contact the Call Center during normal business hours at [phone], option 3.  For Emergent After-hours questions, contact the On-Call GI Fellow through the [facility] at [phone].  In addition, you may contact your Nurse directly using the provided contact information.    Test Results:  Test results will be accessible via [employer] in compliance with the 21st Century Cures Act. This means that your results will be available to you at the same time as your provider. Often you may see your results before your provider does. Results are reviewed by staff within two weeks with communication follow-up. Results may be released in the patient portal prior to your care team review.    Prescription Refill(s):  Medication prescribed by your provider will be addressed during your visit. For future refills, please coordinate with your pharmacy. If you have not had a recent clinic visit or routine labs, for your safety, your provider may not be able to refill your prescription.         Sincerely,    Sol Vo PA-C  Division of Gastroenterology, Hepatology, and Nutrition  [facility]

## 2024-12-30 DIAGNOSIS — G47.33 OSA (OBSTRUCTIVE SLEEP APNEA): Primary | Chronic | ICD-10-CM

## 2024-12-30 DIAGNOSIS — E66.811 CLASS 1 OBESITY DUE TO EXCESS CALORIES WITH SERIOUS COMORBIDITY AND BODY MASS INDEX (BMI) OF 32.0 TO 32.9 IN ADULT: Chronic | ICD-10-CM

## 2024-12-30 DIAGNOSIS — G47.33 OBSTRUCTIVE SLEEP APNEA SYNDROME: ICD-10-CM

## 2024-12-30 DIAGNOSIS — E66.09 CLASS 1 OBESITY DUE TO EXCESS CALORIES WITH SERIOUS COMORBIDITY AND BODY MASS INDEX (BMI) OF 32.0 TO 32.9 IN ADULT: Chronic | ICD-10-CM

## 2025-02-10 NOTE — PROGRESS NOTES
"Colon and Rectal Surgery Follow-up Clinic Note      RE: Kirby Patel  : 1985  WAGNER: 2025    DIAGNOSIS: 39 year old male status post hemorrhoidectomy on 24.     INTERVAL HISTORY: Returns with bright red blood per rectum, mainly upon wiping.  This happened approximately 2 months ago and did improve after increasing his fiber and water consumption but still occurs fairly frequently.  Has also been feeling more constipated.  Denies pain fevers or chills. Tolerating diet well.  Underwent laparoscopic appendectomy in 2024.  Colonoscopy in  was normal.    Physical Examination:  /81 (BP Location: Right arm, Patient Position: Sitting, Cuff Size: Adult Regular)   Pulse 109   Ht 1.626 m (5' 4\")   Wt 88.7 kg (195 lb 8 oz)   SpO2 100%   BMI 33.56 kg/m    Perianal skin intact.  Very small external hemorrhoids with no evidence of bleeding or thrombosis.  Small anterior midline fissure.  Digital rectal exam: Very tight anal sphincter.  No masses palpated.  No stricture.    ASSESSMENT  Small hemorrhoids with no evidence of bleeding.  Anterior midline fissure.  Hypertonic anal sphincter.    Final Diagnosis      A().  Mucosa and soft tissue, anal region,  right posterior, excision:  -Squamocolumnar mucosa with prominent underlying dilated blood vessels consistent with hemorrhoidal tissue.  -Negative for dysplasia or malignancy.    B(). Mucosa and soft tissue, anal region,  left lateral, excision:  -Squamocolumnar mucosa with prominent underlying dilated blood vessels consistent with hemorrhoidal tissue.  -Negative for dysplasia or malignancy.    C(). Mucosa and soft tissue, anal region,  right anterior, excision:  -Squamocolumnar mucosa with prominent underlying dilated blood vessels consistent with hemorrhoidal tissue.  -Negative for dysplasia or malignancy.       PLAN  High-fiber diet.  Start Citrucel 1 tablespoon twice daily.  Okay to increase up to 3 times daily.  Keep water " consumption to 30-40 ounces per day.  MiraLAX as needed daily.  Diltiazem 2% topical 3 times daily for at least 6 weeks.  Calmoseptine as needed perianal skin irritation.  Frequent sitz bath's and use Tucks for wiping.  Refer to PT for biofeedback therapy with likely component of outlet obstruction.    30 minutes spent on the date of encounter performing chart review, history and exam, documentation.     Ronen Glaser MD, MSc, FACS, FASCRS.  Professor and Chief  Division of Colon and Rectal Surgery  [facility]  Stanley M. Goldberg, MD Endowed Chair, Colon & Rectal Surgery  Department of Surgery  [facility]      Referring Provider:  Ronen Glaser MD  [address]  [place] [address]     Primary Care Provider:  [facility]

## 2025-02-17 ENCOUNTER — OFFICE VISIT (OUTPATIENT)
Dept: SURGERY | Facility: CLINIC | Age: 40
End: 2025-02-17
Payer: COMMERCIAL

## 2025-02-17 VITALS
WEIGHT: 195.5 LBS | HEIGHT: 64 IN | SYSTOLIC BLOOD PRESSURE: 132 MMHG | OXYGEN SATURATION: 100 % | DIASTOLIC BLOOD PRESSURE: 81 MMHG | BODY MASS INDEX: 33.38 KG/M2 | HEART RATE: 109 BPM

## 2025-02-17 DIAGNOSIS — Z98.890 S/P HEMORRHOIDECTOMY: Primary | ICD-10-CM

## 2025-02-17 DIAGNOSIS — K60.2 ANAL FISSURE: ICD-10-CM

## 2025-02-17 DIAGNOSIS — Z87.19 S/P HEMORRHOIDECTOMY: Primary | ICD-10-CM

## 2025-02-17 RX ORDER — POLYETHYLENE GLYCOL 3350 17 G/17G
1 POWDER, FOR SOLUTION ORAL DAILY
Qty: 500 G | Refills: 1 | Status: SHIPPED | OUTPATIENT
Start: 2025-02-17

## 2025-02-17 RX ORDER — METHYLCELLULOSE 2 G/19G
POWDER, FOR SOLUTION ORAL
Qty: 850 G | Refills: 3 | Status: SHIPPED | OUTPATIENT
Start: 2025-02-17

## 2025-02-17 RX ORDER — ANORECTAL OINTMENT 15.7; .44; 24; 20.6 G/100G; G/100G; G/100G; G/100G
OINTMENT TOPICAL 4 TIMES DAILY PRN
Qty: 113 G | Refills: 3 | Status: SHIPPED | OUTPATIENT
Start: 2025-02-17

## 2025-02-17 ASSESSMENT — PAIN SCALES - GENERAL: PAINLEVEL_OUTOF10: NO PAIN (0)

## 2025-02-17 NOTE — NURSING NOTE
"Chief Complaint   Patient presents with    Follow Up       Vitals:    02/17/25 1324   BP: 132/81   BP Location: Right arm   Patient Position: Sitting   Cuff Size: Adult Regular   Pulse: 109   SpO2: 100%   Weight: 195 lb 8 oz   Height: 5' 4\"       Body mass index is 33.56 kg/m .    Lani Ware CMA    "

## 2025-02-17 NOTE — LETTER
"2025       RE: Kirby Patel  [address]  [facility] [address]     Dear Colleague,    Thank you for referring your patient, Kirby Patel, to the [facility] COLON AND RECTAL SURGERY CLINIC [place] at [facility]. Please see a copy of my visit note below.    Colon and Rectal Surgery Follow-up Clinic Note      RE: Kirby Patel  : 1985  WAGNER: 2025    DIAGNOSIS: 39 year old male status post hemorrhoidectomy on 24.     INTERVAL HISTORY: Returns with bright red blood per rectum, mainly upon wiping.  This happened approximately 2 months ago and did improve after increasing his fiber and water consumption but still occurs fairly frequently.  Has also been feeling more constipated.  Denies pain fevers or chills. Tolerating diet well.  Underwent laparoscopic appendectomy in 2024.  Colonoscopy in  was normal.    Physical Examination:  /81 (BP Location: Right arm, Patient Position: Sitting, Cuff Size: Adult Regular)   Pulse 109   Ht 1.626 m (5' 4\")   Wt 88.7 kg (195 lb 8 oz)   SpO2 100%   BMI 33.56 kg/m    Perianal skin intact.  Very small external hemorrhoids with no evidence of bleeding or thrombosis.  Small anterior midline fissure.  Digital rectal exam: Very tight anal sphincter.  No masses palpated.  No stricture.    ASSESSMENT  Small hemorrhoids with no evidence of bleeding.  Anterior midline fissure.  Hypertonic anal sphincter.    Final Diagnosis      A().  Mucosa and soft tissue, anal region,  right posterior, excision:  -Squamocolumnar mucosa with prominent underlying dilated blood vessels consistent with hemorrhoidal tissue.  -Negative for dysplasia or malignancy.    B(). Mucosa and soft tissue, anal region,  left lateral, excision:  -Squamocolumnar mucosa with prominent underlying dilated blood vessels consistent with hemorrhoidal tissue.  -Negative for dysplasia or malignancy.    C(). Mucosa " and soft tissue, anal region,  right anterior, excision:  -Squamocolumnar mucosa with prominent underlying dilated blood vessels consistent with hemorrhoidal tissue.  -Negative for dysplasia or malignancy.       PLAN  High-fiber diet.  Start Citrucel 1 tablespoon twice daily.  Okay to increase up to 3 times daily.  Keep water consumption to 30-40 ounces per day.  MiraLAX as needed daily.  Diltiazem 2% topical 3 times daily for at least 6 weeks.  Calmoseptine as needed perianal skin irritation.  Frequent sitz bath's and use Tucks for wiping.  Refer to PT for biofeedback therapy with likely component of outlet obstruction.    30 minutes spent on the date of encounter performing chart review, history and exam, documentation.     Ronen Glaser MD, MSc, FACS, FASCRS.  Professor and Chief  Division of Colon and Rectal Surgery  [facility]  Stanley M. Goldberg, MD Endowed Chair, Colon & Rectal Surgery  Department of Surgery  [facility]      Referring Provider:  Ronen Glaser MD  [address] [place] [address] [address]  [place] [address]     Primary Care Provider:  [facility]      Again, thank you for allowing me to participate in the care of your patient.      Sincerely,    Ronen Glaser MD

## 2025-02-17 NOTE — PATIENT INSTRUCTIONS
1. Diltiazem ointment 2% to be applied 3 times daily for 8 weeks  2. Start a daily fiber supplement such as Citrucel or Metamucil POWDER. Start with 2 tablespoons daily and slowly increase up to three times a day, if needed, over the next 4-6 weeks. If you are taking the fiber supplement three times a day that means you are taking 2 tablespoons three times a day (total 6 tablespoons daily).    3. MiraLax or Milk of Magnesia if still constipated  4. Drink 10 glasses of water a day  5. Tylenol, ibuprofen, and warm tub baths/sitz baths for pain  6. Follow up in 8 weeks. Follow up sooner if symptoms do not improve after 4 weeks.

## 2025-03-12 DIAGNOSIS — K21.9 GASTROESOPHAGEAL REFLUX DISEASE WITHOUT ESOPHAGITIS: ICD-10-CM

## 2025-03-17 ENCOUNTER — TELEPHONE (OUTPATIENT)
Dept: GASTROENTEROLOGY | Facility: CLINIC | Age: 40
End: 2025-03-17
Payer: COMMERCIAL

## 2025-03-17 RX ORDER — OMEPRAZOLE 40 MG/1
40 CAPSULE, DELAYED RELEASE ORAL DAILY
Qty: 90 CAPSULE | Refills: 2 | Status: SHIPPED | OUTPATIENT
Start: 2025-03-17

## 2025-05-02 ENCOUNTER — MEDICAL CORRESPONDENCE (OUTPATIENT)
Dept: HEALTH INFORMATION MANAGEMENT | Facility: CLINIC | Age: 40
End: 2025-05-02
Payer: COMMERCIAL

## 2025-05-02 ENCOUNTER — TRANSFERRED RECORDS (OUTPATIENT)
Dept: HEALTH INFORMATION MANAGEMENT | Facility: CLINIC | Age: 40
End: 2025-05-02
Payer: COMMERCIAL

## 2025-05-02 LAB
ALT SERPL-CCNC: 37 U/L (ref 0–55)
AST SERPL-CCNC: 26 U/L (ref 11–34)
CREATININE (EXTERNAL): 0.9 MG/DL (ref 0.72–1.25)
GFR ESTIMATED (EXTERNAL): >0 ML/MIN/1.73M^2
GLUCOSE (EXTERNAL): 93 MG/DL (ref 70–124)
POTASSIUM (EXTERNAL): 4.7 MMOL/L (ref 3.5–5.1)

## 2025-05-06 ENCOUNTER — TRANSCRIBE ORDERS (OUTPATIENT)
Dept: OTHER | Age: 40
End: 2025-05-06

## 2025-05-06 DIAGNOSIS — R94.31 ABNORMAL ELECTROCARDIOGRAM: Primary | ICD-10-CM

## 2025-05-06 DIAGNOSIS — R07.9 INTERMITTENT CHEST PAIN: ICD-10-CM

## 2025-05-07 ENCOUNTER — PATIENT OUTREACH (OUTPATIENT)
Dept: CARE COORDINATION | Facility: CLINIC | Age: 40
End: 2025-05-07
Payer: COMMERCIAL

## 2025-05-12 ENCOUNTER — OFFICE VISIT (OUTPATIENT)
Dept: CARDIOLOGY | Facility: CLINIC | Age: 40
End: 2025-05-12
Attending: NURSE PRACTITIONER
Payer: COMMERCIAL

## 2025-05-12 ENCOUNTER — PRE VISIT (OUTPATIENT)
Dept: CARDIOLOGY | Facility: CLINIC | Age: 40
End: 2025-05-12
Payer: COMMERCIAL

## 2025-05-12 ENCOUNTER — LAB (OUTPATIENT)
Dept: LAB | Facility: CLINIC | Age: 40
End: 2025-05-12
Payer: COMMERCIAL

## 2025-05-12 VITALS
DIASTOLIC BLOOD PRESSURE: 79 MMHG | SYSTOLIC BLOOD PRESSURE: 118 MMHG | BODY MASS INDEX: 35.21 KG/M2 | WEIGHT: 205.1 LBS | HEART RATE: 87 BPM | OXYGEN SATURATION: 97 %

## 2025-05-12 DIAGNOSIS — R07.9 INTERMITTENT CHEST PAIN: ICD-10-CM

## 2025-05-12 DIAGNOSIS — R94.31 ABNORMAL ELECTROCARDIOGRAM: ICD-10-CM

## 2025-05-12 LAB
ALBUMIN SERPL BCG-MCNC: 4.4 G/DL (ref 3.5–5.2)
ALP SERPL-CCNC: 171 U/L (ref 40–150)
ALT SERPL W P-5'-P-CCNC: 72 U/L (ref 0–70)
ANION GAP SERPL CALCULATED.3IONS-SCNC: 11 MMOL/L (ref 7–15)
AST SERPL W P-5'-P-CCNC: 50 U/L (ref 0–45)
ATRIAL RATE - MUSE: 91 BPM
BILIRUB SERPL-MCNC: 0.2 MG/DL
BUN SERPL-MCNC: 15.4 MG/DL (ref 6–20)
CALCIUM SERPL-MCNC: 9.3 MG/DL (ref 8.8–10.4)
CHLORIDE SERPL-SCNC: 105 MMOL/L (ref 98–107)
CHOLEST SERPL-MCNC: 181 MG/DL
CREAT SERPL-MCNC: 1.01 MG/DL (ref 0.67–1.17)
DIASTOLIC BLOOD PRESSURE - MUSE: NORMAL MMHG
EGFRCR SERPLBLD CKD-EPI 2021: >90 ML/MIN/1.73M2
FASTING STATUS PATIENT QL REPORTED: NO
FASTING STATUS PATIENT QL REPORTED: NO
GLUCOSE SERPL-MCNC: 103 MG/DL (ref 70–99)
HCO3 SERPL-SCNC: 24 MMOL/L (ref 22–29)
HDLC SERPL-MCNC: 45 MG/DL
INTERPRETATION ECG - MUSE: NORMAL
LDLC SERPL CALC-MCNC: 93 MG/DL
NONHDLC SERPL-MCNC: 136 MG/DL
P AXIS - MUSE: 28 DEGREES
POTASSIUM SERPL-SCNC: 3.8 MMOL/L (ref 3.4–5.3)
PR INTERVAL - MUSE: 160 MS
PROT SERPL-MCNC: 7.5 G/DL (ref 6.4–8.3)
QRS DURATION - MUSE: 90 MS
QT - MUSE: 356 MS
QTC - MUSE: 437 MS
R AXIS - MUSE: 22 DEGREES
SODIUM SERPL-SCNC: 140 MMOL/L (ref 135–145)
SYSTOLIC BLOOD PRESSURE - MUSE: NORMAL MMHG
T AXIS - MUSE: 28 DEGREES
TRIGL SERPL-MCNC: 214 MG/DL
VENTRICULAR RATE- MUSE: 91 BPM

## 2025-05-12 PROCEDURE — 80053 COMPREHEN METABOLIC PANEL: CPT | Performed by: PATHOLOGY

## 2025-05-12 PROCEDURE — 36415 COLL VENOUS BLD VENIPUNCTURE: CPT | Performed by: PATHOLOGY

## 2025-05-12 PROCEDURE — 3074F SYST BP LT 130 MM HG: CPT | Performed by: INTERNAL MEDICINE

## 2025-05-12 PROCEDURE — 3078F DIAST BP <80 MM HG: CPT | Performed by: INTERNAL MEDICINE

## 2025-05-12 PROCEDURE — 80061 LIPID PANEL: CPT | Performed by: PATHOLOGY

## 2025-05-12 PROCEDURE — 93005 ELECTROCARDIOGRAM TRACING: CPT

## 2025-05-12 PROCEDURE — 99205 OFFICE O/P NEW HI 60 MIN: CPT | Performed by: INTERNAL MEDICINE

## 2025-05-12 PROCEDURE — 1126F AMNT PAIN NOTED NONE PRSNT: CPT | Performed by: INTERNAL MEDICINE

## 2025-05-12 PROCEDURE — 99213 OFFICE O/P EST LOW 20 MIN: CPT | Performed by: INTERNAL MEDICINE

## 2025-05-12 RX ORDER — CETIRIZINE HYDROCHLORIDE 10 MG/1
1 TABLET ORAL DAILY
COMMUNITY

## 2025-05-12 ASSESSMENT — PAIN SCALES - GENERAL: PAINLEVEL_OUTOF10: NO PAIN (0)

## 2025-05-12 NOTE — NURSING NOTE
Chief Complaint   Patient presents with    New Patient     NEW CARDIOLOGY - New consult-Abnormal electrocardiogram       Vitals were taken, medications reconciled and EKG performed.    Rico Sams, Clinic Assistant     1:43 PM

## 2025-05-12 NOTE — PATIENT INSTRUCTIONS
"You were seen today in the Cardiovascular Clinic at the [facility].      Cardiology Providers you saw during your visit:  Dr. Emory Root     Recommendations:   EKG today in clinic.  Stop at lab on your way out.  CT of the coronary artery.  Follow up with cardiology TBD based on results.       Thank you for your visit today!   Please MyChart message or call if you have any questions or concerns.      During Business Hours:  [phone], option # 1 \"To leave a message for your care team\"     After hours, weekends or holidays:   [phone], Option #4  Ask to speak to the On-Call Cardiologist. Inform them you are a heart failure patient at the [facility].      Gudelia Alvarado RN BSN   Cardiology Nurse Coordinator - Heart Failure/[facility]  [phone] option 1 to schedule an appointment or leave a message for your care team      Pre-procedure instructions - CTA Angiogram of the Heart  Patient Education    Your arrival time is __________.  Location is [address]    How do I prepare for my exam? (Food and drink instructions)  The day before your exam, drink extra fluids-at least six 8-ounce glasses (unless your doctor wants you to restrict your fluids).  No Food and Drink Restrictions  (Other instructions)  If you take Viagra, Levitra or Cialis, stop taking it for 48 hours before your test.  If you are taking the medication for Pulmonary Hypertesnion DO NOT hold.     What should I wear: Please wear loose clothing, such as a sweat suit or jogging clothes. Avoid snaps, zippers and other metal. We may ask you to undress and put on a hospital gown.     How long does the exam take: Please allow two hours for this test.     What should I bring: Bring any scans or X-rays taken at other hospitals, if similar tests were done. It is safest to leave personal items at " home.     Do I need a : No  is needed.     What do I need to tell my doctor?  Be sure to tell your doctor:  * If you have any allergies.  * If there's any chance you are pregnant.  * If you are breastfeeding.  * If you have diabetes as your medication may need to be adjusted for this exam.     What should I do after the exam: No restrictions, You may resume normal activities.     What is this test: This test looks at your heart and heart arteries. A CT (computed tomography) scan is a series of pictures that allows us to look inside your body. Our scanner creates images of the body in cross sections, much like slices of bread. This helps us see any problems more clearly. Before the scan, we will give you contrast fluid (X-ray dye) through an IV (small needle in your arm). The contrast helps your blood vessels show up more clearly on the pictures.     Who should I call with questions: If you have any questions, please call the Imaging Department where you will have your exam. Directions, parking instructions, and other information is available on our website, [email or web address].

## 2025-05-12 NOTE — PROGRESS NOTES
ADVANCED HEART FAILURE CLINIC CONSULTATION     Name: Kirby Patel  : 1985  MRN: [id number]    May 12, 2025    Dear Colleagues,     I had the pleasure of seeing Mr. Kirby Patel, a 39-year-old male presenting for evaluation of intermittent chest pain.       He endorses intermittent chest pain for the past several years, which he has often attributed to his known irritable bowel syndrome (IBS). He has taken over-the-counter antacids in the past with partial  relief and belching has also alleviated the discomfort.     A recent episode lasted for an entire day without improvement from antacid use. Which prompted him to seek care at [facility], where he had an ECG that showed sinus rhythm. While the ECG is scanned in to the EMR it is difficult to see the trace.     Chest pain is described as a stabbing sensation in the left upper chest, radiating towards the axilla and down the left arm. Chest pressure is alleviated by coughing, putting pressure on his chest, or taking a baby aspirin. Episodes last approximately 1 hour, occur every 2-3 days, and have increased in frequency. He also notes that the pain has been severe enough to wake him from sleep on multiple occasions. They occur both with rest and activity and it is not exacerbated by activity. He overall has no functional limitations and no heart failure symptoms.  He has gained the weight he lost back but has no edema.     He was seen in the ED in  for chest pain and had a normal ECG and negative troponin at that time.     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 He reports high levels of stress related to his upcoming thesis defense in July. He has a history of sleep apnea, diagnosed in  and is compliant with CPAP therapy. No history of hypertension, diabetes, or dyslipidemia. He is a never smoker. No family history of premature CAD. His mother had chest pain and underwent a  stress test.     He has known anemia, and recently began oral iron supplementation one week ago. Prior to starting iron, he experienced fatigue, lightheadedness, and episodes of seeing black-and-white spots. He reports that he has been increasing water intake.He has also noticed neuropathy in both arms, which he describes as a  numbness or tingling.    REVIEW OF SYSTEMS: 10 point ROS neg other than the symptoms noted above in the HPI.    PAST MEDICAL HISTORY:   Past Medical History:   Diagnosis Date    Anxiety     Depressive disorder     Intractable vomiting with nausea, unspecified vomiting type 11/13/2016    Kidney stone on right side 11/13/2016    DIEGO (obstructive sleep apnea)- severe (AHI 35) 3/24/2022       ALLERGIES:    Allergies   Allergen Reactions    Codeine Anaphylaxis    Hydromorphone Nausea and Vomiting     Zofran DOES NOT HELP, only compounds problem, PER PT.     Oxycodone Other (See Comments)     Vomitting         MEDICATIONS:   Current Outpatient Medications   Medication Sig Dispense Refill    acetaminophen (TYLENOL) 500 MG tablet Take 2 tablets (1,000 mg) by mouth 4 times daily 30 tablet 1    albuterol (PROAIR HFA/PROVENTIL HFA/VENTOLIN HFA) 108 (90 Base) MCG/ACT inhaler Inhale 1 puff into the lungs as needed      amphetamine-dextroamphetamine (ADDERALL XR) 10 MG 24 hr capsule Take 10 mg by mouth 3 times daily      diltiazem 2% in PLO gel To anal opening three times daily.  Use a pea-sized amount.  Store at room temperature. 60 g 0    escitalopram (LEXAPRO) 20 MG tablet Take 1 tablet by mouth every morning      famotidine (PEPCID) 20 MG tablet Take 1 tablet (20 mg) by mouth 2 times daily 180 tablet 3    LORazepam (ATIVAN) 0.5 MG tablet Take 0.5 mg by mouth every 6 hours as needed      menthol-zinc oxide (CALMOSEPTINE) 0.44-20.6 % OINT ointment Apply topically 4 times daily as needed for skin protection. Apply thick layer to perianal skin 3-4 times daily for skin irritation. 113 g 3    menthol-zinc  oxide (CALMOSEPTINE) 0.44-20.6 % OINT ointment Apply topically 4 times daily as needed for skin protection Apply thick layer to perianal skin 3-4 times daily for skin irritation. 113 g 3    methylcellulose (CITRUCEL) powder Take 1 tablespoon 2 times per day.  Increase to 3 times per day as needed to achieve daily bowel movements. 850 g 3    omeprazole (PRILOSEC) 40 MG DR capsule Take 1 capsule (40 mg) by mouth daily. 90 capsule 2    ondansetron (ZOFRAN ODT) 4 MG ODT tab Take 1 tablet (4 mg) by mouth every 8 hours as needed for nausea 20 tablet 0    polyethylene glycol (MIRALAX) 17 GM/Dose powder Take 17 g (1 Capful) by mouth daily. 500 g 1    ZOLMitriptan (ZOMIG) 2.5 MG tablet Take 1 tablet (2.5 mg) by mouth at onset of headache for migraine May repeat in 2 hours. Max 4 tablets/24 hours. 18 tablet 9     No current facility-administered medications for this visit.       SOCIAL HISTORY: PhD Student at [employer]  Social History     Tobacco Use    Smoking status: Never    Smokeless tobacco: Never   Vaping Use    Vaping status: Never Used   Substance Use Topics    Alcohol use: Not Currently    Drug use: No        FAMILY HISTORY: No premature CAD. Mom had chest pain and had a stress test.     PHYSICAL EXAM:   /79 (BP Location: Left arm, Patient Position: Chair, Cuff Size: Adult Large)   Pulse 87   Wt 93 kg (205 lb 1.6 oz)   SpO2 97%   BMI 35.21 kg/m    General: comfortable, conversant, NAD  HEENT: normocephalic, atraumatic, anicteric, normal oropharynx  Neck: Estimate JVP <7  CV: RRR, nl s1 and s2, no murmurs, gallops, or rubs   Lungs: CTAB, no crackles or wheezes, normal work of breathing  Abdomen: BS+, soft, non tender, non distended, no HSM  Extremities: warm and well perfused, no edema    DIAGNOSTIC TESTING:   ECG from [facility] reviewed, though scanned copy is very difficult to see. Sinus with rate of 75. No ST-T changes that are clearly visible.     ECG from April 2022 reviewed: Normal sinus rhythm with no ST-T  changes    ASSESSMENT AND PLAN: This is a 39 year-old man with atypical chest pain that is increasing in frequency    Intermittent chest pain: The pain he describes is atypical chest pain, however he has some partial relief with aspirin and nocturnal rest symptoms. Overall, his pre test probability is low-to-intermediate risk using the updated Dimple Katherine model. He has no traditional CAD risk factors, though does have obesity.     I will repeat his ECG today. He should have a coronary CTA to assess for obstructive CAD and he is under 40 with a normal HR. His creatinine was normal a year ago, but I will obtain a BMP today to assess renal function given the need for contrast. Non fasting lipids today.     In the interim, low-dose aspirin can be continued until the CTA is completed, given his symptom relief with aspirin, but no other anti-anginal therapy is initiated at this point. He was counseled on the need to seek immediate care if he experiences new or worsening chest pain, shortness of breath, or symptoms concerning for acute coronary syndrome.     PLAN:   ECG  Labs lipids, BMP   Coronary CTA  Ok to continue aspirin  Go to ER if worse chest pain    60 minutes on DOS for chart review, patient history and exam, counseling, review of diagnostic testing, coordination of care and documentation.     Thank you for allowing me to participate in the care of your patient. Please do not hesitate to contact me if you have any questions.     Sincerely,   Forum     Forum MD Dk, PhD, [facility]  Advanced Heart Failure/Transplantation/MCS  [email or web address]    ADDENDUM:   ECG reviewed: normal sinus rhythm with rate of 91 bpm, no ST-T changes, normal intervals. ECG is comparable to the prior ECG from April 2022.         Labs reviewed: Normal electrolytes and renal function. LDL is 93. Transaminases are slightly elevated and should be followed up with PCP. .   Latest Reference Range & Units 05/12/25 15:04    Sodium 135 - 145 mmol/L 140   Potassium 3.4 - 5.3 mmol/L 3.8   Chloride 98 - 107 mmol/L 105   Carbon Dioxide (CO2) 22 - 29 mmol/L 24   Urea Nitrogen 6.0 - 20.0 mg/dL 15.4   Creatinine 0.67 - 1.17 mg/dL 1.01   GFR Estimate >60 mL/min/1.73m2 >90   Calcium 8.8 - 10.4 mg/dL 9.3   Anion Gap 7 - 15 mmol/L 11   Albumin 3.5 - 5.2 g/dL 4.4   Protein Total 6.4 - 8.3 g/dL 7.5   Alkaline Phosphatase 40 - 150 U/L 171 (H)   ALT 0 - 70 U/L 72 (H)   AST 0 - 45 U/L 50 (H)   Bilirubin Total <=1.2 mg/dL 0.2   Cholesterol <200 mg/dL 181   Patient Fasting?  No  No   Glucose 70 - 99 mg/dL 103 (H)   HDL Cholesterol >=40 mg/dL 45   LDL Cholesterol Calculated <100 mg/dL 93   Non HDL Cholesterol <130 mg/dL 136 (H)   Triglycerides <150 mg/dL 214 (H)   (H): Data is abnormally high          CC:   Patient Care Team         Relationship Specialty Notifications Start [facility] PCP - General Clinic  3/23/16     NO PCP per pt    Fax: [phone]          [address] [place] [address]    Spencer Barraza MD MD Gastroenterology  6/2/16     Phone: [phone] Fax: [phone]         [address] [facility] [address]    Branden Melendrez MD Referring Physician Pediatrics  3/11/19     Referring to GI    Phone: [phone] Fax: [phone]         [address] [address]    Roderick Hargrove MD MD Neurology  8/17/20      XXX [place] INACT[place] AS OF JUNE 2024 XXX    Rico Burciaga PA-C Physician Assistant Physician Assistant  8/17/20     Phone: [phone] Fax: [phone]         [address] [address]    Patel Smith MD MD Cardiology  8/17/20     Phone: [phone] Fax: [phone] [address] [facility] [address]    Gricel Childers APRN CNP Nurse Practitioner Neurology  3/22/22     Phone: [phone] Fax: [phone] [address] [facility] [id number] [address]    Toni  Ivana Jin PA-C (Inactive) Physician Assistant Gastroenterology  3/22/22     Phone: [phone] Fax: [phone]         [address] [facility] [address]    Caty Garland RD Registered Dietitian Dietitian, Registered  8/16/22     Phone: [phone] Fax: [phone]         [address] [facility] [address]    Bloch, Lauren Turner, [facility] Pharmacist Pharmacist  9/26/22     Phone: [phone]          [address] [place] [address]    Bindu Matias PA-C Physician Assistant Gastroenterology  2/10/23     Referred to Colorectal    Phone: [phone] Fax: [phone]         [facility] [address] [place] [address]    Nisha Hartmann APRN CNP Nurse Practitioner Colon & Rectal  4/13/23     Phone: [phone] Fax: [phone]         [address] [place] [address] [address] [facility] [address]    Sol Vo PA-C Assigned Gastroenterology Provider   1/25/24     Phone: [phone] Fax: [phone]         [address] [place] [address]    Ronen Glaser MD MD Colon & Rectal  4/23/24     Phone: [phone] Fax: [phone]         [address] [facility] [address] [facility] [address]    Gricel Childers APRN CNP Assigned Neuroscience Provider   8/23/24     Phone: [phone] Fax: [phone]         [address] [place], [place] [id number] [facility] [address]    Ronen Glaser MD Assigned Surgical Provider   2/23/25     Phone: [phone] Fax: [phone]         [address] [facility] [address] [facility] [address]    Emory Root MD MD Cardiovascular Disease  5/8/25     Phone: [phone] Fax: [phone]         [address] [facility] [address]

## 2025-05-27 ENCOUNTER — VIRTUAL VISIT (OUTPATIENT)
Dept: GASTROENTEROLOGY | Facility: CLINIC | Age: 40
End: 2025-05-27
Attending: PHYSICIAN ASSISTANT
Payer: COMMERCIAL

## 2025-05-27 DIAGNOSIS — D64.9 NORMOCYTIC ANEMIA: ICD-10-CM

## 2025-05-27 DIAGNOSIS — R79.89 ELEVATED LFTS: Primary | ICD-10-CM

## 2025-05-27 DIAGNOSIS — K62.5 BRBPR (BRIGHT RED BLOOD PER RECTUM): ICD-10-CM

## 2025-05-27 PROCEDURE — 98007 SYNCH AUDIO-VIDEO EST HI 40: CPT | Performed by: PHYSICIAN ASSISTANT

## 2025-05-27 PROCEDURE — 1126F AMNT PAIN NOTED NONE PRSNT: CPT | Mod: 95 | Performed by: PHYSICIAN ASSISTANT

## 2025-05-27 NOTE — PROGRESS NOTES
Virtual Visit Details    Type of service:  Video Visit     Originating Location (pt. Location): Home    Distant Location (provider location):  Off-site  Platform used for Video Visit: [facility]    Gastroenterology Visit for: Kirby Patel 1985   MRN: [id number]     Reason for Visit:  chief complaint    Referred by: Self  / No address on file  Patient Care Team:  Service, [facility] as PCP - General (Clinic)  Spencer Barraza MD as MD (Gastroenterology)  Branden Melendrez MD as Referring Physician (Pediatrics)  Roderick Hargrove MD as MD (Neurology)  Rico Burciaga PA-C as Physician Assistant (Physician Assistant)  Patel Smith MD as MD (Cardiology)  Gricel Childers APRN CNP as Nurse Practitioner (Neurology)  Ivana Muñoz PA-C (Inactive) as Physician Assistant (Gastroenterology)  Caty Garland RD as Registered Dietitian (Dietitian, Registered)  Bloch, Lauren Turner, [facility] as Pharmacist (Pharmacist)  Bindu Matias PA-C as Physician Assistant (Gastroenterology)  Nisha Hartmann APRN CNP as Nurse Practitioner (Colon & Rectal)  Sol Vo PA-C as Assigned Gastroenterology Provider  Ronen Glaser MD as MD (Colon & Rectal)  Gricel Childers APRN CNP as Assigned Neuroscience Provider  Ronen Glaser MD as Assigned Surgical Provider  Emory Root MD as MD (Cardiovascular Disease)  Emory Root MD as Assigned Heart and Vascular Provider    History of Present Illness:   Kibry Patel is a 39 year old male with significant past medical history pertinent for anxiety, depression, DIEGO, migraine headaches who is presenting as a follow up patient however as a new patient to myself with a chief complaint of irregular bowel patterns and  "anemia.    -----------------------------------------------------------------------------------------------------------------------------------------------------------------------------------------------------------------------------------  Interval History May 27, 2025:    Reflux - Continues to take omeprazole 40 mg once daily and famotidine 20 mg twice daily.  Denies dysphagia/odynophagia.    Iron - Taking oral supplementation every other day which has resulted in increased nausea and increased reflux. This is relieved with TUMs/Andressa Venus. Does not consume beef or other red meat products. Consumes pork and chicken. Now consuming cereal that is fortified with iron.     BRBPR - Returned to [facility] secondary to intermittent rectal bleeding and appreciation of external tissue abnormality. Last experienced BRBPR 1-2 weeks after being seen by [facility] 2/17/2025.     Bowel Patterns - Takes Miralax 17g as needed. Continues to take Benefiber gummies 3, 1x daily. With increasing this to twice daily dosing develops larger stool quantity and increased abdominal discomfort. Described as \"a build up of poop.\" With appropriate hydration does not need to use stool softener to experience regular bowel patterns. Now is stooling ~3x per day on average consistent with [place] Stool Scale Type 4. Decreased physical activity over all secondary to work for his dissertation. Denies pain/discomfort with defecation. Denies difficulties with initiation of a bowel movement. Explains sensation of needing to empty more which results in straining. Further described as sensation of incomplete evacuation.     Used Dilt 2% cream twice daily for ~3 weeks time. Will complete Sitz baths 2x per month. When showering will apply hot water for prolonged periods of time. Did not complete PT for pelvic floor dysfunction.     Weight - 205 lbs. Did weigh approximately 177.     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 " "    -----------------------------------------------------------------------------------------------------------------------------------------------------------------------------------------------------------------------------------  Interval History October 22, 2024:    Had significant rectal pain after the procedure. Noting that \"it was so painful that I had suicidal thoughts.\" Had an emergency call with provider at [facility]. Explains that his psych nurse was very understanding. Reports allergy to the pain medications. ~ 5 weeks after the surgical intervention has had gradually began to \"feel better.\"     Rectal Bleeding - Onset ~4 weeks ago had reoccurrence of rectal bleeding occurring daily with defecation and independently of defecation. When noted during defecation it is mixed within the stool and within the water in the toilet bowl. When the rectal bleeding is occurring independently of defecation notes smears of red blood with mucus on the toilet paper. Explaining he has a \"wet fart\" which prompts him to the wipe.     Bowel Patterns - Now has increased water intake, continues taking fiber supplementation and has purchased a a squatty potty. In regards to his defecatory patterns he is stooling 2-4x per day. Stools are consistent with [place] Stool Scale Type 4.     Reflux - Currently well-controlled with omeprazole 40 mg once daily and famotidine 20 mg twice daily.  Denies dysphagia/odynophagia.    Recently bought a walking pad to go under his desk    Last year of PHD at the [employer].     -----------------------------------------------------------------------------------------------------------------------------------------------------------------------------------------------------    Interval History April 16, 2024:    Reflux - Omeprazole 40 mg once daily and Famotidine 20 mg twice daily without break through symptoms of heartburn or regurgitation. Will miss doses of the Famotidine. Noting if he avoid " triggers food and consume enough water the reflux would be well controlled without the Famotidine.      Bowel Patterns - Currently he is stool 3-4 times per day consistent with [place] Stool Scale Type 1-3. 3 weeks of BRBPR which resolved this past Saturday. Had cessation this Saturday. Now using Preparation H once daily x3 days with symptomatic improvement. Currently taking two teaspoons twice daily of Benefiber. No additional laxative use     Weight - 205 lbs --> 182 lbs. Newer exercise equipment at home     Denies unintentional weight loss, nausea, emesis, dysphagia, odynophagia, heartburn, regurgitation, abdominal pain, diarrhea, constipation (< 3 stools per week) and melena.     -----------------------------------------------------------------------------------------------------------------------------------------------------------------------------------------------------------------------------------    Interval History July 27, 2023:    Current acid suppressive medication includes Omeprazole in the morning on an empty stomach and Famotidine 20 mg twice daily prior to meals. Continues to have indigestion later in the evening/prior to bed is relieved with the use of TUMs. Denies heartburn/regurgitation.      Currently Kirby is having bowel movement 2-3 times daily consistent with [place] Stool Scale Type 4.  Since his last office visit he was evaluated by the colorectal team and advised to increase daily fiber supplementation/daily water intake.  With increase in fiber has noted less frequent red blood per rectum. With more agressive wiping has noted red blood on the toilet paper.     Denies weight loss, nausea, emesis, dysphagia, odynophagia, heartburn, regurgitation, abdominal pain, diarrhea, constipation (< 3 stools per week), melena and hematochezia.     No family history or GI related malignancy (esophageal, gastric, pancreatic, liver or colon) or family history of IBD/celiac disease.     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      ---------------------------------------------------------------------------------------------------------------------------------------------------------  4/12/2023 SOURAV Matias PA-C:     37 year old male with PMH of anxiety, depression, DIEGO, migraine headaches, presenting to GI clinic for follow-up.     Kirby has previously established in our clinic with Dr. Ofelia Milligan and Ivana Muñoz PA-C. Briefly, he was initially evaluated by a gastroenterologist in [place], where he was diagnosed with irritable bowel syndrome and GERD. He was later seen at [facility] between 2015 and 2016, at which time he had an EGD and colonoscopy for blood in stools. Per patient, this was unremarkable. At time of consultations at our clinic, he reported a variable stool pattern with loose stools alternating with constipation. He underwent EGD 1/23/2020 with normal esophagus, stomach with erythema and a few erosions in the gastric antrum, with biopsies suggestive of reactive gastropathy, without evidence of H pylori. Duodenal biopsies consistent with peptic duodenitis. Patient underwent colonoscopy 1/18/2021 without source of symptoms. He had previously been given a course of Rifaximin for symptoms, with initial course offering symptom improvement.     At most recent follow-up January 2022, he noted bloating and constipation, followed by diarrhea. He was recommended to start magnesium 400 mg daily. In regard to GERD, he was recommended to continue omeprazole.     Today, Kirby notes he has lost weight; he notes that around the Fall of  Last year, he was formaly diagnosed and placed on ADHD medications and started to lose weight following this. His partner started a weight loss journey around that time, and this has changed Kirby's eating habits as well, noting decreased intake of take-out (previously daily), and decreased volume of  intake. He estimates with these changes, he has lost 25 pounds intentionally. He notes that this has helped the heartburn and reflux significantly. He uses famotidine daily to as needed, which helps. Remains on Prilosec.     However, he continues to have periods of time where he notes he has BRBPR with bowel movements. He is trying to strain less and spend less time on the toilet. He notes there was a time where he was taking fiber, and instead of helping with the stools, he notes he had increased bloating and constipated. Currently, he notes he has ongoing variable bowel habits. He notes the bloating has been improved by cutting out soda. Coffee can augment bloating as well. He uses Gas-X if needed. He is having bowel movements ranging from once daily, which then is followed by what he describes as a release, with 5-6 clustered bowel movements that day. He estimates that this has happened 3-4 times in the last few months. Stools can often be hard and dry, associated with straining and a sense of incomplete evacuation. He is not currently using magnesium.        Esophageal Questionnaire(s)    BEDQ Questionnaire      4/16/2024     7:50 AM 10/22/2024     3:08 PM   BEDQ Questionnaire: How Often Have You Had the Following?   Trouble eating solid food (meat, bread, vegetables) 0 0   Trouble eating soft foods (yogurt, jello, pudding) 0 0   Trouble swallowing liquids 0 0   Pain while swallowing 0 0   Coughing or choking while swallowing foods or liquids 0 0   Total Score: 0 0         4/16/2024     7:50 AM 10/22/2024     3:08 PM   BEDQ Questionnaire: Discomfort/Pain Ratings   Eating solid food (meat, bread, vegetables) 0 0   Eating soft foods (yogurt, jello, pudding) 0 0   Drinking liquid 0 0   Total Score: 0 0       Eckardt Questionnaire      4/16/2024     7:51 AM 10/22/2024     3:09 PM   Eckardt Questionnaire   Dysphagia 0 0   Regurgitation 0 1   Retrosternal Pain 0 1   Weight Loss (kg) 2 0   Total Score:  2 2        Promis 10 Questionnaire      4/16/2024     7:52 AM 10/22/2024     3:10 PM   PROMIS 10 FLOWSHEET DATA   In general, would you say your health is: 2 2   In general, would you say your quality of life is: 2 2   In general, how would you rate your physical health? 1 2   In general, how would you rate your mental health, including your mood and your ability to think? 3 3   In general, how would you rate your satisfaction with your social activities and relationships? 3 3   In general, please rate how well you carry out your usual social activities and roles. (This includes activities at home, at work and in your community, and responsibilities as a parent, child, spouse, employee, friend, etc.) 3 3   To what extent are you able to carry out your everyday physical activities such as walking, climbing stairs, carrying groceries, or moving a chair? 3 3   In the past 7 days, how often have you been bothered by emotional problems such as feeling anxious, depressed, or irritable? 3 3   In the past 7 days, how would you rate your fatigue on average? 5 3   In the past 7 days, how would you rate your pain on average, where 0 means no pain, and 10 means worst imaginable pain? 3 2   Mental health question re-calculation - no clinical value 3 3   Physical health question re-calculation - no clinical value 1 3   Pain question re-calculation - no clinical value 4 4   Global Mental Health Score 11 11   Global Physical Health Score 9 12   PROMIS TOTAL - SUBSCORES 20 23           STUDIES & PROCEDURES:    EGD:     1/2020 [facility]     FINAL DIAGNOSIS:   A. DUODENAL BIOPSY:   - Duodenal mucosa with foveolar metaplasia consistent with peptic   duodenitis     B. GASTRIC BIOPSY:   - Mild chronic gastritis   - Features of reactive gastropathy   - No H. pylori like organisms identified on routine staining   - Negative for intestinal metaplasia or dysplasia     Colonoscopy:    1/8/2021  Findings:       The terminal ileum appeared normal.  Biopsies were taken with a cold        forceps for histology.        The entire examined colon appeared normal.        Biopsies for histology were taken with a cold forceps from the right        colon, left colon and rectum for evaluation of microscopic colitis.     Impression:          - The examined portion of the ileum was normal. Biopsied.                        - The entire examined colon is normal.                        - Biopsies were taken with a cold forceps from the right                        colon, left colon and rectum for evaluation of                        microscopic colitis.                        - Retroflexion was not performed as the rectal vault was                        small and patient would not tolerate retroflexion.     FINAL DIAGNOSIS:   A. TERMINAL ILEUM, BIOPSY:   - Ileal mucosa with no significant histologic abnormality   - No evidence of inflammation     B. RIGHT COLON, BIOPSY:   - Colonic mucosa with no significant histologic abnormality   - No evidence of inflammation     C. LEFT COLON, BIOPSY:   - Colonic mucosa with no significant histologic abnormality   - No evidence of inflammation     D. RECTUM, BIOPSY:   - Rectal mucosa with no significant histologic abnormality   - No evidence of inflammation      EndoFLIP directed at the UES or LES (8cm (EF-325) balloon length or 16cm (EF-322) balloon length):   Date:  8cm balloon  Balloon inflation Balloon pressure CSA (mm^2) DI (mm^2/mmHg) Dmin (mm) Compliance   20 (ladmark ID)        30        40        50           16cm balloon  Balloon inflation Balloon pressure CSA (mm^2) DI (mm^2/mmHg) Dmin (mm) Compliance   30 (ladmark ID)        40        50        60        70           High Resolution Manometry:    PH/Impedance:     Bravo:    CT:    Esophagram:    FL VSS:     GES:    U/S:     7/15/2019  FINDINGS:   The liver has a normal echotexture with no focal abnormality.  Visualized portions of the pancreas are normal. The spleen is  normal  in size at 9.8 cm. The gallbladder is normal. Sonographic 's  sign is negative. There are no gallstones. Common duct measures 1 mm.  The aorta and IVC are normal. The right kidney measures 11.2 cm. The  left kidney measures 10.9 cm. There is no hydronephrosis.                                                               IMPRESSION:      Normal abdominal ultrasound.       XRAY:    Other:       Prior medical records were reviewed including, but not limited to, notes from referring providers, lab work, radiographic tests, and other diagnostic tests. Pertinent results were summarized above.     History     Past Medical History:   Diagnosis Date    Anxiety     Depressive disorder     Intractable vomiting with nausea, unspecified vomiting type 11/13/2016    Kidney stone on right side 11/13/2016    DIEGO (obstructive sleep apnea)- severe (AHI 35) 3/24/2022       Past Surgical History:   Procedure Laterality Date    COLONOSCOPY      COLONOSCOPY N/A 1/18/2021    Procedure: COLONOSCOPY, WITH  BIOPSY;  Surgeon: Spencer Barraza MD;  Location: [facility] OR       Social History     Socioeconomic History    Marital status: Single     Spouse name: Not on file    Number of children: 0    Years of education: Not on file    Highest education level: Not on file   Occupational History    Occupation: grad student   Tobacco Use    Smoking status: Never    Smokeless tobacco: Never   Vaping Use    Vaping status: Never Used   Substance and Sexual Activity    Alcohol use: Not Currently    Drug use: No    Sexual activity: Yes     Partners: Male   Other Topics Concern    Not on file   Social History Narrative    Not on file     Social Drivers of Health     Financial Resource Strain: Not on file   Food Insecurity: No Food Insecurity (11/8/2024)    Received from [facility] Vital Sign     Worried About Running Out of Food in the Last Year: Never true     Ran Out of Food in the Last Year: Never true    Transportation Needs: No Transportation Needs (11/8/2024)    Received from [facility]     PRAPARE - Transportation     Lack of Transportation (Medical): No     Lack of Transportation (Non-Medical): No   Physical Activity: Not on file   Stress: Not on file   Social Connections: Not on file   Interpersonal Safety: Not At Risk (11/8/2024)    Received from [facility]     Humiliation, Afraid, Rape, and Kick questionnaire     Fear of Current or Ex-Partner: No     Emotionally Abused: No     Physically Abused: No     Sexually Abused: No   Housing Stability: Low Risk  (11/8/2024)    Received from [facility]     Housing Stability Vital Sign     Unable to Pay for Housing in the Last Year: No     Number of Times Moved in the Last Year: 0     Homeless in the Last Year: No       Family History   Problem Relation Age of Onset    Diabetes Mother     Obesity Mother     Hyperlipidemia Mother     Uterine Cancer Maternal Grandmother     Anesthesia Reaction No family hx of     Thrombosis No family hx of      Family history reviewed and edited as appropriate    Medications and Allergies:     Outpatient Encounter Medications as of 5/27/2025   Medication Sig Dispense Refill    acetaminophen (TYLENOL) 500 MG tablet Take 2 tablets (1,000 mg) by mouth 4 times daily (Patient not taking: Reported on 5/12/2025) 30 tablet 1    albuterol (PROAIR HFA/PROVENTIL HFA/VENTOLIN HFA) 108 (90 Base) MCG/ACT inhaler Inhale 1 puff into the lungs as needed (Patient not taking: Reported on 5/12/2025)      amphetamine-dextroamphetamine (ADDERALL XR) 10 MG 24 hr capsule Take 10 mg by mouth 3 times daily      cetirizine (ZYRTEC) 10 MG tablet Take 1 tablet by mouth daily.      diltiazem 2% in PLO gel To anal opening three times daily.  Use a pea-sized amount.  Store at room temperature. (Patient not taking: Reported on 5/12/2025) 60 g 0    escitalopram (LEXAPRO) 20 MG tablet Take 1 tablet by mouth every morning      famotidine  (PEPCID) 20 MG tablet Take 1 tablet (20 mg) by mouth 2 times daily 180 tablet 3    LORazepam (ATIVAN) 0.5 MG tablet Take 0.5 mg by mouth every 6 hours as needed      menthol-zinc oxide (CALMOSEPTINE) 0.44-20.6 % OINT ointment Apply topically 4 times daily as needed for skin protection. Apply thick layer to perianal skin 3-4 times daily for skin irritation. (Patient not taking: Reported on 5/12/2025) 113 g 3    menthol-zinc oxide (CALMOSEPTINE) 0.44-20.6 % OINT ointment Apply topically 4 times daily as needed for skin protection Apply thick layer to perianal skin 3-4 times daily for skin irritation. (Patient not taking: Reported on 5/12/2025) 113 g 3    methylcellulose (CITRUCEL) powder Take 1 tablespoon 2 times per day.  Increase to 3 times per day as needed to achieve daily bowel movements. 850 g 3    omeprazole (PRILOSEC) 40 MG DR capsule Take 1 capsule (40 mg) by mouth daily. 90 capsule 2    ondansetron (ZOFRAN ODT) 4 MG ODT tab Take 1 tablet (4 mg) by mouth every 8 hours as needed for nausea 20 tablet 0    polyethylene glycol (MIRALAX) 17 GM/Dose powder Take 17 g (1 Capful) by mouth daily. 500 g 1    ZOLMitriptan (ZOMIG) 2.5 MG tablet Take 1 tablet (2.5 mg) by mouth at onset of headache for migraine May repeat in 2 hours. Max 4 tablets/24 hours. 18 tablet 9     No facility-administered encounter medications on file as of 5/27/2025.        Allergies   Allergen Reactions    Codeine Anaphylaxis    Hydromorphone Nausea and Vomiting     Zofran DOES NOT HELP, only compounds problem, PER PT.     Oxycodone Other (See Comments)     Vomitting          Review of systems:  A full 10 point review of systems was obtained and was negative except for the pertinent positives and negatives stated within the HPI.    Objective Findings:   Physical Exam:    Constitutional: There were no vitals taken for this visit.  General: Alert, cooperative, no distress, well-appearing  Head: Atraumatic, normocephalic, no obvious abnormalities    Eyes: Sclera anicteric, no obvious conjunctival hemorrhage   Nose: Nares normal, no obvious malformation, no obvious rhinorrhea   Respiratory: Resting comfortably, no apparent distress, no cough.   Skin: No jaundice, no obvious rash  Neurologic: AAOx3, no obvious neurologic abnormality  Psychiatric: Normal Affect, appropriate mood  Extremities: No obvious edema, no obvious malformation     Labs, Radiology, Pathology     Lab Results   Component Value Date    WBC 6.8 04/26/2022    WBC 7.8 11/27/2018    WBC 7.7 09/02/2017    HGB 12.2 (L) 04/24/2024    HGB 12.9 (L) 04/26/2022    HGB 13.6 11/27/2018     04/26/2022     11/27/2018     09/02/2017    CHOL 181 05/12/2025    TRIG 214 (H) 05/12/2025    TRIG 113 06/29/2022    HDL 45 05/12/2025    ALT 72 (H) 05/12/2025    ALT 46 04/24/2024    ALT 78 (H) 04/26/2022    AST 50 (H) 05/12/2025    AST 24 04/24/2024    AST 43 04/26/2022     05/12/2025     06/20/2024     04/26/2022    BUN 15.4 05/12/2025    BUN 15.9 06/20/2024    BUN 17 04/26/2022    CO2 24 05/12/2025    CO2 24 06/20/2024    CO2 25 04/26/2022    TSH 1.71 04/24/2024    TSH 0.79 12/11/2016    INR 1.04 12/11/2016        Liver Function Studies -   Recent Labs   Lab Test 04/26/22  1111   PROTTOTAL 7.7   ALBUMIN 3.6   BILITOTAL 0.3   ALKPHOS 158*   AST 43   ALT 78*          Patient Active Problem List    Diagnosis Date Noted    Esophageal reflux 03/24/2022     Priority: Medium    Depressive disorder 03/24/2022     Priority: Medium    Anxiety 03/24/2022     Priority: Medium    DIEGO (obstructive sleep apnea)- severe (AHI 35) 03/24/2022     Priority: Medium     3/23/2022 [place] Diagnostic Sleep Study (-) - AHI 35.3, RDI 38.3, Supine AHI 69.1, Lateral AHI 5, REM AHI 1.7, Low O2 78.0%, Time Spent <=88% 20.0 minutes / Time Spent <=89% 28.2 minutes.      Class 1 obesity due to excess calories with serious comorbidity and body mass index (BMI) of 32.0 to 32.9 in adult 03/24/2022     Priority:  Low      Assessment and Plan   Assessment/Plan:    Kirby Patel is a 39 year old male with significant past medical history pertinent for anxiety, depression, DIEGO, migraine headaches who is presenting as a follow up patient however as a new patient to myself with a chief complaint of irregular bowel patterns and anemia.    #BRBPR   Colonoscopy in 2021 completed for intermittent rectal bleeding that was unremarkable, suspected to have outlet bleeding.      Colorectal surgery team 7/12/2023 with findings notable for internal hemorrhoids.  Options of hemorrhoid banding versus conservative management were discussed.  Despite increase indaily fiber supplementation and water intake Kirby continued to be symptomatic.    6/21/2024 underwent hemorrhoidectomy.  Patient was seen postoperatively 7/12/2024 by the colorectal surgery team and recommend initiation of daily fiber supplementation as well as a follow-up as needed    2/17/2025 follow-up with colorectal surgery with examination pertinent for small hemorrhoids and evidence of bleeding, anterior midline fissure and hypertonic anal sphincter.    Today Kirby reports that he last experienced rectal bleeding approximately 2-3 weeks after his assessment of colorectal surgery.  He is currently taking Benefiber Gummies 3 chewables once daily and had used the diltiazem topical cream 2 times daily for 2 to 3 weeks time.  He does present with symptoms of tenesmus/sensation of incomplete evacuation that may be consistent with pelvic floor/outlet dysfunction for which referral will be placed to the pelvic floor center which had been previously recommended by colorectal surgery.    With history of chronic intermittent rectal bleeding, recent history of fissure and underlying SOREN will obtain CRP/fecal calprotectin to evaluate for small/large bowel inflammatory changes.  Pending results would then consider both upper and lower endoscopic evaluation with emphasis on endoscopic  evaluation for IBD.    - Continued use of your squatty potty  - Emphasis placed on the importance of hydration especially in the setting of daily fiber supplementation  - Please increase your daily fiber supplementation, male should be receiving approximately 30 to 35 g of fiber daily between her diet and daily supplementation  - Complete CRP and fecal calprotectin at your next best convenience, pending results would consider both upper and lower endoscopic evaluation   - Consultation to the pelvic floor center for possible outlet dysfunction   - Goal of 3-4 stools per week that are soft and formed     #Possible Lactose Intolerance    - As needed use of Lactaid, future considerations would be to attempt a 2 to 4-week dairy free trial     #GERD  1/2020 EGD with findings notable for reactive gastropathy and peptic duodenitis, without erosive esophagitis.  Biopsies negative for H. pylori, intestinal metaplasia or dysplasia. Duodenal biopsies with foveolar metaplasia consistent with peptic duodenitis.      Overall symptoms had improved with current acid suppressive regimen as well as a focus on healthy dietary habits and intentional weight loss.  Now with increased symptoms associated with an approximate 30 pound weight gain.    Discussed the new updates in the Dean criteria. As Kirby has had difficulties with attempted de-escalation of acid suppressive therapy would then consider objective pH monitoring in the future.  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    - Continue Omeprazole to 40 mg once daily, best taken on an empty stomach at least 30 - 60 minutes prior to the first meal of the day   - Continue Famotidine 20 mg twice daily once in the morning and once in the evening.   - Okay to use TUMs/Andressa Chamois as needed  - Reflux friendly lifestyle modifications as directed within AVS    #Elevated ALP -->  Fractionated as Predominately Bone Origin  #Elevated ALT   Chronic mild elevation in ALT <2x ULN (8+ years) with new mild elevation in alkaline phosphatase <2x ULN. Fractionated ALP was of bone origin. CLD panel negative. Ultrasound of the abdomen 2019 with normal echotexture of the liver without focal abnormality.  Both of which temporarily resolved in the setting of intentional weight loss now with appreciated rise once again associated with an approximate 30 pound weight loss.  Again, concerning for possible MASLD.    - Consultation to hepatology   - Follow-up with your primary care provider form ALP of bone origin.  Emphasized the importance of bone health including high impact physical activity and weight lifting as well as the appropriate supplementation with calcium/vitamin D especially in light of possible lactose intolerance.    #Chronic Normocytic Anemia   First noted 2016. Celiac serologies including TTG IgA/IgG and total IgA within normal limits. EGD 1/2020 with Dr. Lola Ramsey without source to explain patient's symptoms.  Biopsies negative for H. pylori/celiac disease.  Colonoscopy Dr. Spencer Barraza 1/18/2021 indication for intermittent rectal bleeding/irregular bowel patterns completed to the TI with terminal ileal biopsies and random colonic biopsies negative for infiltrative disorders.  No gross manifestations of IBD.    Now with hemoglobin baseline ~11 and iron deficiency. Previous attempts at daily iron supplementation led to significant constipation although now tolerating.  Source is likely secondary diet with the consumption of minimal iron rich foods. With history of chronic intermittent rectal bleeding, recent history of fissure and underlying SOREN will obtain CRP/fecal calprotectin to evaluate for small/large bowel inflammatory changes.  Pending results would then consider both upper and lower endoscopic evaluation with emphasis on endoscopic evaluation for IBD.    - Continued iron  supplementation per your primary care provider  - Forward most recent iron panel to your providers via [employer]  - Please attempt to increase your intake of iron rich meats and search for meat alternatives that high in iron examples would be leafy green vegetables such as spinach, kale, collards, chard as well as  broccoli, beans and lentils    #History of Appendicitis   Hospital admission 11/7/2024 --> 11/8/2024 s/p appendectomy, laparoscopic with Dr. Martinez 11/8/2024     Follow up plan:   Return to clinic 3-4 months and as needed.    The risks and benefits of my recommendations, as well as other treatment options were discussed with the patient and any available family today. All questions were answered.     Follow up: As planned above. Today, I personally spent 30 minutes in direct face to face time with the patient, of which greater than 50% of the time was spent in patient education and counseling as described above. Approximately 21 minutes were spent on indirect care associated with the patient's consultation including but not limited to review of: patient medical records to date, clinic visits, hospital records, lab results, imaging studies, procedural documentation, and coordinating care with other providers. The findings from this review are summarized in the above note. All of the above accounted for a cumulative time of 51 minutes and was performed on the date of service.     The patient verbalized understanding of the plan and was appreciative for the time spent and information provided during the office visit.           Sol Vo PA-C  Division of Gastroenterology, Hepatology, and Nutrition  [facility]       Documentation assisted by voice recognition and documentation system.

## 2025-05-27 NOTE — NURSING NOTE
Current patient location: [address] [place] [id number]    Is the patient currently in the state of [place]? YES    Visit mode: VIDEO    If the visit is dropped, the patient can be reconnected by:VIDEO VISIT: Send to e-mail at: [email or web address]    Will anyone else be joining the visit? NO  (If patient encounters technical issues they should call [phone]    Are changes needed to the allergy or medication list? No    Are refills needed on medications prescribed by this physician? YES    Rooming Documentation:  Questionnaire(s) completed    Reason for visit: RECHECK    Gerard SINGH

## 2025-05-27 NOTE — LETTER
5/27/2025      Kirby Patel  [address]  [facility] [address]      Dear Colleague,    Thank you for referring your patient, Kirby Patel, to the [facility] [place]. Please see a copy of my visit note below.      Virtual Visit Details    Type of service:  Video Visit     Originating Location (pt. Location): Home    Distant Location (provider location):  Off-site  Platform used for Video Visit: [facility]    Gastroenterology Visit for: Kirby Patel 1985   MRN: [id number]     Reason for Visit:  chief complaint    Referred by: Self  / No address on file  Patient Care Team:  [facility], [facility] as PCP - General (Clinic)  Spencer Barraza MD as MD (Gastroenterology)  Branden Melendrez MD as Referring Physician (Pediatrics)  Roderick Hargrove MD as MD (Neurology)  Rico Burciaga PA-C as Physician Assistant (Physician Assistant)  Patel Smith MD as MD (Cardiology)  Gricel Childers APRN CNP as Nurse Practitioner (Neurology)  Ivana Muñoz PA-C (Inactive) as Physician Assistant (Gastroenterology)  Caty Galrand RD as Registered Dietitian (Dietitian, Registered)  Bloch, Lauren Turner, [facility] as Pharmacist (Pharmacist)  Bindu Matias PA-C as Physician Assistant (Gastroenterology)  Nisha Hartmann APRN CNP as Nurse Practitioner (Colon & Rectal)  Sol Vo PA-C as Assigned Gastroenterology Provider  Ronen Glaser MD as MD (Colon & Rectal)  Gricel Childers APRN CNP as Assigned Neuroscience Provider  Ronen Glaser MD as Assigned Surgical Provider  Emory Root MD as MD (Cardiovascular Disease)  Emory Root MD as Assigned Heart and Vascular Provider    History of Present Illness:   Kirby Patel is a 39 year old male with significant past medical history pertinent for anxiety, depression, DIEGO, migraine headaches who is presenting as  "a follow up patient however as a new patient to myself with a chief complaint of irregular bowel patterns and anemia.    -----------------------------------------------------------------------------------------------------------------------------------------------------------------------------------------------------------------------------------  Interval History May 27, 2025:    Reflux - Continues to take omeprazole 40 mg once daily and famotidine 20 mg twice daily.  Denies dysphagia/odynophagia.    Iron - Taking oral supplementation every other day which has resulted in increased nausea and increased reflux. This is relieved with TUMs/Andressa Cambridge. Does not consume beef or other red meat products. Consumes pork and chicken. Now consuming cereal that is fortified with iron.     BRBPR - Returned to [facility] secondary to intermittent rectal bleeding and appreciation of external tissue abnormality. Last experienced BRBPR 1-2 weeks after being seen by [facility] 2/17/2025.     Bowel Patterns - Takes Miralax 17g as needed. Continues to take Benefiber gummies 3, 1x daily. With increasing this to twice daily dosing develops larger stool quantity and increased abdominal discomfort. Described as \"a build up of poop.\" With appropriate hydration does not need to use stool softener to experience regular bowel patterns. Now is stooling ~3x per day on average consistent with Ector Stool Scale Type 4. Decreased physical activity over all secondary to work for his dissertation. Denies pain/discomfort with defecation. Denies difficulties with initiation of a bowel movement. Explains sensation of needing to empty more which results in straining. Further described as sensation of incomplete evacuation.     Used Dilt 2% cream twice daily for ~3 weeks time. Will complete Sitz baths 2x per month. When showering will apply hot water for prolonged periods of time. Did not complete PT for pelvic floor dysfunction.     Weight - 205 lbs. Did weigh " "approximately 177.     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     -----------------------------------------------------------------------------------------------------------------------------------------------------------------------------------------------------------------------------------  Interval History October 22, 2024:    Had significant rectal pain after the procedure. Noting that \"it was so painful that I had suicidal thoughts.\" Had an emergency call with provider at [place]. Explains that his psych nurse was very understanding. Reports allergy to the pain medications. ~ 5 weeks after the surgical intervention has had gradually began to \"feel better.\"     Rectal Bleeding - Onset ~4 weeks ago had reoccurrence of rectal bleeding occurring daily with defecation and independently of defecation. When noted during defecation it is mixed within the stool and within the water in the toilet bowl. When the rectal bleeding is occurring independently of defecation notes smears of red blood with mucus on the toilet paper. Explaining he has a \"wet fart\" which prompts him to the wipe.     Bowel Patterns - Now has increased water intake, continues taking fiber supplementation and has purchased a a squatty potty. In regards to his defecatory patterns he is stooling 2-4x per day. Stools are consistent with [place] Stool Scale Type 4.     Reflux - Currently well-controlled with omeprazole 40 mg once daily and famotidine 20 mg twice daily.  Denies dysphagia/odynophagia.    Recently bought a walking pad to go under his desk    Last year of PHD at the [employer].     -----------------------------------------------------------------------------------------------------------------------------------------------------------------------------------------------------    Interval History April 16, 2024:    Reflux - Omeprazole 40 mg once daily and Famotidine 20 mg twice daily without break through symptoms of heartburn or " regurgitation. Will miss doses of the Famotidine. Noting if he avoid triggers food and consume enough water the reflux would be well controlled without the Famotidine.      Bowel Patterns - Currently he is stool 3-4 times per day consistent with Rogers Stool Scale Type 1-3. 3 weeks of BRBPR which resolved this past Saturday. Had cessation this Saturday. Now using Preparation H once daily x3 days with symptomatic improvement. Currently taking two teaspoons twice daily of Benefiber. No additional laxative use     Weight - 205 lbs --> 182 lbs. Newer exercise equipment at home     Denies unintentional weight loss, nausea, emesis, dysphagia, odynophagia, heartburn, regurgitation, abdominal pain, diarrhea, constipation (< 3 stools per week) and melena.     -----------------------------------------------------------------------------------------------------------------------------------------------------------------------------------------------------------------------------------    Interval History July 27, 2023:    Current acid suppressive medication includes Omeprazole in the morning on an empty stomach and Famotidine 20 mg twice daily prior to meals. Continues to have indigestion later in the evening/prior to bed is relieved with the use of TUMs. Denies heartburn/regurgitation.      Currently Kirby is having bowel movement 2-3 times daily consistent with Rogers Stool Scale Type 4.  Since his last office visit he was evaluated by the colorectal team and advised to increase daily fiber supplementation/daily water intake.  With increase in fiber has noted less frequent red blood per rectum. With more agressive wiping has noted red blood on the toilet paper.     Denies weight loss, nausea, emesis, dysphagia, odynophagia, heartburn, regurgitation, abdominal pain, diarrhea, constipation (< 3 stools per week), melena and hematochezia.     No family history or GI related malignancy (esophageal, gastric, pancreatic, liver or  colon) or family history of IBD/celiac disease.     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      ---------------------------------------------------------------------------------------------------------------------------------------------------------  4/12/2023 SOURAV Matias PA-C:     37 year old male with PMH of anxiety, depression, DIEGO, migraine headaches, presenting to GI clinic for follow-up.     Kirby has previously established in our clinic with Dr. Ofelia Milligan and Ivana Muñoz PA-C. Briefly, he was initially evaluated by a gastroenterologist in [place], where he was diagnosed with irritable bowel syndrome and GERD. He was later seen at [facility] between 2015 and 2016, at which time he had an EGD and colonoscopy for blood in stools. Per patient, this was unremarkable. At time of consultations at our clinic, he reported a variable stool pattern with loose stools alternating with constipation. He underwent EGD 1/23/2020 with normal esophagus, stomach with erythema and a few erosions in the gastric antrum, with biopsies suggestive of reactive gastropathy, without evidence of H pylori. Duodenal biopsies consistent with peptic duodenitis. Patient underwent colonoscopy 1/18/2021 without source of symptoms. He had previously been given a course of Rifaximin for symptoms, with initial course offering symptom improvement.     At most recent follow-up January 2022, he noted bloating and constipation, followed by diarrhea. He was recommended to start magnesium 400 mg daily. In regard to GERD, he was recommended to continue omeprazole.     Today, Kirby notes he has lost weight; he notes that around the Fall of  Last year, he was formaly diagnosed and placed on ADHD medications and started to lose weight following this. His partner started a weight loss journey around that time, and this has changed Kirby's eating habits as well, noting  decreased intake of take-out (previously daily), and decreased volume of intake. He estimates with these changes, he has lost 25 pounds intentionally. He notes that this has helped the heartburn and reflux significantly. He uses famotidine daily to as needed, which helps. Remains on Prilosec.     However, he continues to have periods of time where he notes he has BRBPR with bowel movements. He is trying to strain less and spend less time on the toilet. He notes there was a time where he was taking fiber, and instead of helping with the stools, he notes he had increased bloating and constipated. Currently, he notes he has ongoing variable bowel habits. He notes the bloating has been improved by cutting out soda. Coffee can augment bloating as well. He uses Gas-X if needed. He is having bowel movements ranging from once daily, which then is followed by what he describes as a release, with 5-6 clustered bowel movements that day. He estimates that this has happened 3-4 times in the last few months. Stools can often be hard and dry, associated with straining and a sense of incomplete evacuation. He is not currently using magnesium.        Esophageal Questionnaire(s)    BEDQ Questionnaire      4/16/2024     7:50 AM 10/22/2024     3:08 PM   BEDQ Questionnaire: How Often Have You Had the Following?   Trouble eating solid food (meat, bread, vegetables) 0 0   Trouble eating soft foods (yogurt, jello, pudding) 0 0   Trouble swallowing liquids 0 0   Pain while swallowing 0 0   Coughing or choking while swallowing foods or liquids 0 0   Total Score: 0 0         4/16/2024     7:50 AM 10/22/2024     3:08 PM   BEDQ Questionnaire: Discomfort/Pain Ratings   Eating solid food (meat, bread, vegetables) 0 0   Eating soft foods (yogurt, jello, pudding) 0 0   Drinking liquid 0 0   Total Score: 0 0       Eckardt Questionnaire      4/16/2024     7:51 AM 10/22/2024     3:09 PM   Eckardt Questionnaire   Dysphagia 0 0   Regurgitation 0 1    Retrosternal Pain 0 1   Weight Loss (kg) 2 0   Total Score:  2 2       Promis 10 Questionnaire      4/16/2024     7:52 AM 10/22/2024     3:10 PM   PROMIS 10 FLOWSHEET DATA   In general, would you say your health is: 2 2   In general, would you say your quality of life is: 2 2   In general, how would you rate your physical health? 1 2   In general, how would you rate your mental health, including your mood and your ability to think? 3 3   In general, how would you rate your satisfaction with your social activities and relationships? 3 3   In general, please rate how well you carry out your usual social activities and roles. (This includes activities at home, at work and in your community, and responsibilities as a parent, child, spouse, employee, friend, etc.) 3 3   To what extent are you able to carry out your everyday physical activities such as walking, climbing stairs, carrying groceries, or moving a chair? 3 3   In the past 7 days, how often have you been bothered by emotional problems such as feeling anxious, depressed, or irritable? 3 3   In the past 7 days, how would you rate your fatigue on average? 5 3   In the past 7 days, how would you rate your pain on average, where 0 means no pain, and 10 means worst imaginable pain? 3 2   Mental health question re-calculation - no clinical value 3 3   Physical health question re-calculation - no clinical value 1 3   Pain question re-calculation - no clinical value 4 4   Global Mental Health Score 11 11   Global Physical Health Score 9 12   PROMIS TOTAL - SUBSCORES 20 23           STUDIES & PROCEDURES:    EGD:     1/2020 [facility]     FINAL DIAGNOSIS:   A. DUODENAL BIOPSY:   - Duodenal mucosa with foveolar metaplasia consistent with peptic   duodenitis     B. GASTRIC BIOPSY:   - Mild chronic gastritis   - Features of reactive gastropathy   - No H. pylori like organisms identified on routine staining   - Negative for intestinal metaplasia or dysplasia      Colonoscopy:    1/8/2021  Findings:       The terminal ileum appeared normal. Biopsies were taken with a cold        forceps for histology.        The entire examined colon appeared normal.        Biopsies for histology were taken with a cold forceps from the right        colon, left colon and rectum for evaluation of microscopic colitis.     Impression:          - The examined portion of the ileum was normal. Biopsied.                        - The entire examined colon is normal.                        - Biopsies were taken with a cold forceps from the right                        colon, left colon and rectum for evaluation of                        microscopic colitis.                        - Retroflexion was not performed as the rectal vault was                        small and patient would not tolerate retroflexion.     FINAL DIAGNOSIS:   A. TERMINAL ILEUM, BIOPSY:   - Ileal mucosa with no significant histologic abnormality   - No evidence of inflammation     B. RIGHT COLON, BIOPSY:   - Colonic mucosa with no significant histologic abnormality   - No evidence of inflammation     C. LEFT COLON, BIOPSY:   - Colonic mucosa with no significant histologic abnormality   - No evidence of inflammation     D. RECTUM, BIOPSY:   - Rectal mucosa with no significant histologic abnormality   - No evidence of inflammation      EndoFLIP directed at the UES or LES (8cm (EF-325) balloon length or 16cm (EF-322) balloon length):   Date:  8cm balloon  Balloon inflation Balloon pressure CSA (mm^2) DI (mm^2/mmHg) Dmin (mm) Compliance   20 (ladmark ID)        30        40        50           16cm balloon  Balloon inflation Balloon pressure CSA (mm^2) DI (mm^2/mmHg) Dmin (mm) Compliance   30 (ladmark ID)        40        50        60        70           High Resolution Manometry:    PH/Impedance:     Bravo:    CT:    Esophagram:    FL VSS:     GES:    U/S:     7/15/2019  FINDINGS:   The liver has a normal echotexture with no focal  abnormality.  Visualized portions of the pancreas are normal. The spleen is normal  in size at 9.8 cm. The gallbladder is normal. Sonographic 's  sign is negative. There are no gallstones. Common duct measures 1 mm.  The aorta and IVC are normal. The right kidney measures 11.2 cm. The  left kidney measures 10.9 cm. There is no hydronephrosis.                                                               IMPRESSION:      Normal abdominal ultrasound.       XRAY:    Other:       Prior medical records were reviewed including, but not limited to, notes from referring providers, lab work, radiographic tests, and other diagnostic tests. Pertinent results were summarized above.     History     Past Medical History:   Diagnosis Date     Anxiety      Depressive disorder      Intractable vomiting with nausea, unspecified vomiting type 11/13/2016     Kidney stone on right side 11/13/2016     DIEGO (obstructive sleep apnea)- severe (AHI 35) 3/24/2022       Past Surgical History:   Procedure Laterality Date     COLONOSCOPY       COLONOSCOPY N/A 1/18/2021    Procedure: COLONOSCOPY, WITH  BIOPSY;  Surgeon: Spencer Barraza MD;  Location: [facility]       Social History     Socioeconomic History     Marital status: Single     Spouse name: Not on file     Number of children: 0     Years of education: Not on file     Highest education level: Not on file   Occupational History     Occupation: grad student   Tobacco Use     Smoking status: Never     Smokeless tobacco: Never   Vaping Use     Vaping status: Never Used   Substance and Sexual Activity     Alcohol use: Not Currently     Drug use: No     Sexual activity: Yes     Partners: Male   Other Topics Concern     Not on file   Social History Narrative     Not on file     Social Drivers of Health     Financial Resource Strain: Not on file   Food Insecurity: No Food Insecurity (11/8/2024)    Received from [facility] Vital Sign      Worried About Running  Out of Food in the Last Year: Never true      Ran Out of Food in the Last Year: Never true   Transportation Needs: No Transportation Needs (11/8/2024)    Received from [facility]     PRAPARE - Transportation      Lack of Transportation (Medical): No      Lack of Transportation (Non-Medical): No   Physical Activity: Not on file   Stress: Not on file   Social Connections: Not on file   Interpersonal Safety: Not At Risk (11/8/2024)    Received from [facility]     Humiliation, Afraid, Rape, and Kick questionnaire      Fear of Current or Ex-Partner: No      Emotionally Abused: No      Physically Abused: No      Sexually Abused: No   Housing Stability: Low Risk  (11/8/2024)    Received from [facility]     Housing Stability Vital Sign      Unable to Pay for Housing in the Last Year: No      Number of Times Moved in the Last Year: 0      Homeless in the Last Year: No       Family History   Problem Relation Age of Onset     Diabetes Mother      Obesity Mother      Hyperlipidemia Mother      Uterine Cancer Maternal Grandmother      Anesthesia Reaction No family hx of      Thrombosis No family hx of      Family history reviewed and edited as appropriate    Medications and Allergies:     Outpatient Encounter Medications as of 5/27/2025   Medication Sig Dispense Refill     acetaminophen (TYLENOL) 500 MG tablet Take 2 tablets (1,000 mg) by mouth 4 times daily (Patient not taking: Reported on 5/12/2025) 30 tablet 1     albuterol (PROAIR HFA/PROVENTIL HFA/VENTOLIN HFA) 108 (90 Base) MCG/ACT inhaler Inhale 1 puff into the lungs as needed (Patient not taking: Reported on 5/12/2025)       amphetamine-dextroamphetamine (ADDERALL XR) 10 MG 24 hr capsule Take 10 mg by mouth 3 times daily       cetirizine (ZYRTEC) 10 MG tablet Take 1 tablet by mouth daily.       diltiazem 2% in PLO gel To anal opening three times daily.  Use a pea-sized amount.  Store at room temperature. (Patient not taking: Reported on  5/12/2025) 60 g 0     escitalopram (LEXAPRO) 20 MG tablet Take 1 tablet by mouth every morning       famotidine (PEPCID) 20 MG tablet Take 1 tablet (20 mg) by mouth 2 times daily 180 tablet 3     LORazepam (ATIVAN) 0.5 MG tablet Take 0.5 mg by mouth every 6 hours as needed       menthol-zinc oxide (CALMOSEPTINE) 0.44-20.6 % OINT ointment Apply topically 4 times daily as needed for skin protection. Apply thick layer to perianal skin 3-4 times daily for skin irritation. (Patient not taking: Reported on 5/12/2025) 113 g 3     menthol-zinc oxide (CALMOSEPTINE) 0.44-20.6 % OINT ointment Apply topically 4 times daily as needed for skin protection Apply thick layer to perianal skin 3-4 times daily for skin irritation. (Patient not taking: Reported on 5/12/2025) 113 g 3     methylcellulose (CITRUCEL) powder Take 1 tablespoon 2 times per day.  Increase to 3 times per day as needed to achieve daily bowel movements. 850 g 3     omeprazole (PRILOSEC) 40 MG DR capsule Take 1 capsule (40 mg) by mouth daily. 90 capsule 2     ondansetron (ZOFRAN ODT) 4 MG ODT tab Take 1 tablet (4 mg) by mouth every 8 hours as needed for nausea 20 tablet 0     polyethylene glycol (MIRALAX) 17 GM/Dose powder Take 17 g (1 Capful) by mouth daily. 500 g 1     ZOLMitriptan (ZOMIG) 2.5 MG tablet Take 1 tablet (2.5 mg) by mouth at onset of headache for migraine May repeat in 2 hours. Max 4 tablets/24 hours. 18 tablet 9     No facility-administered encounter medications on file as of 5/27/2025.        Allergies   Allergen Reactions     Codeine Anaphylaxis     Hydromorphone Nausea and Vomiting     Zofran DOES NOT HELP, only compounds problem, PER PT.      Oxycodone Other (See Comments)     Vomitting          Review of systems:  A full 10 point review of systems was obtained and was negative except for the pertinent positives and negatives stated within the HPI.    Objective Findings:   Physical Exam:    Constitutional: There were no vitals taken for this  visit.  General: Alert, cooperative, no distress, well-appearing  Head: Atraumatic, normocephalic, no obvious abnormalities   Eyes: Sclera anicteric, no obvious conjunctival hemorrhage   Nose: Nares normal, no obvious malformation, no obvious rhinorrhea   Respiratory: Resting comfortably, no apparent distress, no cough.   Skin: No jaundice, no obvious rash  Neurologic: AAOx3, no obvious neurologic abnormality  Psychiatric: Normal Affect, appropriate mood  Extremities: No obvious edema, no obvious malformation     Labs, Radiology, Pathology     Lab Results   Component Value Date    WBC 6.8 04/26/2022    WBC 7.8 11/27/2018    WBC 7.7 09/02/2017    HGB 12.2 (L) 04/24/2024    HGB 12.9 (L) 04/26/2022    HGB 13.6 11/27/2018     04/26/2022     11/27/2018     09/02/2017    CHOL 181 05/12/2025    TRIG 214 (H) 05/12/2025    TRIG 113 06/29/2022    HDL 45 05/12/2025    ALT 72 (H) 05/12/2025    ALT 46 04/24/2024    ALT 78 (H) 04/26/2022    AST 50 (H) 05/12/2025    AST 24 04/24/2024    AST 43 04/26/2022     05/12/2025     06/20/2024     04/26/2022    BUN 15.4 05/12/2025    BUN 15.9 06/20/2024    BUN 17 04/26/2022    CO2 24 05/12/2025    CO2 24 06/20/2024    CO2 25 04/26/2022    TSH 1.71 04/24/2024    TSH 0.79 12/11/2016    INR 1.04 12/11/2016        Liver Function Studies -   Recent Labs   Lab Test 04/26/22  1111   PROTTOTAL 7.7   ALBUMIN 3.6   BILITOTAL 0.3   ALKPHOS 158*   AST 43   ALT 78*          Patient Active Problem List    Diagnosis Date Noted     Esophageal reflux 03/24/2022     Priority: Medium     Depressive disorder 03/24/2022     Priority: Medium     Anxiety 03/24/2022     Priority: Medium     DIEGO (obstructive sleep apnea)- severe (AHI 35) 03/24/2022     Priority: Medium     3/23/2022 [facility] Sleep Study (-) - AHI 35.3, RDI 38.3, Supine AHI 69.1, Lateral AHI 5, REM AHI 1.7, Low O2 78.0%, Time Spent <=88% 20.0 minutes / Time Spent <=89% 28.2 minutes.       Class 1  obesity due to excess calories with serious comorbidity and body mass index (BMI) of 32.0 to 32.9 in adult 03/24/2022     Priority: Low      Assessment and Plan   Assessment/Plan:    Kirby Patel is a 39 year old male with significant past medical history pertinent for anxiety, depression, DIEGO, migraine headaches who is presenting as a follow up patient however as a new patient to myself with a chief complaint of irregular bowel patterns and anemia.    #BRBPR   Colonoscopy in 2021 completed for intermittent rectal bleeding that was unremarkable, suspected to have outlet bleeding.      Colorectal surgery team 7/12/2023 with findings notable for internal hemorrhoids.  Options of hemorrhoid banding versus conservative management were discussed.  Despite increase indaily fiber supplementation and water intake Kirby continued to be symptomatic.    6/21/2024 underwent hemorrhoidectomy.  Patient was seen postoperatively 7/12/2024 by the colorectal surgery team and recommend initiation of daily fiber supplementation as well as a follow-up as needed    2/17/2025 follow-up with colorectal surgery with examination pertinent for small hemorrhoids and evidence of bleeding, anterior midline fissure and hypertonic anal sphincter.    Today Kirby reports that he last experienced rectal bleeding approximately 2-3 weeks after his assessment of colorectal surgery.  He is currently taking Benefiber Gummies 3 chewables once daily and had used the diltiazem topical cream 2 times daily for 2 to 3 weeks time.  He does present with symptoms of tenesmus/sensation of incomplete evacuation that may be consistent with pelvic floor/outlet dysfunction for which referral will be placed to the pelvic floor center which had been previously recommended by colorectal surgery.    With history of chronic intermittent rectal bleeding, recent history of fissure and underlying SOREN will obtain CRP/fecal calprotectin to evaluate for small/large bowel  inflammatory changes.  Pending results would then consider both upper and lower endoscopic evaluation with emphasis on endoscopic evaluation for IBD.    - Continued use of your squatty potty  - Emphasis placed on the importance of hydration especially in the setting of daily fiber supplementation  - Please increase your daily fiber supplementation, male should be receiving approximately 30 to 35 g of fiber daily between her diet and daily supplementation  - Complete CRP and fecal calprotectin at your next best convenience, pending results would consider both upper and lower endoscopic evaluation   - Consultation to the pelvic floor center for possible outlet dysfunction   - Goal of 3-4 stools per week that are soft and formed     #Possible Lactose Intolerance    - As needed use of Lactaid, future considerations would be to attempt a 2 to 4-week dairy free trial     #GERD  1/2020 EGD with findings notable for reactive gastropathy and peptic duodenitis, without erosive esophagitis.  Biopsies negative for H. pylori, intestinal metaplasia or dysplasia. Duodenal biopsies with foveolar metaplasia consistent with peptic duodenitis.      Overall symptoms had improved with current acid suppressive regimen as well as a focus on healthy dietary habits and intentional weight loss.  Now with increased symptoms associated with an approximate 30 pound weight gain.    Discussed the new updates in the Dean criteria. As Kirby has had difficulties with attempted de-escalation of acid suppressive therapy would then consider objective pH monitoring in the future.  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    - Continue Omeprazole to 40 mg once daily, best taken on an empty stomach at least 30 - 60 minutes prior to the first meal of the day   - Continue Famotidine 20 mg twice daily once in the morning and once in the evening.   -  Okay to use TUMs/Andressa La Fontaine as needed  - Reflux friendly lifestyle modifications as directed within AVS    #Elevated ALP --> Fractionated as Predominately Bone Origin  #Elevated ALT   Chronic mild elevation in ALT <2x ULN (8+ years) with new mild elevation in alkaline phosphatase <2x ULN. Fractionated ALP was of bone origin. CLD panel negative. Ultrasound of the abdomen 2019 with normal echotexture of the liver without focal abnormality.  Both of which temporarily resolved in the setting of intentional weight loss now with appreciated rise once again associated with an approximate 30 pound weight loss.  Again, concerning for possible MASLD.    - Consultation to hepatology   - Follow-up with your primary care provider form ALP of bone origin.  Emphasized the importance of bone health including high impact physical activity and weight lifting as well as the appropriate supplementation with calcium/vitamin D especially in light of possible lactose intolerance.    #Chronic Normocytic Anemia   First noted 2016. Celiac serologies including TTG IgA/IgG and total IgA within normal limits. EGD 1/2020 with Dr. Lola Ramsey without source to explain patient's symptoms.  Biopsies negative for H. pylori/celiac disease.  Colonoscopy Dr. Spencer Barraza 1/18/2021 indication for intermittent rectal bleeding/irregular bowel patterns completed to the TI with terminal ileal biopsies and random colonic biopsies negative for infiltrative disorders.  No gross manifestations of IBD.    Now with hemoglobin baseline ~11 and iron deficiency. Previous attempts at daily iron supplementation led to significant constipation although now tolerating.  Source is likely secondary diet with the consumption of minimal iron rich foods. With history of chronic intermittent rectal bleeding, recent history of fissure and underlying SOREN will obtain CRP/fecal calprotectin to evaluate for small/large bowel inflammatory changes.  Pending results would then  consider both upper and lower endoscopic evaluation with emphasis on endoscopic evaluation for IBD.    - Continued iron supplementation per your primary care provider  - Forward most recent iron panel to your providers via [employer]  - Please attempt to increase your intake of iron rich meats and search for meat alternatives that high in iron examples would be leafy green vegetables such as spinach, kale, collards, chard as well as  broccoli, beans and lentils    #History of Appendicitis   Hospital admission 11/7/2024 --> 11/8/2024 s/p appendectomy, laparoscopic with Dr. Martinez 11/8/2024     Follow up plan:   Return to clinic 3-4 months and as needed.    The risks and benefits of my recommendations, as well as other treatment options were discussed with the patient and any available family today. All questions were answered.     Follow up: As planned above. Today, I personally spent 30 minutes in direct face to face time with the patient, of which greater than 50% of the time was spent in patient education and counseling as described above. Approximately 21 minutes were spent on indirect care associated with the patient's consultation including but not limited to review of: patient medical records to date, clinic visits, hospital records, lab results, imaging studies, procedural documentation, and coordinating care with other providers. The findings from this review are summarized in the above note. All of the above accounted for a cumulative time of 51 minutes and was performed on the date of service.     The patient verbalized understanding of the plan and was appreciative for the time spent and information provided during the office visit.           Sol Vo PA-C  Division of Gastroenterology, Hepatology, and Nutrition  [facility]       Documentation assisted by voice recognition and documentation system.            Again, thank you for allowing me to participate in the care of your patient.         Sincerely,        Sol Vo PA-C    Electronically signed

## 2025-05-27 NOTE — PATIENT INSTRUCTIONS
It was a pleasure meeting with you today and discussing your healthcare plan. Below is a summary of what we covered:    - Consultation to hepatology   - Follow-up with your primary care provider form ALP of bone origin.  Emphasized the importance of bone health including high impact physical activity and weight lifting as well as the appropriate supplementation with calcium/vitamin D especially in light of possible lactose intolerance.  - Continued iron supplementation per your primary care provider  - Forward most recent iron panel to your providers via [employer]  - Please attempt to increase your intake of iron rich meats and search for meat alternatives that high in iron examples would be leafy green vegetables such as spinach, kale, collards, chard as well as  broccoli, beans and lentils      Pelvic Floor Center:  [address] [place] [address], [place] [address]  [place] [address]  P [phone]  F [phone]    Fiber Recommendations:  Recommend starting supplementation with a powdered soluble fiber. When used on a daily basis, this can help regulate the consistency of your stools. Metamucil (psyllium) and Citrucel are preferred examples as these are gel forming fibers. Alternatives which are non gel forming include Benefiber, Esther Powder and Inulin fiber. For males the goal is to obtain 30-35g of fiber daily and for females 20-25 between supplementation and dietary intake. You can start with 1-2 teaspoons per day, with goal to gradually increase to 1 tablespoon daily. You can increase up to 1 tablespoon three times daily if needed. It is important to stay well-hydrated with use of fiber supplementation and to make sure that the fiber powder is well mixed with water as directed on the label. You may experience some bloating with initiation of fiber, which will improve over the first few weeks. A good trial to evaluate the effect is 3-6 months. Of note, many of the fiber products contain artificial sweeteners,  which can cause bloating, gas, and diarrhea in those who may be sensitive to artificial sweeteners. If this is the case, recommend trying Metamucil Premium Blend (with Stevia), Metamucil 4-in-1 without added Sweeteners, or Bellway (sweetened with Monk fruit extract).      Gastroesophageal Reflux Disease (GERD) Lifestyle Modifications:   If taking acid suppression therapy (PPI ie Pantoprazole, Lansoprazole, Omeprazole, Esomeprazole, Rabeprazole, Dexlansoprazole) it should be taken 30 - 60 minutes prior to meals on an empty stomach to have maximum effect  Avoid triggers for reflux such as coffee, chocolate, carbonated beverages, spicy foods, acidic foods (tomato based/citrus and foods with high fat content   Abstinence from alcohol and cessation of all tobacco products is recommended   Studies have shown that weight loss, exercise and maintaining a healthy BMI significantly reduce GERD symptoms   Remain upright while eating and immediately after meals  Do not eat or drink at least 3 hours prior to laying down supine/laying down for bed   Avoid late night/middle of the night snacking    Consider obtaining a wedge pillow or elevating the head end of the bed while sleeping   Avoid sleeping right side down as this can place the lower part of the esophagus/lower esophageal sphincter in a dependent position that favors reflux   Attempting to eat smaller more frequent meals may improve symptoms         Please call my nurse Melecio ([phone]) or Brandie ([phone] ) with any questions or concerns.        See below for any additional questions and scheduling guidelines.    Sign up for [employer]: [employer] patient portal serves as a secure platform for accessing your medical records from the [facility]. Additionally, [employer] facilitates easy, timely, and secure messaging with your care team. If you have not signed up, you may do so by using the provided code or calling [phone].    Coordinating your care after  your visit:  There are multiple options for scheduling your follow-up care based on your provider's recommendation.    How do I schedule a follow-up clinic appointment:   After your appointment, you may receive scheduling assistance with the Clinic Coordinators by having a seat in the waiting room and a Clinic Coordinator will call you up to schedule.  Virtual visits or after you leave the clinic:  Your provider has placed a follow-up order in the [facility] portal for scheduling your return appointment. A member of the scheduling team will contact you to schedule.  [facility] Scheduling: Timely scheduling through [employer] is advised to ensure appointment availability.   Call to schedule: You may schedule your follow-up appointment(s) by calling [phone], option 1.    How do I schedule my endoscopy or colonoscopy procedure:  If a procedure, such as a colonoscopy or upper endoscopy was ordered by your provider, the scheduling team will contact you to schedule this procedure. Or you may choose to call to schedule at   [phone], option 2.  Please allow 20-30 minutes when scheduling a procedure.    How do I get my blood work done? To get your blood work done, you need to schedule a lab appointment at an [facility]. There are multiple ways to schedule:   At the clinic: The Clinic Coordinator you meet after your visit can help you schedule a lab appointment.   [facility] scheduling: [facility] offers online lab scheduling at all [facility] laboratory locations.   Call to schedule: You can call [phone] to schedule your lab appointment.    How do I schedule my imaging study: To schedule imaging studies, such as CT scans, ultrasounds, MRIs, or X-rays, contact Imaging Services at [phone].    How do I schedule a referral to another doctor: If your provider recommended a referral to another specialist(s), the referral order was placed by your provider. You will receive a phone call to schedule this  referral, or you may choose to call the number attached to the referral to self-schedule.    For Post-Visit Question(s):  For any inquiries following today's visit:  Please utilize [email or web address] messaging and allow 48 hours for reply or contact the Call Center during normal business hours at [phone], option 3.  For Emergent After-hours questions, contact the On-Call GI Fellow through the [facility] at [phone].  In addition, you may contact your Nurse directly using the provided contact information.    Test Results:  Test results will be accessible via [email or web address] in compliance with the 21st Century Cures Act. This means that your results will be available to you at the same time as your provider. Often you may see your results before your provider does. Results are reviewed by staff within two weeks with communication follow-up. Results may be released in the patient portal prior to your care team review.    Prescription Refill(s):  Medication prescribed by your provider will be addressed during your visit. For future refills, please coordinate with your pharmacy. If you have not had a recent clinic visit or routine labs, for your safety, your provider may not be able to refill your prescription.     Clinic Fax Number: [phone]        Sincerely,    Sol Vo PA-C  Division of Gastroenterology, Hepatology, and Nutrition  [facility]

## 2025-05-28 ENCOUNTER — PATIENT OUTREACH (OUTPATIENT)
Dept: CARE COORDINATION | Facility: CLINIC | Age: 40
End: 2025-05-28
Payer: COMMERCIAL

## 2025-05-29 ENCOUNTER — DOCUMENTATION ONLY (OUTPATIENT)
Dept: GASTROENTEROLOGY | Facility: CLINIC | Age: 40
End: 2025-05-29
Payer: COMMERCIAL

## 2025-07-19 ENCOUNTER — HEALTH MAINTENANCE LETTER (OUTPATIENT)
Age: 40
End: 2025-07-19

## 2025-08-08 ENCOUNTER — HOSPITAL ENCOUNTER (OUTPATIENT)
Dept: CT IMAGING | Facility: CLINIC | Age: 40
Discharge: HOME | End: 2025-08-08
Attending: INTERNAL MEDICINE | Admitting: INTERNAL MEDICINE
Payer: COMMERCIAL

## 2025-08-08 VITALS
DIASTOLIC BLOOD PRESSURE: 63 MMHG | OXYGEN SATURATION: 98 % | SYSTOLIC BLOOD PRESSURE: 110 MMHG | RESPIRATION RATE: 16 BRPM | HEART RATE: 80 BPM

## 2025-08-08 DIAGNOSIS — R94.31 ABNORMAL ELECTROCARDIOGRAM: ICD-10-CM

## 2025-08-08 DIAGNOSIS — R07.9 INTERMITTENT CHEST PAIN: ICD-10-CM

## 2025-08-08 PROCEDURE — 250N000013 HC RX MED GY IP 250 OP 250 PS 637: Performed by: INTERNAL MEDICINE

## 2025-08-08 PROCEDURE — 75574 CT ANGIO HRT W/3D IMAGE: CPT

## 2025-08-08 PROCEDURE — 250N000011 HC RX IP 250 OP 636: Performed by: INTERNAL MEDICINE

## 2025-08-08 PROCEDURE — 75574 CT ANGIO HRT W/3D IMAGE: CPT | Mod: 26 | Performed by: INTERNAL MEDICINE

## 2025-08-08 RX ORDER — METOPROLOL TARTRATE 25 MG/1
25-100 TABLET, FILM COATED ORAL
Status: COMPLETED | OUTPATIENT
Start: 2025-08-08 | End: 2025-08-08

## 2025-08-08 RX ORDER — ONDANSETRON 2 MG/ML
4 INJECTION INTRAMUSCULAR; INTRAVENOUS
Status: DISCONTINUED | OUTPATIENT
Start: 2025-08-08 | End: 2025-08-09 | Stop reason: HOSPADM

## 2025-08-08 RX ORDER — DILTIAZEM HYDROCHLORIDE 120 MG/1
120 TABLET, FILM COATED ORAL
Status: DISCONTINUED | OUTPATIENT
Start: 2025-08-08 | End: 2025-08-09 | Stop reason: HOSPADM

## 2025-08-08 RX ORDER — IOPAMIDOL 755 MG/ML
110 INJECTION, SOLUTION INTRAVASCULAR ONCE
Status: COMPLETED | OUTPATIENT
Start: 2025-08-08 | End: 2025-08-08

## 2025-08-08 RX ORDER — LIDOCAINE 40 MG/G
CREAM TOPICAL
Status: DISCONTINUED | OUTPATIENT
Start: 2025-08-08 | End: 2025-08-09 | Stop reason: HOSPADM

## 2025-08-08 RX ORDER — NITROGLYCERIN 0.4 MG/1
.4-.8 TABLET SUBLINGUAL
Status: DISCONTINUED | OUTPATIENT
Start: 2025-08-08 | End: 2025-08-09 | Stop reason: HOSPADM

## 2025-08-08 RX ORDER — METOPROLOL TARTRATE 1 MG/ML
5-20 INJECTION, SOLUTION INTRAVENOUS
Status: DISCONTINUED | OUTPATIENT
Start: 2025-08-08 | End: 2025-08-09 | Stop reason: HOSPADM

## 2025-08-08 RX ORDER — IVABRADINE 5 MG/1
5-15 TABLET, FILM COATED ORAL
Status: COMPLETED | OUTPATIENT
Start: 2025-08-08 | End: 2025-08-08

## 2025-08-08 RX ORDER — DILTIAZEM HYDROCHLORIDE 5 MG/ML
10-15 INJECTION INTRAVENOUS
Status: DISCONTINUED | OUTPATIENT
Start: 2025-08-08 | End: 2025-08-09 | Stop reason: HOSPADM

## 2025-08-08 RX ADMIN — IOPAMIDOL 120 ML: 755 INJECTION, SOLUTION INTRAVENOUS at 09:21

## 2025-08-08 RX ADMIN — NITROGLYCERIN 0.8 MG: 0.4 TABLET SUBLINGUAL at 09:24

## 2025-08-08 RX ADMIN — IVABRADINE 10 MG: 5 TABLET, FILM COATED ORAL at 08:10

## 2025-08-08 RX ADMIN — METOPROLOL TARTRATE 50 MG: 50 TABLET, FILM COATED ORAL at 08:59

## 2025-08-08 RX ADMIN — METOPROLOL TARTRATE 50 MG: 50 TABLET, FILM COATED ORAL at 08:10

## 2025-08-19 ENCOUNTER — TRANSCRIBE ORDERS (OUTPATIENT)
Dept: OTHER | Age: 40
End: 2025-08-19

## 2025-08-19 ENCOUNTER — MEDICAL CORRESPONDENCE (OUTPATIENT)
Dept: HEALTH INFORMATION MANAGEMENT | Facility: CLINIC | Age: 40
End: 2025-08-19
Payer: COMMERCIAL

## 2025-08-19 DIAGNOSIS — K21.9 GERD (GASTROESOPHAGEAL REFLUX DISEASE): Primary | ICD-10-CM

## 2025-08-19 DIAGNOSIS — K21.9 GASTROESOPHAGEAL REFLUX DISEASE WITHOUT ESOPHAGITIS: Primary | ICD-10-CM

## 2025-08-25 ENCOUNTER — PATIENT OUTREACH (OUTPATIENT)
Dept: CARE COORDINATION | Facility: CLINIC | Age: 40
End: 2025-08-25
Payer: COMMERCIAL

## (undated) DEVICE — GOWN IMPERVIOUS 2XL BLUE

## (undated) DEVICE — ENDO FORCEP BX CAPTURA PRO SPIKE G50696

## (undated) DEVICE — SUCTION MANIFOLD NEPTUNE 2 SYS 1 PORT 702-025-000

## (undated) DEVICE — SPECIMEN CONTAINER 3OZ W/FORMALIN 59901

## (undated) DEVICE — KIT ENDO TURNOVER/PROCEDURE CARRY-ON 101822

## (undated) DEVICE — TUBING SUCTION 12"X1/4" N612

## (undated) DEVICE — SOL WATER IRRIG 1000ML BOTTLE 2F7114

## (undated) RX ORDER — FENTANYL CITRATE 50 UG/ML
INJECTION, SOLUTION INTRAMUSCULAR; INTRAVENOUS
Status: DISPENSED
Start: 2021-01-18

## (undated) RX ORDER — METOPROLOL TARTRATE 50 MG
TABLET ORAL
Status: DISPENSED
Start: 2025-08-08

## (undated) RX ORDER — ONDANSETRON 2 MG/ML
INJECTION INTRAMUSCULAR; INTRAVENOUS
Status: DISPENSED
Start: 2021-01-18

## (undated) RX ORDER — DIPHENHYDRAMINE HYDROCHLORIDE 50 MG/ML
INJECTION INTRAMUSCULAR; INTRAVENOUS
Status: DISPENSED
Start: 2021-01-18

## (undated) RX ORDER — IVABRADINE 5 MG/1
TABLET, FILM COATED ORAL
Status: DISPENSED
Start: 2025-08-08

## (undated) RX ORDER — NITROGLYCERIN 0.4 MG/1
TABLET SUBLINGUAL
Status: DISPENSED
Start: 2025-08-08